# Patient Record
Sex: MALE | Race: WHITE | NOT HISPANIC OR LATINO | URBAN - METROPOLITAN AREA
[De-identification: names, ages, dates, MRNs, and addresses within clinical notes are randomized per-mention and may not be internally consistent; named-entity substitution may affect disease eponyms.]

---

## 2018-03-05 VITALS
RESPIRATION RATE: 20 BRPM | RESPIRATION RATE: 19 BRPM | OXYGEN SATURATION: 95 % | SYSTOLIC BLOOD PRESSURE: 189 MMHG | TEMPERATURE: 97.8 F | HEART RATE: 91 BPM | SYSTOLIC BLOOD PRESSURE: 129 MMHG | RESPIRATION RATE: 17 BRPM | HEART RATE: 80 BPM | HEART RATE: 88 BPM | RESPIRATION RATE: 18 BRPM | OXYGEN SATURATION: 94 % | DIASTOLIC BLOOD PRESSURE: 38 MMHG | SYSTOLIC BLOOD PRESSURE: 137 MMHG | DIASTOLIC BLOOD PRESSURE: 88 MMHG | DIASTOLIC BLOOD PRESSURE: 70 MMHG | SYSTOLIC BLOOD PRESSURE: 139 MMHG | DIASTOLIC BLOOD PRESSURE: 73 MMHG | HEART RATE: 74 BPM | HEIGHT: 68 IN | OXYGEN SATURATION: 99 % | DIASTOLIC BLOOD PRESSURE: 110 MMHG | RESPIRATION RATE: 16 BRPM | HEART RATE: 92 BPM | OXYGEN SATURATION: 93 % | DIASTOLIC BLOOD PRESSURE: 67 MMHG | HEART RATE: 75 BPM | RESPIRATION RATE: 22 BRPM | SYSTOLIC BLOOD PRESSURE: 153 MMHG | HEART RATE: 84 BPM | WEIGHT: 240 LBS | RESPIRATION RATE: 30 BRPM | SYSTOLIC BLOOD PRESSURE: 108 MMHG | DIASTOLIC BLOOD PRESSURE: 97 MMHG | HEART RATE: 72 BPM | SYSTOLIC BLOOD PRESSURE: 76 MMHG | DIASTOLIC BLOOD PRESSURE: 98 MMHG | OXYGEN SATURATION: 90 % | HEART RATE: 100 BPM | OXYGEN SATURATION: 88 % | SYSTOLIC BLOOD PRESSURE: 147 MMHG | SYSTOLIC BLOOD PRESSURE: 117 MMHG | TEMPERATURE: 97.5 F | OXYGEN SATURATION: 62 % | DIASTOLIC BLOOD PRESSURE: 58 MMHG

## 2018-03-06 ENCOUNTER — AMBULATORY SURGICAL CENTER (AMBULATORY)
Dept: URBAN - METROPOLITAN AREA SURGERY 17 | Facility: SURGERY | Age: 53
End: 2018-03-06

## 2018-03-06 ENCOUNTER — OFFICE (AMBULATORY)
Dept: URBAN - METROPOLITAN AREA PATHOLOGY 4 | Facility: PATHOLOGY | Age: 53
End: 2018-03-06

## 2018-03-06 DIAGNOSIS — K29.50 UNSPECIFIED CHRONIC GASTRITIS WITHOUT BLEEDING: ICD-10-CM

## 2018-03-06 DIAGNOSIS — K44.9 DIAPHRAGMATIC HERNIA WITHOUT OBSTRUCTION OR GANGRENE: ICD-10-CM

## 2018-03-06 DIAGNOSIS — R13.10 DYSPHAGIA, UNSPECIFIED: ICD-10-CM

## 2018-03-06 DIAGNOSIS — K22.2 ESOPHAGEAL OBSTRUCTION: ICD-10-CM

## 2018-03-06 DIAGNOSIS — K21.0 GASTRO-ESOPHAGEAL REFLUX DISEASE WITH ESOPHAGITIS: ICD-10-CM

## 2018-03-06 DIAGNOSIS — K21.9 GASTRO-ESOPHAGEAL REFLUX DISEASE WITHOUT ESOPHAGITIS: ICD-10-CM

## 2018-03-06 LAB
GI HISTOLOGY: A. UNSPECIFIED: (no result)
GI HISTOLOGY: B. UNSPECIFIED: (no result)
GI HISTOLOGY: PDF REPORT: (no result)

## 2018-03-06 PROCEDURE — 43239 EGD BIOPSY SINGLE/MULTIPLE: CPT | Mod: 59 | Performed by: INTERNAL MEDICINE

## 2018-03-06 PROCEDURE — 43249 ESOPH EGD DILATION <30 MM: CPT | Performed by: INTERNAL MEDICINE

## 2018-03-06 PROCEDURE — 88305 TISSUE EXAM BY PATHOLOGIST: CPT | Performed by: INTERNAL MEDICINE

## 2018-03-06 RX ORDER — OMEPRAZOLE 40 MG/1
CAPSULE, DELAYED RELEASE ORAL
Qty: 60 | Refills: 6 | Status: ACTIVE

## 2018-03-06 RX ADMIN — PROPOFOL 100 MG: 10 INJECTION, EMULSION INTRAVENOUS at 10:51

## 2018-03-06 RX ADMIN — LIDOCAINE HYDROCHLORIDE 50 MG: 10 INJECTION, SOLUTION EPIDURAL; INFILTRATION; INTRACAUDAL; PERINEURAL at 10:51

## 2018-03-06 RX ADMIN — PROPOFOL 50 MG: 10 INJECTION, EMULSION INTRAVENOUS at 10:57

## 2018-03-06 RX ADMIN — PROPOFOL 50 MG: 10 INJECTION, EMULSION INTRAVENOUS at 10:53

## 2018-03-06 RX ADMIN — PROPOFOL 50 MG: 10 INJECTION, EMULSION INTRAVENOUS at 10:54

## 2018-03-06 RX ADMIN — PROPOFOL 50 MG: 10 INJECTION, EMULSION INTRAVENOUS at 10:55

## 2018-03-06 RX ADMIN — PROPOFOL 50 MG: 10 INJECTION, EMULSION INTRAVENOUS at 11:01

## 2018-03-06 RX ADMIN — PROPOFOL 25 MG: 10 INJECTION, EMULSION INTRAVENOUS at 10:59

## 2018-03-06 NOTE — SERVICEHPINOTES
51 yo WM with GERD. He has had intermittent dysphagia sx. for the past year. Also regurgitation at night. No bleeding.

## 2018-03-26 VITALS
HEART RATE: 87 BPM | OXYGEN SATURATION: 100 % | TEMPERATURE: 98 F | WEIGHT: 240 LBS | SYSTOLIC BLOOD PRESSURE: 135 MMHG | RESPIRATION RATE: 20 BRPM | DIASTOLIC BLOOD PRESSURE: 82 MMHG | TEMPERATURE: 97.8 F | HEART RATE: 77 BPM | HEART RATE: 92 BPM | DIASTOLIC BLOOD PRESSURE: 53 MMHG | SYSTOLIC BLOOD PRESSURE: 119 MMHG | DIASTOLIC BLOOD PRESSURE: 91 MMHG | HEART RATE: 75 BPM | SYSTOLIC BLOOD PRESSURE: 170 MMHG | RESPIRATION RATE: 16 BRPM | DIASTOLIC BLOOD PRESSURE: 96 MMHG | HEART RATE: 82 BPM | OXYGEN SATURATION: 97 % | OXYGEN SATURATION: 98 % | OXYGEN SATURATION: 88 % | DIASTOLIC BLOOD PRESSURE: 76 MMHG | SYSTOLIC BLOOD PRESSURE: 138 MMHG | DIASTOLIC BLOOD PRESSURE: 71 MMHG | OXYGEN SATURATION: 93 % | HEIGHT: 68 IN | OXYGEN SATURATION: 96 % | SYSTOLIC BLOOD PRESSURE: 164 MMHG | RESPIRATION RATE: 18 BRPM | HEART RATE: 98 BPM | RESPIRATION RATE: 23 BRPM | SYSTOLIC BLOOD PRESSURE: 125 MMHG | DIASTOLIC BLOOD PRESSURE: 88 MMHG | HEART RATE: 73 BPM

## 2018-03-27 ENCOUNTER — AMBULATORY SURGICAL CENTER (AMBULATORY)
Dept: URBAN - METROPOLITAN AREA SURGERY 17 | Facility: SURGERY | Age: 53
End: 2018-03-27

## 2018-03-27 ENCOUNTER — OFFICE (AMBULATORY)
Dept: URBAN - METROPOLITAN AREA PATHOLOGY 4 | Facility: PATHOLOGY | Age: 53
End: 2018-03-27

## 2018-03-27 DIAGNOSIS — K44.9 DIAPHRAGMATIC HERNIA WITHOUT OBSTRUCTION OR GANGRENE: ICD-10-CM

## 2018-03-27 DIAGNOSIS — R13.10 DYSPHAGIA, UNSPECIFIED: ICD-10-CM

## 2018-03-27 DIAGNOSIS — K21.0 GASTRO-ESOPHAGEAL REFLUX DISEASE WITH ESOPHAGITIS: ICD-10-CM

## 2018-03-27 DIAGNOSIS — K22.2 ESOPHAGEAL OBSTRUCTION: ICD-10-CM

## 2018-03-27 LAB
GI HISTOLOGY: A. SELECT: (no result)
GI HISTOLOGY: PDF REPORT: (no result)

## 2018-03-27 PROCEDURE — 43239 EGD BIOPSY SINGLE/MULTIPLE: CPT | Mod: 59 | Performed by: INTERNAL MEDICINE

## 2018-03-27 PROCEDURE — 43249 ESOPH EGD DILATION <30 MM: CPT | Performed by: INTERNAL MEDICINE

## 2018-03-27 PROCEDURE — 88305 TISSUE EXAM BY PATHOLOGIST: CPT | Performed by: INTERNAL MEDICINE

## 2018-03-27 RX ADMIN — PROPOFOL 50 MG: 10 INJECTION, EMULSION INTRAVENOUS at 12:08

## 2018-03-27 RX ADMIN — PROPOFOL 200 MG: 10 INJECTION, EMULSION INTRAVENOUS at 12:08

## 2018-03-27 RX ADMIN — PROPOFOL 100 MG: 10 INJECTION, EMULSION INTRAVENOUS at 12:08

## 2018-03-27 RX ADMIN — LIDOCAINE HYDROCHLORIDE 25 MG: 10 INJECTION, SOLUTION EPIDURAL; INFILTRATION; INTRACAUDAL; PERINEURAL at 12:08

## 2018-03-27 RX ADMIN — PROPOFOL 50 MG: 10 INJECTION, EMULSION INTRAVENOUS at 12:12

## 2018-03-27 NOTE — SERVICEHPINOTES
53 yo WM with GERD. He has had intermittent dysphagia sx. for the past year. Also regurgitation at night. No bleeding. He presents for a repeat dilation.

## 2018-09-28 ENCOUNTER — OFFICE (AMBULATORY)
Dept: URBAN - METROPOLITAN AREA CLINIC 75 | Facility: CLINIC | Age: 53
End: 2018-09-28

## 2018-09-28 VITALS
DIASTOLIC BLOOD PRESSURE: 86 MMHG | WEIGHT: 238 LBS | SYSTOLIC BLOOD PRESSURE: 132 MMHG | HEART RATE: 88 BPM | RESPIRATION RATE: 16 BRPM | HEIGHT: 68 IN

## 2018-09-28 DIAGNOSIS — K62.1 RECTAL POLYP: ICD-10-CM

## 2018-09-28 DIAGNOSIS — K21.9 GASTRO-ESOPHAGEAL REFLUX DISEASE WITHOUT ESOPHAGITIS: ICD-10-CM

## 2018-09-28 DIAGNOSIS — Z80.0 FAMILY HISTORY OF MALIGNANT NEOPLASM OF DIGESTIVE ORGANS: ICD-10-CM

## 2018-09-28 DIAGNOSIS — K22.2 ESOPHAGEAL OBSTRUCTION: ICD-10-CM

## 2018-09-28 DIAGNOSIS — D12.2 BENIGN NEOPLASM OF ASCENDING COLON: ICD-10-CM

## 2018-09-28 DIAGNOSIS — R13.10 DYSPHAGIA, UNSPECIFIED: ICD-10-CM

## 2018-09-28 DIAGNOSIS — Z86.010 PERSONAL HISTORY OF COLONIC POLYPS: ICD-10-CM

## 2018-09-28 DIAGNOSIS — K44.9 DIAPHRAGMATIC HERNIA WITHOUT OBSTRUCTION OR GANGRENE: ICD-10-CM

## 2018-09-28 DIAGNOSIS — K21.0 GASTRO-ESOPHAGEAL REFLUX DISEASE WITH ESOPHAGITIS: ICD-10-CM

## 2018-09-28 DIAGNOSIS — Z12.11 ENCOUNTER FOR SCREENING FOR MALIGNANT NEOPLASM OF COLON: ICD-10-CM

## 2018-09-28 DIAGNOSIS — D12.4 BENIGN NEOPLASM OF DESCENDING COLON: ICD-10-CM

## 2018-09-28 PROCEDURE — 99213 OFFICE O/P EST LOW 20 MIN: CPT | Performed by: INTERNAL MEDICINE

## 2019-04-23 ENCOUNTER — TELEPHONE (OUTPATIENT)
Dept: INTERNAL MEDICINE | Facility: CLINIC | Age: 54
End: 2019-04-23

## 2019-05-06 VITALS
SYSTOLIC BLOOD PRESSURE: 97 MMHG | WEIGHT: 242 LBS | DIASTOLIC BLOOD PRESSURE: 60 MMHG | HEART RATE: 70 BPM | HEART RATE: 69 BPM | SYSTOLIC BLOOD PRESSURE: 131 MMHG | SYSTOLIC BLOOD PRESSURE: 95 MMHG | HEART RATE: 79 BPM | SYSTOLIC BLOOD PRESSURE: 96 MMHG | OXYGEN SATURATION: 95 % | HEART RATE: 65 BPM | RESPIRATION RATE: 16 BRPM | RESPIRATION RATE: 13 BRPM | DIASTOLIC BLOOD PRESSURE: 67 MMHG | OXYGEN SATURATION: 100 % | SYSTOLIC BLOOD PRESSURE: 141 MMHG | RESPIRATION RATE: 27 BRPM | OXYGEN SATURATION: 98 % | SYSTOLIC BLOOD PRESSURE: 93 MMHG | DIASTOLIC BLOOD PRESSURE: 95 MMHG | DIASTOLIC BLOOD PRESSURE: 99 MMHG | SYSTOLIC BLOOD PRESSURE: 92 MMHG | HEART RATE: 74 BPM | OXYGEN SATURATION: 96 % | RESPIRATION RATE: 20 BRPM | OXYGEN SATURATION: 97 % | OXYGEN SATURATION: 94 % | SYSTOLIC BLOOD PRESSURE: 103 MMHG | SYSTOLIC BLOOD PRESSURE: 116 MMHG | RESPIRATION RATE: 19 BRPM | SYSTOLIC BLOOD PRESSURE: 114 MMHG | HEART RATE: 67 BPM | DIASTOLIC BLOOD PRESSURE: 59 MMHG | RESPIRATION RATE: 14 BRPM | DIASTOLIC BLOOD PRESSURE: 100 MMHG | SYSTOLIC BLOOD PRESSURE: 112 MMHG | SYSTOLIC BLOOD PRESSURE: 137 MMHG | TEMPERATURE: 97.8 F | SYSTOLIC BLOOD PRESSURE: 147 MMHG | RESPIRATION RATE: 22 BRPM | DIASTOLIC BLOOD PRESSURE: 90 MMHG | DIASTOLIC BLOOD PRESSURE: 53 MMHG | DIASTOLIC BLOOD PRESSURE: 75 MMHG | DIASTOLIC BLOOD PRESSURE: 63 MMHG | HEART RATE: 68 BPM | RESPIRATION RATE: 11 BRPM | DIASTOLIC BLOOD PRESSURE: 50 MMHG | HEART RATE: 76 BPM | HEIGHT: 68 IN | RESPIRATION RATE: 15 BRPM | TEMPERATURE: 98.7 F

## 2019-05-08 ENCOUNTER — AMBULATORY SURGICAL CENTER (AMBULATORY)
Dept: URBAN - METROPOLITAN AREA SURGERY 17 | Facility: SURGERY | Age: 54
End: 2019-05-08
Payer: COMMERCIAL

## 2019-05-08 ENCOUNTER — OFFICE (AMBULATORY)
Dept: URBAN - METROPOLITAN AREA PATHOLOGY 4 | Facility: PATHOLOGY | Age: 54
End: 2019-05-08

## 2019-05-08 DIAGNOSIS — Z86.010 PERSONAL HISTORY OF COLONIC POLYPS: ICD-10-CM

## 2019-05-08 DIAGNOSIS — K63.5 POLYP OF COLON: ICD-10-CM

## 2019-05-08 DIAGNOSIS — D12.0 BENIGN NEOPLASM OF CECUM: ICD-10-CM

## 2019-05-08 DIAGNOSIS — D12.5 BENIGN NEOPLASM OF SIGMOID COLON: ICD-10-CM

## 2019-05-08 DIAGNOSIS — D12.2 BENIGN NEOPLASM OF ASCENDING COLON: ICD-10-CM

## 2019-05-08 DIAGNOSIS — D12.3 BENIGN NEOPLASM OF TRANSVERSE COLON: ICD-10-CM

## 2019-05-08 DIAGNOSIS — Z80.0 FAMILY HISTORY OF MALIGNANT NEOPLASM OF DIGESTIVE ORGANS: ICD-10-CM

## 2019-05-08 DIAGNOSIS — K64.8 OTHER HEMORRHOIDS: ICD-10-CM

## 2019-05-08 LAB
GI HISTOLOGY: A. UNSPECIFIED: (no result)
GI HISTOLOGY: B. UNSPECIFIED: (no result)
GI HISTOLOGY: C. UNSPECIFIED: (no result)
GI HISTOLOGY: D. UNSPECIFIED: (no result)
GI HISTOLOGY: E. UNSPECIFIED: (no result)
GI HISTOLOGY: F. UNSPECIFIED: (no result)
GI HISTOLOGY: PDF REPORT: (no result)

## 2019-05-08 PROCEDURE — 45380 COLONOSCOPY AND BIOPSY: CPT | Mod: 33,59 | Performed by: INTERNAL MEDICINE

## 2019-05-08 PROCEDURE — 88305 TISSUE EXAM BY PATHOLOGIST: CPT | Performed by: INTERNAL MEDICINE

## 2019-05-08 PROCEDURE — 45385 COLONOSCOPY W/LESION REMOVAL: CPT | Mod: 33 | Performed by: INTERNAL MEDICINE

## 2019-05-08 NOTE — SERVICEHPINOTES
53 year-old gentleman who presents for colonoscopy. He has a family history of colon cancer. He also has a personal history of polyps. He is due for a follow up colonoscopy. He has no diarrhea, constipation or bleeding. There is no change in his past medical or past surgical history. He is in no distress, he does not look acutely ill.

## 2019-06-17 ENCOUNTER — LAB (OUTPATIENT)
Dept: INTERNAL MEDICINE | Facility: CLINIC | Age: 54
End: 2019-06-17

## 2019-06-17 DIAGNOSIS — Z00.00 HEALTH CARE MAINTENANCE: ICD-10-CM

## 2019-06-17 DIAGNOSIS — I10 ESSENTIAL HYPERTENSION, BENIGN: Primary | ICD-10-CM

## 2019-06-18 LAB
ALBUMIN SERPL-MCNC: 4.5 G/DL (ref 3.5–5.2)
ALBUMIN/CREAT UR: <2.1 MG/G CREAT (ref 0–30)
ALBUMIN/GLOB SERPL: 1.9 G/DL
ALP SERPL-CCNC: 140 U/L (ref 39–117)
ALT SERPL-CCNC: 28 U/L (ref 1–41)
AST SERPL-CCNC: 24 U/L (ref 1–40)
BILIRUB SERPL-MCNC: 0.5 MG/DL (ref 0.2–1.2)
BUN SERPL-MCNC: 19 MG/DL (ref 6–20)
BUN/CREAT SERPL: 17.1 (ref 7–25)
CALCIUM SERPL-MCNC: 9.8 MG/DL (ref 8.6–10.5)
CHLORIDE SERPL-SCNC: 102 MMOL/L (ref 98–107)
CHOLEST SERPL-MCNC: 183 MG/DL (ref 0–200)
CO2 SERPL-SCNC: 27 MMOL/L (ref 22–29)
CREAT SERPL-MCNC: 1.11 MG/DL (ref 0.76–1.27)
CREAT UR-MCNC: 139.8 MG/DL
GLOBULIN SER CALC-MCNC: 2.4 GM/DL
GLUCOSE SERPL-MCNC: 125 MG/DL (ref 65–99)
HBA1C MFR BLD: 6.3 % (ref 4.8–5.6)
HDLC SERPL-MCNC: 37 MG/DL (ref 40–60)
LDLC SERPL CALC-MCNC: 117 MG/DL (ref 0–100)
MICROALBUMIN UR-MCNC: <3 UG/ML
POTASSIUM SERPL-SCNC: 4.7 MMOL/L (ref 3.5–5.2)
PROT SERPL-MCNC: 6.9 G/DL (ref 6–8.5)
PSA SERPL-MCNC: 0.85 NG/ML (ref 0–4)
SODIUM SERPL-SCNC: 142 MMOL/L (ref 136–145)
TRIGL SERPL-MCNC: 146 MG/DL (ref 0–150)
VLDLC SERPL CALC-MCNC: 29.2 MG/DL

## 2019-06-24 ENCOUNTER — OFFICE VISIT (OUTPATIENT)
Dept: INTERNAL MEDICINE | Facility: CLINIC | Age: 54
End: 2019-06-24

## 2019-06-24 VITALS
HEART RATE: 85 BPM | WEIGHT: 236.8 LBS | SYSTOLIC BLOOD PRESSURE: 118 MMHG | DIASTOLIC BLOOD PRESSURE: 82 MMHG | OXYGEN SATURATION: 98 % | TEMPERATURE: 98.3 F | RESPIRATION RATE: 20 BRPM | BODY MASS INDEX: 35.89 KG/M2 | HEIGHT: 68 IN

## 2019-06-24 DIAGNOSIS — E78.5 HYPERLIPIDEMIA, UNSPECIFIED HYPERLIPIDEMIA TYPE: ICD-10-CM

## 2019-06-24 DIAGNOSIS — I10 ESSENTIAL HYPERTENSION: ICD-10-CM

## 2019-06-24 DIAGNOSIS — Z11.59 NEED FOR HEPATITIS C SCREENING TEST: ICD-10-CM

## 2019-06-24 DIAGNOSIS — E11.9 TYPE 2 DIABETES MELLITUS WITHOUT COMPLICATION, WITHOUT LONG-TERM CURRENT USE OF INSULIN (HCC): ICD-10-CM

## 2019-06-24 DIAGNOSIS — Z00.00 WELLNESS EXAMINATION: Primary | ICD-10-CM

## 2019-06-24 DIAGNOSIS — E03.8 OTHER SPECIFIED HYPOTHYROIDISM: ICD-10-CM

## 2019-06-24 PROCEDURE — 99396 PREV VISIT EST AGE 40-64: CPT | Performed by: INTERNAL MEDICINE

## 2019-06-24 RX ORDER — ATENOLOL 50 MG/1
50 TABLET ORAL DAILY
Refills: 3 | COMMUNITY
Start: 2019-06-06 | End: 2020-03-05

## 2019-06-24 RX ORDER — ATORVASTATIN CALCIUM 20 MG/1
20 TABLET, FILM COATED ORAL DAILY
Qty: 30 TABLET | Refills: 3 | Status: SHIPPED | OUTPATIENT
Start: 2019-06-24 | End: 2019-10-16 | Stop reason: SDUPTHER

## 2019-06-24 RX ORDER — CETIRIZINE HYDROCHLORIDE 10 MG/1
10 TABLET ORAL DAILY
COMMUNITY

## 2019-06-24 RX ORDER — OMEPRAZOLE 40 MG/1
40 CAPSULE, DELAYED RELEASE ORAL 2 TIMES DAILY
Refills: 3 | COMMUNITY
Start: 2019-05-23 | End: 2020-02-19

## 2019-06-24 RX ORDER — FLUTICASONE PROPIONATE 50 MCG
SPRAY, SUSPENSION (ML) NASAL
Refills: 11 | COMMUNITY
Start: 2019-05-23

## 2019-06-24 RX ORDER — LEVOTHYROXINE SODIUM 137 MCG
TABLET ORAL
Refills: 1 | COMMUNITY
Start: 2019-06-06 | End: 2020-03-05

## 2019-06-24 NOTE — PROGRESS NOTES
Subjective        Chief Complaint   Patient presents with   • Follow-up     fup labs    • Hypertension   • Hyperlipidemia   • Hypothyroidism       PHQ-2 Depression Screening  Little interest or pleasure in doing things? 0   Feeling down, depressed, or hopeless? 0   PHQ-2 Total Score 0         Beni Kunz is a 54 y.o. male who presents for    Patient Active Problem List   Diagnosis   • Left knee sprain   • Wellness examination   • Hyperlipidemia   • Hypertension   • Other specified hypothyroidism   • Type 2 diabetes mellitus, without long-term current use of insulin (CMS/[facility])       History of Present Illness     He sees Dr. Long once per year and has had no recent hives. He sees Dr. Platt once per year. He will be seeing him in July with a PSA. He denies chest pain, dyspnea, melena, or hematochezia. He has been checking his BP and it was 180/80 last week. He has not used his elliptical recently. He is not having reflux. He does not use a CPAP at night. His sugars have been 120-156.  No Known Allergies    Current Outpatient Medications on File Prior to Visit   Medication Sig Dispense Refill   • atenolol (TENORMIN) 50 MG tablet Take 50 mg by mouth Daily.  3   • cetirizine (zyrTEC) 10 MG tablet Take 10 mg by mouth Daily.     • fluticasone (FLONASE) 50 MCG/ACT nasal spray SPRAY 1-2 SPRAYS INTO EACH NOSTRIL EVERY DAY  11   • omeprazole (priLOSEC) 40 MG capsule Take 40 mg by mouth 2 (Two) Times a Day.  3   • SYNTHROID 137 MCG tablet TAKE 1 TABLET BY MOUTH DAILY ON AN EMPTY STOMACH FOR THYROID (MD ORNELAS)  1     No current facility-administered medications on file prior to visit.        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[facility])    • ROYCE (obstructive sleep apnea)    • Tubular adenoma of colon 01/2016   • Urticaria        Past Surgical History:    Procedure Laterality Date   • COLONOSCOPY  05/08/2019   • ESOPHAGEAL DILATATION  2018       Family History   Problem Relation Age of Onset   • Cancer Mother         sinus   • Heart disease Mother    • Arthritis Father    • Heart disease Father    • Colon polyps Father    • Stroke Father    • Colon cancer Brother    • Diabetes Maternal Aunt        Social History     Socioeconomic History   • Marital status: Single     Spouse name: Not on file   • Number of children: Not on file   • Years of education: Not on file   • Highest education level: Not on file   Tobacco Use   • Smoking status: Never Smoker   • Smokeless tobacco: Never Used   Substance and Sexual Activity   • Alcohol use: No     Frequency: Never   • Drug use: No   • Sexual activity: Yes     Birth control/protection: Condom           The following portions of the patient's history were reviewed and updated as appropriate: problem list, allergies, current medications, past medical history, past family history, past social history and past surgical history.    Review of Systems   Constitutional: Negative for chills, fever and unexpected weight loss.   HENT: Negative for postnasal drip and sore throat.    Eyes: Negative for blurred vision.   Respiratory: Negative for cough, shortness of breath and wheezing.    Cardiovascular: Negative for chest pain and leg swelling.   Gastrointestinal: Negative for abdominal pain, blood in stool, nausea, vomiting and GERD.   Endocrine: Negative for polyuria.   Genitourinary: Negative for dysuria, frequency and hematuria.   Musculoskeletal: Negative for gait problem.   Skin: Negative for rash.   Allergic/Immunologic: Negative for immunocompromised state.   Neurological: Negative for weakness.   Hematological: Does not bruise/bleed easily.   Psychiatric/Behavioral: Negative for depressed mood. The patient is not nervous/anxious.        Immunization History   Administered Date(s) Administered   • Flu Vaccine Quad PF >18YRS  "12/05/2017, 11/01/2018   • Hepatitis A 01/23/2018, 08/15/2018   • Hepatitis B 01/23/2018, 08/15/2018, 02/15/2019   • Pneumococcal Polysaccharide (PPSV23) 02/15/2019   • Tdap 01/01/2010       Objective   Vitals:    06/24/19 1506   BP: 118/82   Pulse: 85   Resp: 20   Temp: 98.3 °F (36.8 °C)   TempSrc: Oral   SpO2: 98%   Weight: 107 kg (236 lb 12.8 oz)   Height: 172.7 cm (68\")     Physical Exam   Constitutional: He appears well-developed and well-nourished.   HENT:   Head: Normocephalic and atraumatic.   Mouth/Throat: Oropharynx is clear and moist.   Eyes: Conjunctivae and EOM are normal. Pupils are equal, round, and reactive to light.   Neck: Trachea normal. Neck supple. Carotid bruit is not present. No thyromegaly present.   Cardiovascular: Normal rate, regular rhythm and normal heart sounds.   No murmur heard.  Pulmonary/Chest: Effort normal and breath sounds normal.   Abdominal: Soft. He exhibits no distension and no mass. There is no tenderness. There is no rebound.    Beni had a diabetic foot exam performed today.   During the foot exam he had a monofilament test performed.  Vascular Status -  His right foot exhibits no edema. His left foot exhibits no edema.  Skin Integrity  -  His right foot skin is intact.His left foot skin is intact..  Lymphadenopathy:     He has no cervical adenopathy.   Neurological: He is alert.   Skin: Skin is warm.   Psychiatric: He has a normal mood and affect.   Vitals reviewed.      Procedures    Assessment/Plan   Beni was seen today for follow-up, hypertension, hyperlipidemia and hypothyroidism.    Diagnoses and all orders for this visit:    Wellness examination    Hyperlipidemia, unspecified hyperlipidemia type  -     atorvastatin (LIPITOR) 20 MG tablet; Take 1 tablet by mouth Daily.  -     Comprehensive Metabolic Panel; Future  -     Lipid Panel With / Chol / HDL Ratio; Future    Essential hypertension    Other specified hypothyroidism  -     TSH; Future    Type 2 diabetes " mellitus without complication, without long-term current use of insulin (CMS/HCC)    Need for hepatitis C screening test  -     HCV Antibody Rfx To Qnt PCR; Future      Reviewed labs. Start Lipitor for increased LDL. Encouraged exercise 150 minutes per week. Sees Dr. Platt in July. Had cscope last month. BP is good. A1c and BP are at goal.           No Follow-up on file.

## 2019-09-22 ENCOUNTER — RESULTS ENCOUNTER (OUTPATIENT)
Dept: INTERNAL MEDICINE | Facility: CLINIC | Age: 54
End: 2019-09-22

## 2019-09-22 DIAGNOSIS — E03.8 OTHER SPECIFIED HYPOTHYROIDISM: ICD-10-CM

## 2019-09-22 DIAGNOSIS — E78.5 HYPERLIPIDEMIA, UNSPECIFIED HYPERLIPIDEMIA TYPE: ICD-10-CM

## 2019-09-22 DIAGNOSIS — Z11.59 NEED FOR HEPATITIS C SCREENING TEST: ICD-10-CM

## 2019-09-26 ENCOUNTER — OFFICE VISIT (OUTPATIENT)
Dept: INTERNAL MEDICINE | Facility: CLINIC | Age: 54
End: 2019-09-26

## 2019-09-26 VITALS
BODY MASS INDEX: 36.28 KG/M2 | DIASTOLIC BLOOD PRESSURE: 80 MMHG | SYSTOLIC BLOOD PRESSURE: 120 MMHG | HEIGHT: 68 IN | WEIGHT: 239.4 LBS

## 2019-09-26 DIAGNOSIS — E78.5 HYPERLIPIDEMIA, UNSPECIFIED HYPERLIPIDEMIA TYPE: ICD-10-CM

## 2019-09-26 DIAGNOSIS — E03.8 OTHER SPECIFIED HYPOTHYROIDISM: ICD-10-CM

## 2019-09-26 DIAGNOSIS — R76.8 HEPATITIS C ANTIBODY POSITIVE IN BLOOD: ICD-10-CM

## 2019-09-26 DIAGNOSIS — E11.9 TYPE 2 DIABETES MELLITUS WITHOUT COMPLICATION, WITHOUT LONG-TERM CURRENT USE OF INSULIN (HCC): ICD-10-CM

## 2019-09-26 DIAGNOSIS — R74.8 ALKALINE PHOSPHATASE ELEVATION: ICD-10-CM

## 2019-09-26 DIAGNOSIS — I10 ESSENTIAL HYPERTENSION: Primary | ICD-10-CM

## 2019-09-26 PROCEDURE — 90471 IMMUNIZATION ADMIN: CPT | Performed by: INTERNAL MEDICINE

## 2019-09-26 PROCEDURE — 99214 OFFICE O/P EST MOD 30 MIN: CPT | Performed by: INTERNAL MEDICINE

## 2019-09-26 PROCEDURE — 90674 CCIIV4 VAC NO PRSV 0.5 ML IM: CPT | Performed by: INTERNAL MEDICINE

## 2019-09-26 NOTE — PROGRESS NOTES
Subjective        Chief Complaint   Patient presents with   • Follow-up     fup labs    • Hypertension   • Hypothyroidism   • Hyperlipidemia           Beni Kunz is a 54 y.o. male who presents for    Patient Active Problem List   Diagnosis   • Hyperlipidemia   • Hypertension   • Other specified hypothyroidism   • Type 2 diabetes mellitus, without long-term current use of insulin (CMS/HCC)   • Hepatitis C antibody positive in blood   • Alkaline phosphatase elevation       History of Present Illness     He has had no side effects with Lipitor. He has been checking his BP and it was 130/87 with Dr. Platt; he had a normal prostate exam and PSA. He denies bone pain, chest pain, nausea or abdominal pain. He checks his sugars and it was 130 this am. It has been averaging 125 in the am. He denies IVDA, tattoos, or blood transfusions. He has been with the same women for 20 years; he says he is not aware of her having hep C.  No Known Allergies    Current Outpatient Medications on File Prior to Visit   Medication Sig Dispense Refill   • atenolol (TENORMIN) 50 MG tablet Take 50 mg by mouth Daily.  3   • atorvastatin (LIPITOR) 20 MG tablet Take 1 tablet by mouth Daily. 30 tablet 3   • cetirizine (zyrTEC) 10 MG tablet Take 10 mg by mouth Daily.     • fluticasone (FLONASE) 50 MCG/ACT nasal spray SPRAY 1-2 SPRAYS INTO EACH NOSTRIL EVERY DAY  11   • omeprazole (priLOSEC) 40 MG capsule Take 40 mg by mouth 2 (Two) Times a Day.  3   • SYNTHROID 137 MCG tablet TAKE 1 TABLET BY MOUTH DAILY ON AN EMPTY STOMACH FOR THYROID (MD SOLISS)  1     No current facility-administered medications on file prior to visit.        Past Medical History:   Diagnosis Date   • Abnormal liver enzymes    • Allergic rhinitis    • Esophagitis    • Fatty liver    • GERD (gastroesophageal reflux disease)    • History of allergy    • Hyperlipidemia    • Hypertension    • IgA deficiency (CMS/HCC)    • ROYCE (obstructive sleep apnea)    • Tubular adenoma of colon  "01/2016   • Urticaria        Past Surgical History:   Procedure Laterality Date   • COLONOSCOPY  05/08/2019   • ESOPHAGEAL DILATATION  2018       Family History   Problem Relation Age of Onset   • Cancer Mother         sinus   • Heart disease Mother    • Arthritis Father    • Heart disease Father    • Colon polyps Father    • Stroke Father    • Colon cancer Brother    • Diabetes Maternal Aunt        Social History     Socioeconomic History   • Marital status: Single     Spouse name: Not on file   • Number of children: Not on file   • Years of education: Not on file   • Highest education level: Not on file   Tobacco Use   • Smoking status: Never Smoker   • Smokeless tobacco: Never Used   Substance and Sexual Activity   • Alcohol use: No     Frequency: Never   • Drug use: No   • Sexual activity: Yes     Birth control/protection: Condom           The following portions of the patient's history were reviewed and updated as appropriate: problem list, allergies, current medications, past medical history, past family history, past social history and past surgical history.    Review of Systems   Cardiovascular: Negative for chest pain.   Gastrointestinal: Negative for abdominal pain.       Immunization History   Administered Date(s) Administered   • Flu Vaccine Quad PF >18YRS 12/05/2017, 11/01/2018   • Hepatitis A 01/23/2018, 08/15/2018   • Hepatitis B 01/23/2018, 08/15/2018, 02/15/2019   • Pneumococcal Polysaccharide (PPSV23) 02/15/2019   • Tdap 01/01/2010   • flucelvax quad pfs =>4 YRS 09/26/2019       Objective   Vitals:    09/26/19 1439   BP: 120/80   Weight: 109 kg (239 lb 6.4 oz)   Height: 172.7 cm (68\")     Physical Exam   Constitutional: He appears well-developed and well-nourished.   HENT:   Head: Normocephalic and atraumatic.   Cardiovascular: Normal rate, regular rhythm, S1 normal, S2 normal and normal heart sounds.   Pulmonary/Chest: Effort normal and breath sounds normal.   Abdominal: Soft. He exhibits no " distension and no mass.   Neurological: He is alert.   Skin: Skin is warm.   Psychiatric: He has a normal mood and affect.   Vitals reviewed.      Procedures    Assessment/Plan   Beni was seen today for follow-up, hypertension, hypothyroidism and hyperlipidemia.    Diagnoses and all orders for this visit:    Essential hypertension  -     Comprehensive Metabolic Panel; Future  -     TSH; Future    Hyperlipidemia, unspecified hyperlipidemia type    Other specified hypothyroidism    Hepatitis C antibody positive in blood    Alkaline phosphatase elevation    Type 2 diabetes mellitus without complication, without long-term current use of insulin (CMS/[facility])  -     Hemoglobin A1c; Future    Other orders  -     Flucelvax Quad=>4Years ([id number])             Preliminary labs reviewed; we will see if he cleared hep C. I will have him come back next week for CBC, GGT, HIV, hep B and RPR. TSH and LDL are at goal. If he still has a hep C viral load then I will have him see Dr. Mahendra Michelle who has done his scopes.    Return in about 3 months (around 12/26/2019).

## 2019-10-01 ENCOUNTER — TELEPHONE (OUTPATIENT)
Dept: INTERNAL MEDICINE | Facility: CLINIC | Age: 54
End: 2019-10-01

## 2019-10-01 LAB
ALBUMIN SERPL-MCNC: 4.4 G/DL (ref 3.5–5.2)
ALBUMIN/GLOB SERPL: 1.9 G/DL
ALP SERPL-CCNC: 181 U/L (ref 39–117)
ALT SERPL-CCNC: 28 U/L (ref 1–41)
AST SERPL-CCNC: 25 U/L (ref 1–40)
BILIRUB SERPL-MCNC: 0.4 MG/DL (ref 0.2–1.2)
BUN SERPL-MCNC: 15 MG/DL (ref 6–20)
BUN/CREAT SERPL: 14 (ref 7–25)
CALCIUM SERPL-MCNC: 9.3 MG/DL (ref 8.6–10.5)
CHLORIDE SERPL-SCNC: 103 MMOL/L (ref 98–107)
CHOLEST SERPL-MCNC: 134 MG/DL (ref 0–200)
CHOLEST/HDLC SERPL: 3.94 {RATIO}
CO2 SERPL-SCNC: 25.1 MMOL/L (ref 22–29)
CREAT SERPL-MCNC: 1.07 MG/DL (ref 0.76–1.27)
DIAGNOSTIC IMP SPEC-IMP: NORMAL
GLOBULIN SER CALC-MCNC: 2.3 GM/DL
GLUCOSE SERPL-MCNC: 130 MG/DL (ref 65–99)
HCV AB S/CO SERPL IA: 1.6 S/CO RATIO (ref 0–0.9)
HCV RNA SERPL NAA+PROBE-ACNC: NORMAL IU/ML
HDLC SERPL-MCNC: 34 MG/DL (ref 40–60)
LDLC SERPL CALC-MCNC: 80 MG/DL (ref 0–100)
POTASSIUM SERPL-SCNC: 4.9 MMOL/L (ref 3.5–5.2)
PROT SERPL-MCNC: 6.7 G/DL (ref 6–8.5)
REF LAB TEST REF RANGE: NORMAL
SODIUM SERPL-SCNC: 143 MMOL/L (ref 136–145)
TRIGL SERPL-MCNC: 98 MG/DL (ref 0–150)
TSH SERPL DL<=0.005 MIU/L-ACNC: 3.16 UIU/ML (ref 0.27–4.2)
VLDLC SERPL CALC-MCNC: 19.6 MG/DL

## 2019-10-01 NOTE — TELEPHONE ENCOUNTER
Tell him hep C viral load was negative but I do still want to speak to him. Ask him to give us a number I can call him at on Thursday.

## 2019-10-04 ENCOUNTER — LAB (OUTPATIENT)
Dept: INTERNAL MEDICINE | Facility: CLINIC | Age: 54
End: 2019-10-04

## 2019-10-04 DIAGNOSIS — R74.8 ALKALINE PHOSPHATASE ELEVATION: ICD-10-CM

## 2019-10-04 DIAGNOSIS — R76.8 HEPATITIS C ANTIBODY POSITIVE IN BLOOD: Primary | ICD-10-CM

## 2019-10-04 DIAGNOSIS — E78.5 HYPERLIPIDEMIA, UNSPECIFIED HYPERLIPIDEMIA TYPE: ICD-10-CM

## 2019-10-10 LAB
DIAGNOSTIC IMP SPEC-IMP: NORMAL
HBV CORE AB SERPL QL IA: NEGATIVE
HBV SURFACE AB SER QL: NON REACTIVE
HBV SURFACE AG SERPL QL IA: (no result)
HBV SURFACE AG SERPL QL NT: NEGATIVE
HIV 1 & 2 AB SERPLBLD IA.RAPID: NEGATIVE
HIV 1+2 AB+HIV1 P24 AG SERPL QL IA: REACTIVE
HIV 2 AB SERPLBLD QL IA.RAPID: NEGATIVE
HIV1 AB SERPLBLD QL IA.RAPID: NEGATIVE
HIV1 RNA SERPL QL NAA+PROBE: NEGATIVE
RPR SER QL: NORMAL

## 2019-10-11 ENCOUNTER — TELEPHONE (OUTPATIENT)
Dept: INTERNAL MEDICINE | Facility: CLINIC | Age: 54
End: 2019-10-11

## 2019-10-11 DIAGNOSIS — Z21 HIV ANTIBODY POSITIVE (HCC): Primary | ICD-10-CM

## 2019-10-11 DIAGNOSIS — R76.8 HEPATITIS C ANTIBODY POSITIVE IN BLOOD: ICD-10-CM

## 2019-10-11 NOTE — PROGRESS NOTES
I spoke with Dr. Mao yesterday and Mr. Kunz today. He has been with the same partner for over 20 years and has not been intimate with anyone else. He has no foreign travel specifically to [place]. I will have him see ID for positive initial ab to Hep C and HIV as he denies risk factors for either

## 2019-10-16 DIAGNOSIS — E78.5 HYPERLIPIDEMIA, UNSPECIFIED HYPERLIPIDEMIA TYPE: ICD-10-CM

## 2019-10-16 RX ORDER — ATORVASTATIN CALCIUM 20 MG/1
TABLET, FILM COATED ORAL
Qty: 30 TABLET | Refills: 3 | Status: SHIPPED | OUTPATIENT
Start: 2019-10-16 | End: 2020-02-17

## 2019-11-15 ENCOUNTER — APPOINTMENT (OUTPATIENT)
Dept: LAB | Facility: HOSPITAL | Age: 54
End: 2019-11-15

## 2019-11-15 ENCOUNTER — OFFICE VISIT (OUTPATIENT)
Dept: INFECTIOUS DISEASES | Facility: CLINIC | Age: 54
End: 2019-11-15

## 2019-11-15 VITALS
HEIGHT: 68 IN | BODY MASS INDEX: 35.64 KG/M2 | TEMPERATURE: 98.5 F | DIASTOLIC BLOOD PRESSURE: 77 MMHG | HEART RATE: 65 BPM | SYSTOLIC BLOOD PRESSURE: 114 MMHG | WEIGHT: 235.2 LBS

## 2019-11-15 DIAGNOSIS — Z78.9 FALSE POSITIVE HIV SEROLOGY: ICD-10-CM

## 2019-11-15 DIAGNOSIS — R76.8 FALSE POSITIVE SEROLOGICAL TEST FOR HEPATITIS C: Primary | ICD-10-CM

## 2019-11-15 LAB
BASOPHILS # BLD AUTO: 0.03 10*3/MM3 (ref 0–0.2)
BASOPHILS NFR BLD AUTO: 0.4 % (ref 0–1.5)
DEPRECATED RDW RBC AUTO: 42 FL (ref 37–54)
EOSINOPHIL # BLD AUTO: 0.45 10*3/MM3 (ref 0–0.4)
EOSINOPHIL NFR BLD AUTO: 6.3 % (ref 0.3–6.2)
ERYTHROCYTE [DISTWIDTH] IN BLOOD BY AUTOMATED COUNT: 13.7 % (ref 12.3–15.4)
HCT VFR BLD AUTO: 46.9 % (ref 37.5–51)
HCV AB SER DONR QL: NORMAL
HGB BLD-MCNC: 15.2 G/DL (ref 13–17.7)
HIV1+2 AB SER QL: NORMAL
IMM GRANULOCYTES # BLD AUTO: 0.02 10*3/MM3 (ref 0–0.05)
IMM GRANULOCYTES NFR BLD AUTO: 0.3 % (ref 0–0.5)
LYMPHOCYTES # BLD AUTO: 1.5 10*3/MM3 (ref 0.7–3.1)
LYMPHOCYTES NFR BLD AUTO: 21.1 % (ref 19.6–45.3)
MCH RBC QN AUTO: 27 PG (ref 26.6–33)
MCHC RBC AUTO-ENTMCNC: 32.4 G/DL (ref 31.5–35.7)
MCV RBC AUTO: 83.3 FL (ref 79–97)
MONOCYTES # BLD AUTO: 0.52 10*3/MM3 (ref 0.1–0.9)
MONOCYTES NFR BLD AUTO: 7.3 % (ref 5–12)
NEUTROPHILS # BLD AUTO: 4.59 10*3/MM3 (ref 1.7–7)
NEUTROPHILS NFR BLD AUTO: 64.6 % (ref 42.7–76)
NRBC BLD AUTO-RTO: 0 /100 WBC (ref 0–0.2)
PLATELET # BLD AUTO: 231 10*3/MM3 (ref 140–450)
PMV BLD AUTO: 10.7 FL (ref 6–12)
RBC # BLD AUTO: 5.63 10*6/MM3 (ref 4.14–5.8)
WBC NRBC COR # BLD: 7.11 10*3/MM3 (ref 3.4–10.8)

## 2019-11-15 PROCEDURE — 86803 HEPATITIS C AB TEST: CPT | Performed by: INTERNAL MEDICINE

## 2019-11-15 PROCEDURE — 87536 HIV-1 QUANT&REVRSE TRNSCRPJ: CPT | Performed by: INTERNAL MEDICINE

## 2019-11-15 PROCEDURE — 85025 COMPLETE CBC W/AUTO DIFF WBC: CPT | Performed by: INTERNAL MEDICINE

## 2019-11-15 PROCEDURE — 99204 OFFICE O/P NEW MOD 45 MIN: CPT | Performed by: INTERNAL MEDICINE

## 2019-11-15 PROCEDURE — G0432 EIA HIV-1/HIV-2 SCREEN: HCPCS | Performed by: INTERNAL MEDICINE

## 2019-11-15 PROCEDURE — 87522 HEPATITIS C REVRS TRNSCRPJ: CPT | Performed by: INTERNAL MEDICINE

## 2019-11-15 PROCEDURE — 36415 COLL VENOUS BLD VENIPUNCTURE: CPT | Performed by: INTERNAL MEDICINE

## 2019-11-15 NOTE — PROGRESS NOTES
Referring Provider: Jet Moreno MD  [id number]  [address] [address]    Reason for Consultation: + HCV Ab and + HIV Ab with negative confirmatory tests    History of present illness:  Beni Kunz is a 54 y.o. who I am asked to evaluate and give opinion for + HCV Ab and + HIV Ab with negative confirmatory tests. History is obtained from the patient, Dr Moreno, and review of the old medical records which I summarize/synthesize as follows: he had a Hep C Ab test done in September 2019 by his PCP due to the recommendations for hepatitis C testing in baby boomers. His Hep C Ab was positive but HCV RNA was negative. Due to the positive Hep C test, his PCP also ordered HIV Ab which was positive (presumably p24 Ag) but negative antibody testing and negative HIV RNA.    He does not think he had ever been tested for either disease in the past.    He has never had any blood transfusions. He has never used intravenous drugs. He's never had an accidental needle stick. He's never come into contact with someone's blood to his knowledge. He has not traveled internationally. He has sex with women and uses condoms most (but not all) of the time. He denies receptive anal intercourse. He's never been diagnosed with another sexually transmitted infection. No recent syndromes that would be consistent w/ acute HIV. He has no night sweats or weight loss. No thrush.    Past Medical History:   Diagnosis Date   • Abnormal liver enzymes    • Allergic rhinitis    • Esophagitis    • Fatty liver    • GERD (gastroesophageal reflux disease)    • History of allergy    • Hyperlipidemia    • Hypertension    • IgA deficiency (CMS/HCC)    • ROYCE (obstructive sleep apnea)    • Tubular adenoma of colon 01/2016   • Urticaria        Past Surgical History:   Procedure Laterality Date   • COLONOSCOPY  05/08/2019   • ESOPHAGEAL DILATATION  2018     Social History:  Single  Social EtOH use  Dog owner    Lives in  "[place]    Family History:  1st degree relatives w/ CAD before age 60 and cancer    Antibiotic allergies and intolerances:  None    Medications:    Current Outpatient Medications:   •  atenolol (TENORMIN) 50 MG tablet, Take 50 mg by mouth Daily., Disp: , Rfl: 3  •  atorvastatin (LIPITOR) 20 MG tablet, TAKE 1 TABLET BY MOUTH EVERY DAY, Disp: 30 tablet, Rfl: 3  •  cetirizine (zyrTEC) 10 MG tablet, Take 10 mg by mouth Daily., Disp: , Rfl:   •  fluticasone (FLONASE) 50 MCG/ACT nasal spray, SPRAY 1-2 SPRAYS INTO EACH NOSTRIL EVERY DAY, Disp: , Rfl: 11  •  omeprazole (priLOSEC) 40 MG capsule, Take 40 mg by mouth 2 (Two) Times a Day., Disp: , Rfl: 3  •  SYNTHROID 137 MCG tablet, TAKE 1 TABLET BY MOUTH DAILY ON AN EMPTY STOMACH FOR THYROID (MD ORNELAS), Disp: , Rfl: 1    Review of Systems  All systems were reviewed and are negative unless otherwise stated above in the HPI    Objective   Vital Signs   /77   Pulse 65   Temp 98.5 °F (36.9 °C)   Ht 172.7 cm (68\")   Wt 107 kg (235 lb 3.2 oz)   BMI 35.76 kg/m²     Physical Exam:   General: awake, alert, NAD, very nice   Head: Normocephalic  Eyes: no scleral icterus  ENT: MMM, OP clear, no thrush. Good dentition.   Cardiovascular: NR, RR, no murmurs, no LE edema  Respiratory: Lungs are clear to auscultation bilaterally, no rales or wheezing; normal work of breathing on ambient air  GI: Abdomen is obese, soft, non-tender, non-distended  : no Vitale catheter present  Skin: No rashes  Neurological: Alert and oriented x 3, motor strength 5/5 in all four extremities  Psychiatric: Normal mood and affect     Labs:     No results found for: WBC, HGB, HCT, MCV, PLT    Lab Results   Component Value Date    BUN 15 09/23/2019    CREATININE 1.07 09/23/2019    EGFRIFNONA 72 09/23/2019    EGFRIFAFRI 87 09/23/2019    BCR 14.0 09/23/2019    CO2 25.1 09/23/2019    CALCIUM 9.3 09/23/2019    PROTENTOTREF 6.7 09/23/2019    ALBUMIN 4.40 09/23/2019    LABIL2 1.9 09/23/2019    AST 25 " 09/23/2019    ALT 28 09/23/2019     Lab Results   Component Value Date    HGBA1C 6.30 (H) 06/17/2019 9/23 HCV Ab positive  9/23 HCV RNA negative  10/4 HIV Ab negative  10/4 HIV 4th generation Ab positive  10/4 HIV RNA negative  10/4 Hep B sAg negative  10/4 Hep B sAb negative  10/4 Hep B cAb negative  10/4 RPR nonreactive    Microbiology:  none    Radiology (personally reviewed images and report):  none    Assessment/Plan   1. + HCV Ab - negative quantitative test  Other than birth year, he lacks common risk factors (drug use and blood transfusions) for hepatitis C . I'm not sure why the Hep C Ab test was positive. It's possible he had an unknown exposure in the past but spontaneously cleared the virus. Also, false positives are known to occur. I'll recheck both the Hep C Ab and HCV RNA today.     2. + HIV Ab - negative quantitative test  He denies the most common HIV risk factors (MSM and IV drug use). He's had unprotected sex with women but no known HIV+ contacts and has never had any other STIs. I will repeat the HIV Ag/Ab test and HIV RNA today. False positive HIV Ab tests are known to occur.      I will call him next week with the test results and make further plans after that. Based on low risk factors and no stigmata of HIV or Hep C, I suspect we are dealing with false positive test results.     Thank you for this consultation.

## 2019-11-17 LAB
HCV GENTYP SERPL NAA+PROBE: NORMAL
HCV RNA SERPL NAA+PROBE-ACNC: 20 IU/ML
HCV RNA SERPL NAA+PROBE-LOG IU: 1.3 LOG10 IU/ML
HIV1 RNA # SERPL NAA+PROBE: <20 COPIES/ML
LOG10 HIV-1 RNA: NORMAL
REF LAB TEST REF RANGE: NORMAL

## 2019-11-18 DIAGNOSIS — R76.8 FALSE POSITIVE SEROLOGICAL TEST FOR HEPATITIS C: Primary | ICD-10-CM

## 2019-12-14 LAB
BASOPHILS # BLD AUTO: 0.04 10*3/MM3 (ref 0–0.2)
BASOPHILS NFR BLD AUTO: 0.7 % (ref 0–1.5)
EOSINOPHIL # BLD AUTO: 0.38 10*3/MM3 (ref 0–0.4)
EOSINOPHIL NFR BLD AUTO: 6.5 % (ref 0.3–6.2)
ERYTHROCYTE [DISTWIDTH] IN BLOOD BY AUTOMATED COUNT: 13.5 % (ref 12.3–15.4)
GGT SERPL-CCNC: 44 U/L (ref 8–61)
HBV SURFACE AG SERPL QL IA: NORMAL
HBV SURFACE AG SERPL QL NT: NEGATIVE
HCT VFR BLD AUTO: 42.8 % (ref 37.5–51)
HGB BLD-MCNC: 14.5 G/DL (ref 13–17.7)
HIV 1+2 AB+HIV1 P24 AG SERPL QL IA: NORMAL
IMM GRANULOCYTES # BLD AUTO: 0.02 10*3/MM3 (ref 0–0.05)
IMM GRANULOCYTES NFR BLD AUTO: 0.3 % (ref 0–0.5)
LYMPHOCYTES # BLD AUTO: 1.41 10*3/MM3 (ref 0.7–3.1)
LYMPHOCYTES NFR BLD AUTO: 24.2 % (ref 19.6–45.3)
MCH RBC QN AUTO: 27.9 PG (ref 26.6–33)
MCHC RBC AUTO-ENTMCNC: 33.9 G/DL (ref 31.5–35.7)
MCV RBC AUTO: 82.5 FL (ref 79–97)
MONOCYTES # BLD AUTO: 0.51 10*3/MM3 (ref 0.1–0.9)
MONOCYTES NFR BLD AUTO: 8.8 % (ref 5–12)
NEUTROPHILS # BLD AUTO: 3.46 10*3/MM3 (ref 1.7–7)
NEUTROPHILS NFR BLD AUTO: 59.5 % (ref 42.7–76)
NRBC BLD AUTO-RTO: 0 /100 WBC (ref 0–0.2)
PLATELET # BLD AUTO: 208 10*3/MM3 (ref 140–450)
RBC # BLD AUTO: 5.19 10*6/MM3 (ref 4.14–5.8)
RPR SER QL: NORMAL
SPECIMEN STATUS: NORMAL
WBC # BLD AUTO: 5.82 10*3/MM3 (ref 3.4–10.8)

## 2019-12-16 ENCOUNTER — OFFICE VISIT (OUTPATIENT)
Dept: INTERNAL MEDICINE | Facility: CLINIC | Age: 54
End: 2019-12-16

## 2019-12-16 VITALS
DIASTOLIC BLOOD PRESSURE: 82 MMHG | HEIGHT: 68 IN | BODY MASS INDEX: 36.22 KG/M2 | SYSTOLIC BLOOD PRESSURE: 106 MMHG | WEIGHT: 239 LBS

## 2019-12-16 DIAGNOSIS — E11.9 TYPE 2 DIABETES MELLITUS WITHOUT COMPLICATION, WITHOUT LONG-TERM CURRENT USE OF INSULIN (HCC): Primary | ICD-10-CM

## 2019-12-16 DIAGNOSIS — E03.8 OTHER SPECIFIED HYPOTHYROIDISM: ICD-10-CM

## 2019-12-16 DIAGNOSIS — I10 ESSENTIAL HYPERTENSION: ICD-10-CM

## 2019-12-16 PROCEDURE — 99213 OFFICE O/P EST LOW 20 MIN: CPT | Performed by: INTERNAL MEDICINE

## 2019-12-16 NOTE — PROGRESS NOTES
Subjective        Chief Complaint   Patient presents with   • Hypertension   • Hypothyroidism           Beni Kunz is a 54 y.o. male who presents for    Patient Active Problem List   Diagnosis   • Hyperlipidemia   • Hypertension   • Other specified hypothyroidism   • Type 2 diabetes mellitus, without long-term current use of insulin (CMS/HCC)   • Hepatitis C antibody positive in blood   • Alkaline phosphatase elevation       History of Present Illness     He met with ID and he was told that he is definitely not HIV positive. He denies chest pain, dyspnea, nausea or abdominal pain. His BP was 127/84 last week. He has been checking his sugars and they run 156 on average.  No Known Allergies    Current Outpatient Medications on File Prior to Visit   Medication Sig Dispense Refill   • atenolol (TENORMIN) 50 MG tablet Take 50 mg by mouth Daily.  3   • atorvastatin (LIPITOR) 20 MG tablet TAKE 1 TABLET BY MOUTH EVERY DAY 30 tablet 3   • cetirizine (zyrTEC) 10 MG tablet Take 10 mg by mouth Daily.     • fluticasone (FLONASE) 50 MCG/ACT nasal spray SPRAY 1-2 SPRAYS INTO EACH NOSTRIL EVERY DAY  11   • omeprazole (priLOSEC) 40 MG capsule Take 40 mg by mouth 2 (Two) Times a Day.  3   • SYNTHROID 137 MCG tablet TAKE 1 TABLET BY MOUTH DAILY ON AN EMPTY STOMACH FOR THYROID (MD DNS)  1     No current facility-administered medications on file prior to visit.        Past Medical History:   Diagnosis Date   • Abnormal liver enzymes    • Allergic rhinitis    • Esophagitis    • Fatty liver    • GERD (gastroesophageal reflux disease)    • History of allergy    • Hyperlipidemia    • Hypertension    • IgA deficiency (CMS/HCC)    • ROYCE (obstructive sleep apnea)    • Tubular adenoma of colon 01/2016   • Urticaria        Past Surgical History:   Procedure Laterality Date   • COLONOSCOPY  05/08/2019   • ESOPHAGEAL DILATATION  2018       Family History   Problem Relation Age of Onset   • Cancer Mother         sinus   • Heart disease Mother   "  • Arthritis Father    • Heart disease Father    • Colon polyps Father    • Stroke Father    • Colon cancer Brother    • Diabetes Maternal Aunt        Social History     Socioeconomic History   • Marital status: Single     Spouse name: Not on file   • Number of children: Not on file   • Years of education: Not on file   • Highest education level: Not on file   Tobacco Use   • Smoking status: Never Smoker   • Smokeless tobacco: Never Used   Substance and Sexual Activity   • Alcohol use: No     Frequency: Never   • Drug use: No   • Sexual activity: Yes     Birth control/protection: Condom           The following portions of the patient's history were reviewed and updated as appropriate: problem list, allergies, current medications, past medical history, past family history, past social history and past surgical history.    Review of Systems   Respiratory: Negative for shortness of breath.    Cardiovascular: Negative for chest pain.       Immunization History   Administered Date(s) Administered   • Flu Vaccine Quad PF >18YRS 12/05/2017, 11/01/2018   • Hepatitis A 01/23/2018, 08/15/2018   • Hepatitis B 01/23/2018, 08/15/2018, 02/15/2019   • Pneumococcal Polysaccharide (PPSV23) 02/15/2019   • Tdap 01/01/2010   • flucelvax quad pfs =>4 YRS 09/26/2019       Objective   Vitals:    12/16/19 1141   BP: 106/82   Weight: 108 kg (239 lb)   Height: 172.7 cm (68\")     Body mass index is 36.34 kg/m².  Physical Exam   Constitutional: He appears well-developed and well-nourished.   HENT:   Head: Normocephalic and atraumatic.   Cardiovascular: Normal rate, regular rhythm, S1 normal, S2 normal and normal heart sounds.   Pulmonary/Chest: Effort normal and breath sounds normal.   Neurological: He is alert.   Skin: Skin is warm.   Psychiatric: He has a normal mood and affect.   Vitals reviewed.      Procedures    Assessment/Plan   Beni was seen today for hypertension and hypothyroidism.    Diagnoses and all orders for this " visit:    Type 2 diabetes mellitus without complication, without long-term current use of insulin (CMS/[facility])  -     Comprehensive Metabolic Panel  -     Hemoglobin A1c  -     Comprehensive Metabolic Panel; Future  -     Hemoglobin A1c; Future  -     Lipid Panel With / Chol / HDL Ratio; Future  -     Microalbumin / Creatinine Urine Ratio - Urine, Clean Catch; Future    Other specified hypothyroidism  -     TSH    Essential hypertension  -     TSH; Future             Add on TSH and a1c to blood in lab. BP is good.    Return in about 6 months (around 6/16/2020) for Annual physical.

## 2019-12-17 LAB
ALBUMIN SERPL-MCNC: 4.2 G/DL (ref 3.5–5.2)
ALBUMIN/GLOB SERPL: 1.8 G/DL
ALP SERPL-CCNC: 160 U/L (ref 39–117)
ALT SERPL-CCNC: 25 U/L (ref 1–41)
AST SERPL-CCNC: 22 U/L (ref 1–40)
BILIRUB SERPL-MCNC: 0.4 MG/DL (ref 0.2–1.2)
BUN SERPL-MCNC: 14 MG/DL (ref 6–20)
BUN/CREAT SERPL: 12.7 (ref 7–25)
CALCIUM SERPL-MCNC: 9.2 MG/DL (ref 8.6–10.5)
CHLORIDE SERPL-SCNC: 103 MMOL/L (ref 98–107)
CO2 SERPL-SCNC: 22.7 MMOL/L (ref 22–29)
CREAT SERPL-MCNC: 1.1 MG/DL (ref 0.76–1.27)
GLOBULIN SER CALC-MCNC: 2.4 GM/DL
GLUCOSE SERPL-MCNC: 128 MG/DL (ref 65–99)
HBA1C MFR BLD: 6.8 % (ref 4.8–5.6)
Lab: NORMAL
POTASSIUM SERPL-SCNC: 4.1 MMOL/L (ref 3.5–5.2)
PROT SERPL-MCNC: 6.6 G/DL (ref 6–8.5)
SODIUM SERPL-SCNC: 143 MMOL/L (ref 136–145)
TSH SERPL DL<=0.005 MIU/L-ACNC: 1.29 UIU/ML (ref 0.27–4.2)
WRITTEN AUTHORIZATION: NORMAL

## 2020-02-01 ENCOUNTER — RESULTS ENCOUNTER (OUTPATIENT)
Dept: INFECTIOUS DISEASES | Facility: CLINIC | Age: 55
End: 2020-02-01

## 2020-02-01 DIAGNOSIS — R76.8 FALSE POSITIVE SEROLOGICAL TEST FOR HEPATITIS C: ICD-10-CM

## 2020-02-12 ENCOUNTER — TELEPHONE (OUTPATIENT)
Dept: INFECTIOUS DISEASES | Facility: CLINIC | Age: 55
End: 2020-02-12

## 2020-02-12 NOTE — TELEPHONE ENCOUNTER
----- Message from Alvino Mao MD sent at 2/11/2020  2:32 PM EST -----  Regarding: Hep C Lab  I have an order in the computer for him to get another Hep C test during the month of February (order good as of 2/1/20). Could you call to remind him to come to the lab to have it done at his convenience? Thank you.

## 2020-02-15 DIAGNOSIS — E78.5 HYPERLIPIDEMIA, UNSPECIFIED HYPERLIPIDEMIA TYPE: ICD-10-CM

## 2020-02-17 RX ORDER — ATORVASTATIN CALCIUM 20 MG/1
TABLET, FILM COATED ORAL
Qty: 30 TABLET | Refills: 3 | Status: SHIPPED | OUTPATIENT
Start: 2020-02-17 | End: 2020-06-18

## 2020-02-17 NOTE — TELEPHONE ENCOUNTER
Pt phoned into the office on 02/13 and spoke w/MICKEY Perkins.  Maddie read Dr. Carty's message to the patient.  Pt acknowledged.

## 2020-02-19 RX ORDER — OMEPRAZOLE 40 MG/1
CAPSULE, DELAYED RELEASE ORAL
Qty: 60 CAPSULE | Refills: 11 | Status: SHIPPED | OUTPATIENT
Start: 2020-02-19 | End: 2021-02-22

## 2020-02-27 ENCOUNTER — APPOINTMENT (OUTPATIENT)
Dept: LAB | Facility: HOSPITAL | Age: 55
End: 2020-02-27

## 2020-02-27 PROCEDURE — 87522 HEPATITIS C REVRS TRNSCRPJ: CPT | Performed by: INTERNAL MEDICINE

## 2020-02-27 PROCEDURE — 36415 COLL VENOUS BLD VENIPUNCTURE: CPT | Performed by: INTERNAL MEDICINE

## 2020-03-01 LAB
HCV GENTYP SERPL NAA+PROBE: NORMAL
HCV RNA SERPL NAA+PROBE-ACNC: NORMAL IU/ML
HCV RNA SERPL NAA+PROBE-LOG IU: NORMAL {LOG_IU}/ML
REF LAB TEST REF RANGE: NORMAL

## 2020-03-05 RX ORDER — ATENOLOL 50 MG/1
TABLET ORAL
Qty: 30 TABLET | Refills: 11 | Status: SHIPPED | OUTPATIENT
Start: 2020-03-05 | End: 2021-02-26

## 2020-03-05 RX ORDER — LEVOTHYROXINE SODIUM 137 MCG
TABLET ORAL
Qty: 30 TABLET | Refills: 11 | Status: SHIPPED | OUTPATIENT
Start: 2020-03-05 | End: 2021-02-26

## 2020-06-10 LAB
ALBUMIN SERPL-MCNC: 4.9 G/DL (ref 3.5–5.2)
ALBUMIN/GLOB SERPL: 2.9 G/DL
ALP SERPL-CCNC: 141 U/L (ref 39–117)
ALT SERPL-CCNC: 30 U/L (ref 1–41)
AST SERPL-CCNC: 23 U/L (ref 1–40)
BILIRUB SERPL-MCNC: 0.3 MG/DL (ref 0.2–1.2)
BUN SERPL-MCNC: 12 MG/DL (ref 6–20)
BUN/CREAT SERPL: 19 (ref 7–25)
CALCIUM SERPL-MCNC: 8.4 MG/DL (ref 8.6–10.5)
CHLORIDE SERPL-SCNC: 105 MMOL/L (ref 98–107)
CO2 SERPL-SCNC: 26.8 MMOL/L (ref 22–29)
CREAT SERPL-MCNC: 0.63 MG/DL (ref 0.76–1.27)
GLOBULIN SER CALC-MCNC: 1.7 GM/DL
GLUCOSE SERPL-MCNC: 140 MG/DL (ref 65–99)
HBA1C MFR BLD: 6.9 % (ref 4.8–5.6)
POTASSIUM SERPL-SCNC: 4.9 MMOL/L (ref 3.5–5.2)
PROT SERPL-MCNC: 6.6 G/DL (ref 6–8.5)
SODIUM SERPL-SCNC: 140 MMOL/L (ref 136–145)
TSH SERPL DL<=0.005 MIU/L-ACNC: 1.02 UIU/ML (ref 0.27–4.2)

## 2020-06-16 ENCOUNTER — OFFICE VISIT (OUTPATIENT)
Dept: INTERNAL MEDICINE | Facility: CLINIC | Age: 55
End: 2020-06-16

## 2020-06-16 ENCOUNTER — RESULTS ENCOUNTER (OUTPATIENT)
Dept: INTERNAL MEDICINE | Facility: CLINIC | Age: 55
End: 2020-06-16

## 2020-06-16 VITALS
BODY MASS INDEX: 36.22 KG/M2 | SYSTOLIC BLOOD PRESSURE: 118 MMHG | DIASTOLIC BLOOD PRESSURE: 82 MMHG | HEIGHT: 68 IN | WEIGHT: 239 LBS | TEMPERATURE: 97.3 F

## 2020-06-16 DIAGNOSIS — E03.8 OTHER SPECIFIED HYPOTHYROIDISM: ICD-10-CM

## 2020-06-16 DIAGNOSIS — I10 ESSENTIAL HYPERTENSION: ICD-10-CM

## 2020-06-16 DIAGNOSIS — E11.9 TYPE 2 DIABETES MELLITUS WITHOUT COMPLICATION, WITHOUT LONG-TERM CURRENT USE OF INSULIN (HCC): ICD-10-CM

## 2020-06-16 DIAGNOSIS — E78.5 HYPERLIPIDEMIA, UNSPECIFIED HYPERLIPIDEMIA TYPE: ICD-10-CM

## 2020-06-16 DIAGNOSIS — Z00.00 WELLNESS EXAMINATION: Primary | ICD-10-CM

## 2020-06-16 PROCEDURE — 90715 TDAP VACCINE 7 YRS/> IM: CPT | Performed by: INTERNAL MEDICINE

## 2020-06-16 PROCEDURE — 99396 PREV VISIT EST AGE 40-64: CPT | Performed by: INTERNAL MEDICINE

## 2020-06-16 PROCEDURE — 90471 IMMUNIZATION ADMIN: CPT | Performed by: INTERNAL MEDICINE

## 2020-06-16 NOTE — PROGRESS NOTES
Subjective        Chief Complaint   Patient presents with   • Annual Exam   • Hypertension   • Diabetes           Beni Kunz is a 55 y.o. male who presents for    Patient Active Problem List   Diagnosis   • Hyperlipidemia   • Hypertension   • Other specified hypothyroidism   • Type 2 diabetes mellitus, without long-term current use of insulin (CMS/HCC)   • Hepatitis C antibody positive in blood   • Alkaline phosphatase elevation   • Wellness examination       History of Present Illness     He has been checking his BP and it was 128/84. His sugars range from 71 to 200. He is walking some. He denies chest pain, dyspnea, melena or hematochezia.  No Known Allergies    Current Outpatient Medications on File Prior to Visit   Medication Sig Dispense Refill   • atenolol (TENORMIN) 50 MG tablet TAKE 1 TABLET BY MOUTH EVERY DAY 30 tablet 11   • atorvastatin (LIPITOR) 20 MG tablet TAKE 1 TABLET BY MOUTH EVERY DAY 30 tablet 3   • cetirizine (zyrTEC) 10 MG tablet Take 10 mg by mouth Daily.     • fluticasone (FLONASE) 50 MCG/ACT nasal spray SPRAY 1-2 SPRAYS INTO EACH NOSTRIL EVERY DAY  11   • omeprazole (priLOSEC) 40 MG capsule TAKE 1 CAPSULE BY MOUTH TWICE A DAY 60 capsule 11   • SYNTHROID 137 MCG tablet TAKE 1 TABLET BY MOUTH DAILY ON AN EMPTY STOMACH FOR THYROID (MD DNS) 30 tablet 11     No current facility-administered medications on file prior to visit.        Past Medical History:   Diagnosis Date   • Abnormal liver enzymes    • Allergic rhinitis    • Esophagitis    • Fatty liver    • GERD (gastroesophageal reflux disease)    • History of allergy    • Hyperlipidemia    • Hypertension    • IgA deficiency (CMS/HCC)    • ROYCE (obstructive sleep apnea)    • Tubular adenoma of colon 01/2016   • Urticaria        Past Surgical History:   Procedure Laterality Date   • COLONOSCOPY  05/08/2019    repeat 3 years Dr. Mahendra Michelle   • ESOPHAGEAL DILATATION  2018       Family History   Problem Relation Age of Onset   • Cancer Mother          sinus   • Heart disease Mother    • Arthritis Father    • Heart disease Father    • Colon polyps Father    • Stroke Father    • Colon cancer Brother    • Diabetes Maternal Aunt        Social History     Socioeconomic History   • Marital status: Single     Spouse name: Not on file   • Number of children: Not on file   • Years of education: Not on file   • Highest education level: Not on file   Tobacco Use   • Smoking status: Never Smoker   • Smokeless tobacco: Never Used   Substance and Sexual Activity   • Alcohol use: No     Frequency: Never   • Drug use: No   • Sexual activity: Yes     Birth control/protection: Condom           The following portions of the patient's history were reviewed and updated as appropriate: problem list, allergies, current medications, past medical history, past family history, past social history and past surgical history.    Review of Systems   Constitutional: Negative for chills, fever and unexpected weight loss.   HENT: Negative for postnasal drip and sore throat.    Eyes: Negative for blurred vision.   Respiratory: Negative for cough, shortness of breath and wheezing.    Cardiovascular: Negative for chest pain and leg swelling.   Gastrointestinal: Negative for abdominal pain, blood in stool, nausea, vomiting and GERD.   Endocrine: Negative for polyuria.   Genitourinary: Negative for dysuria, frequency and hematuria.   Musculoskeletal: Negative for gait problem.   Skin: Negative for rash.   Allergic/Immunologic: Negative for immunocompromised state.   Neurological: Negative for weakness.   Hematological: Does not bruise/bleed easily.   Psychiatric/Behavioral: Negative for depressed mood. The patient is not nervous/anxious.        Immunization History   Administered Date(s) Administered   • Flu Vaccine Quad PF >18YRS 12/05/2017, 11/01/2018   • Hepatitis A 01/23/2018, 08/15/2018   • Hepatitis B 01/23/2018, 08/15/2018, 02/15/2019   • Pneumococcal Polysaccharide (PPSV23) 02/15/2019   •  "Tdap 01/01/2010, 06/16/2020   • flucelvax quad pfs =>4 YRS 09/26/2019       Objective   Vitals:    06/16/20 1055   BP: 118/82   Temp: 97.3 °F (36.3 °C)   Weight: 108 kg (239 lb)   Height: 172.7 cm (68\")     Body mass index is 36.34 kg/m².  Physical Exam   Constitutional: He appears well-developed and well-nourished.   HENT:   Head: Normocephalic and atraumatic.   Eyes: Pupils are equal, round, and reactive to light. Conjunctivae and EOM are normal.   Neck: Neck supple. Carotid bruit is not present. No thyromegaly present.   Cardiovascular: Normal rate, regular rhythm and normal heart sounds.   No murmur heard.  Pulmonary/Chest: Effort normal and breath sounds normal.   Abdominal: Soft. He exhibits no distension and no mass. There is no tenderness. There is no rebound.    Bnei had a diabetic foot exam performed today.   During the foot exam he had a monofilament test performed.  Vascular Status -  His right foot exhibits no edema. His left foot exhibits no edema.  Skin Integrity  -  His right foot skin is intact.His left foot skin is intact..  Lymphadenopathy:     He has no cervical adenopathy.   Neurological: He is alert.   Skin: Skin is warm.   Psychiatric: He has a normal mood and affect.   Vitals reviewed.      Procedures    Assessment/Plan   Beni was seen today for annual exam, hypertension and diabetes.    Diagnoses and all orders for this visit:    Wellness examination    Type 2 diabetes mellitus without complication, without long-term current use of insulin (CMS/[facility])  -     Ambulatory Referral to Diabetic Education  -     Comprehensive Metabolic Panel; Future  -     Hemoglobin A1c; Future  -     Microalbumin / Creatinine Urine Ratio - Urine, Clean Catch; Future    Other specified hypothyroidism  -     TSH; Future    Essential hypertension    Hyperlipidemia, unspecified hyperlipidemia type  -     Lipid Panel With / Chol / HDL Ratio; Future    Other orders  -     Tdap Vaccine Greater Than or Equal To 8yo " IM             Tdap today. Recc 150 minutes exercise per week to lose weight. Set up diabetes education. He will see eye doctor soon. Labs reviewed. BP is good.    Return in about 3 months (around 9/16/2020).

## 2020-06-18 DIAGNOSIS — E78.5 HYPERLIPIDEMIA, UNSPECIFIED HYPERLIPIDEMIA TYPE: ICD-10-CM

## 2020-06-18 RX ORDER — ATORVASTATIN CALCIUM 20 MG/1
TABLET, FILM COATED ORAL
Qty: 30 TABLET | Refills: 3 | Status: SHIPPED | OUTPATIENT
Start: 2020-06-18 | End: 2020-10-26

## 2020-08-04 ENCOUNTER — HOSPITAL ENCOUNTER (OUTPATIENT)
Dept: DIABETES SERVICES | Facility: HOSPITAL | Age: 55
Setting detail: RECURRING SERIES
Discharge: HOME | End: 2020-08-04

## 2020-08-04 PROCEDURE — G0109 DIAB MANAGE TRN IND/GROUP: HCPCS

## 2020-08-11 ENCOUNTER — HOSPITAL ENCOUNTER (OUTPATIENT)
Dept: DIABETES SERVICES | Facility: HOSPITAL | Age: 55
Setting detail: RECURRING SERIES
Discharge: HOME | End: 2020-08-11

## 2020-08-11 PROCEDURE — G0109 DIAB MANAGE TRN IND/GROUP: HCPCS

## 2020-08-18 ENCOUNTER — HOSPITAL ENCOUNTER (OUTPATIENT)
Dept: DIABETES SERVICES | Facility: HOSPITAL | Age: 55
Setting detail: RECURRING SERIES
Discharge: HOME | End: 2020-08-18

## 2020-08-18 PROCEDURE — G0108 DIAB MANAGE TRN  PER INDIV: HCPCS | Performed by: DIETITIAN, REGISTERED

## 2020-09-09 LAB
ALBUMIN SERPL-MCNC: 4.7 G/DL (ref 3.5–5.2)
ALBUMIN/CREAT UR: 3 MG/G CREAT (ref 0–29)
ALBUMIN/GLOB SERPL: 2.8 G/DL
ALP SERPL-CCNC: 143 U/L (ref 39–117)
ALT SERPL-CCNC: 24 U/L (ref 1–41)
AST SERPL-CCNC: 23 U/L (ref 1–40)
BILIRUB SERPL-MCNC: 0.5 MG/DL (ref 0–1.2)
BUN SERPL-MCNC: 18 MG/DL (ref 6–20)
BUN/CREAT SERPL: 15.5 (ref 7–25)
CALCIUM SERPL-MCNC: 9.2 MG/DL (ref 8.6–10.5)
CHLORIDE SERPL-SCNC: 105 MMOL/L (ref 98–107)
CHOLEST SERPL-MCNC: 120 MG/DL (ref 0–200)
CHOLEST/HDLC SERPL: 3.64 {RATIO}
CO2 SERPL-SCNC: 24.5 MMOL/L (ref 22–29)
CREAT SERPL-MCNC: 1.16 MG/DL (ref 0.76–1.27)
CREAT UR-MCNC: 273.2 MG/DL
GLOBULIN SER CALC-MCNC: 1.7 GM/DL
GLUCOSE SERPL-MCNC: 119 MG/DL (ref 65–99)
HBA1C MFR BLD: 6.2 % (ref 4.8–5.6)
HDLC SERPL-MCNC: 33 MG/DL (ref 40–60)
LDLC SERPL CALC-MCNC: 69 MG/DL (ref 0–100)
MICROALBUMIN UR-MCNC: 8.6 UG/ML
POTASSIUM SERPL-SCNC: 4.5 MMOL/L (ref 3.5–5.2)
PROT SERPL-MCNC: 6.4 G/DL (ref 6–8.5)
SODIUM SERPL-SCNC: 142 MMOL/L (ref 136–145)
TRIGL SERPL-MCNC: 92 MG/DL (ref 0–150)
TSH SERPL DL<=0.005 MIU/L-ACNC: 1.68 UIU/ML (ref 0.27–4.2)
VLDLC SERPL CALC-MCNC: 18.4 MG/DL

## 2020-09-14 ENCOUNTER — RESULTS ENCOUNTER (OUTPATIENT)
Dept: INTERNAL MEDICINE | Facility: CLINIC | Age: 55
End: 2020-09-14

## 2020-09-14 DIAGNOSIS — E78.5 HYPERLIPIDEMIA, UNSPECIFIED HYPERLIPIDEMIA TYPE: ICD-10-CM

## 2020-09-14 DIAGNOSIS — E11.9 TYPE 2 DIABETES MELLITUS WITHOUT COMPLICATION, WITHOUT LONG-TERM CURRENT USE OF INSULIN (HCC): ICD-10-CM

## 2020-09-14 DIAGNOSIS — E03.8 OTHER SPECIFIED HYPOTHYROIDISM: ICD-10-CM

## 2020-09-15 ENCOUNTER — OFFICE VISIT (OUTPATIENT)
Dept: INTERNAL MEDICINE | Facility: CLINIC | Age: 55
End: 2020-09-15

## 2020-09-15 VITALS
TEMPERATURE: 97.9 F | BODY MASS INDEX: 35.01 KG/M2 | SYSTOLIC BLOOD PRESSURE: 124 MMHG | WEIGHT: 231 LBS | DIASTOLIC BLOOD PRESSURE: 80 MMHG | HEIGHT: 68 IN

## 2020-09-15 DIAGNOSIS — I10 ESSENTIAL HYPERTENSION: Primary | ICD-10-CM

## 2020-09-15 DIAGNOSIS — E03.8 OTHER SPECIFIED HYPOTHYROIDISM: ICD-10-CM

## 2020-09-15 DIAGNOSIS — E11.9 TYPE 2 DIABETES MELLITUS WITHOUT COMPLICATION, WITHOUT LONG-TERM CURRENT USE OF INSULIN (HCC): ICD-10-CM

## 2020-09-15 PROBLEM — E78.49 OTHER HYPERLIPIDEMIA: Status: ACTIVE | Noted: 2019-06-24

## 2020-09-15 PROBLEM — R76.8 HEPATITIS C ANTIBODY POSITIVE IN BLOOD: Status: RESOLVED | Noted: 2019-09-26 | Resolved: 2020-09-15

## 2020-09-15 PROBLEM — Z00.00 WELLNESS EXAMINATION: Status: RESOLVED | Noted: 2020-06-16 | Resolved: 2020-09-15

## 2020-09-15 PROCEDURE — 90471 IMMUNIZATION ADMIN: CPT | Performed by: INTERNAL MEDICINE

## 2020-09-15 PROCEDURE — 90686 IIV4 VACC NO PRSV 0.5 ML IM: CPT | Performed by: INTERNAL MEDICINE

## 2020-09-15 PROCEDURE — 99213 OFFICE O/P EST LOW 20 MIN: CPT | Performed by: INTERNAL MEDICINE

## 2020-09-15 NOTE — PROGRESS NOTES
Subjective        Chief Complaint   Patient presents with   • Diabetes     follow up           Beni Kunz is a 55 y.o. male who presents for    Patient Active Problem List   Diagnosis   • Other hyperlipidemia   • Essential hypertension   • Other specified hypothyroidism   • Type 2 diabetes mellitus without complication, without long-term current use of insulin (CMS/[facility])   • Alkaline phosphatase elevation       History of Present Illness     He denies chest pain or dyspnea. His sugar was 117 this am. He has been checking his BP and it has been 128/86. He went to diabetes ed three weeks and it has helped him.  No Known Allergies    Current Outpatient Medications on File Prior to Visit   Medication Sig Dispense Refill   • atenolol (TENORMIN) 50 MG tablet TAKE 1 TABLET BY MOUTH EVERY DAY 30 tablet 11   • atorvastatin (LIPITOR) 20 MG tablet TAKE 1 TABLET BY MOUTH EVERY DAY 30 tablet 3   • cetirizine (zyrTEC) 10 MG tablet Take 10 mg by mouth Daily.     • fluticasone (FLONASE) 50 MCG/ACT nasal spray SPRAY 1-2 SPRAYS INTO EACH NOSTRIL EVERY DAY  11   • omeprazole (priLOSEC) 40 MG capsule TAKE 1 CAPSULE BY MOUTH TWICE A DAY 60 capsule 11   • SYNTHROID 137 MCG tablet TAKE 1 TABLET BY MOUTH DAILY ON AN EMPTY STOMACH FOR THYROID (MD DNS) 30 tablet 11     No current facility-administered medications on file prior to visit.        Past Medical History:   Diagnosis Date   • Allergic rhinitis    • Esophagitis    • Fatty liver    • GERD (gastroesophageal reflux disease)    • Hepatitis C antibody positive in blood 9/26/2019   • IgA deficiency (CMS/[facility])    • ROYCE (obstructive sleep apnea)    • Tubular adenoma of colon 01/2016   • Urticaria        Past Surgical History:   Procedure Laterality Date   • COLONOSCOPY  05/08/2019    repeat 3 years Dr. Mahendra Michelle   • ESOPHAGEAL DILATATION  2018       Family History   Problem Relation Age of Onset   • Cancer Mother         sinus   • Heart disease Mother    • Arthritis Father    • Heart  "disease Father    • Colon polyps Father    • Stroke Father    • Colon cancer Brother    • Diabetes Maternal Aunt        Social History     Socioeconomic History   • Marital status: Single     Spouse name: Not on file   • Number of children: Not on file   • Years of education: Not on file   • Highest education level: Not on file   Tobacco Use   • Smoking status: Never Smoker   • Smokeless tobacco: Never Used   Substance and Sexual Activity   • Alcohol use: No     Frequency: Never   • Drug use: No   • Sexual activity: Yes     Birth control/protection: Condom           The following portions of the patient's history were reviewed and updated as appropriate: problem list, allergies, current medications, past medical history, past family history, past social history and past surgical history.    Review of Systems   Respiratory: Negative for shortness of breath.    Cardiovascular: Negative for chest pain.       Immunization History   Administered Date(s) Administered   • Flu Vaccine Quad PF >18YRS 12/05/2017, 11/01/2018   • Flulaval/Fluarix Quad 09/15/2020   • Hepatitis A 01/23/2018, 08/15/2018   • Hepatitis B 01/23/2018, 08/15/2018, 02/15/2019   • Pneumococcal Polysaccharide (PPSV23) 02/15/2019   • Tdap 01/01/2010, 06/16/2020   • flucelvax quad pfs =>4 YRS 09/26/2019       Objective   Vitals:    09/15/20 1302   BP: 124/80   Temp: 97.9 °F (36.6 °C)   Weight: 105 kg (231 lb)   Height: 172.7 cm (68\")     Body mass index is 35.12 kg/m².  Physical Exam  Vitals signs reviewed.   Constitutional:       Appearance: He is well-developed.   HENT:      Head: Normocephalic and atraumatic.   Cardiovascular:      Rate and Rhythm: Normal rate and regular rhythm.      Heart sounds: Normal heart sounds, S1 normal and S2 normal.   Pulmonary:      Effort: Pulmonary effort is normal.      Breath sounds: Normal breath sounds.   Skin:     General: Skin is warm.   Neurological:      Mental Status: He is alert. "         Procedures    Assessment/Plan   Beni was seen today for diabetes.    Diagnoses and all orders for this visit:    Essential hypertension    Other specified hypothyroidism  -     TSH; Future    Type 2 diabetes mellitus without complication, without long-term current use of insulin (CMS/[facility])  -     Comprehensive Metabolic Panel; Future  -     Hemoglobin A1c; Future    Other orders  -     FluLaval Quad >6 Months (4508-5565)               Flu shot. BP, a1c and TSH are at goal.  Return in about 6 months (around 3/15/2021).

## 2020-10-24 DIAGNOSIS — E78.5 HYPERLIPIDEMIA, UNSPECIFIED HYPERLIPIDEMIA TYPE: ICD-10-CM

## 2020-10-26 RX ORDER — ATORVASTATIN CALCIUM 20 MG/1
TABLET, FILM COATED ORAL
Qty: 30 TABLET | Refills: 5 | Status: SHIPPED | OUTPATIENT
Start: 2020-10-26 | End: 2021-03-26

## 2021-02-22 RX ORDER — OMEPRAZOLE 40 MG/1
CAPSULE, DELAYED RELEASE ORAL
Qty: 60 CAPSULE | Refills: 11 | Status: SHIPPED | OUTPATIENT
Start: 2021-02-22 | End: 2022-02-23

## 2021-02-26 RX ORDER — ATENOLOL 50 MG/1
TABLET ORAL
Qty: 30 TABLET | Refills: 11 | Status: SHIPPED | OUTPATIENT
Start: 2021-02-26 | End: 2021-03-02

## 2021-02-26 RX ORDER — LEVOTHYROXINE SODIUM 137 MCG
TABLET ORAL
Qty: 30 TABLET | Refills: 11 | Status: SHIPPED | OUTPATIENT
Start: 2021-02-26 | End: 2021-03-02

## 2021-03-02 RX ORDER — ATENOLOL 50 MG/1
TABLET ORAL
Qty: 30 TABLET | Refills: 11 | Status: SHIPPED | OUTPATIENT
Start: 2021-03-02 | End: 2022-03-25

## 2021-03-02 RX ORDER — LEVOTHYROXINE SODIUM 137 MCG
TABLET ORAL
Qty: 30 TABLET | Refills: 11 | Status: SHIPPED | OUTPATIENT
Start: 2021-03-02 | End: 2021-03-18

## 2021-03-09 DIAGNOSIS — E11.9 TYPE 2 DIABETES MELLITUS WITHOUT COMPLICATION, WITHOUT LONG-TERM CURRENT USE OF INSULIN (HCC): ICD-10-CM

## 2021-03-09 DIAGNOSIS — E03.8 OTHER SPECIFIED HYPOTHYROIDISM: ICD-10-CM

## 2021-03-12 LAB
ALBUMIN SERPL-MCNC: 4.7 G/DL (ref 3.5–5.2)
ALBUMIN/GLOB SERPL: 2.2 G/DL
ALP SERPL-CCNC: 132 U/L (ref 39–117)
ALT SERPL-CCNC: 26 U/L (ref 1–41)
AST SERPL-CCNC: 23 U/L (ref 1–40)
BILIRUB SERPL-MCNC: 0.6 MG/DL (ref 0–1.2)
BUN SERPL-MCNC: 15 MG/DL (ref 6–20)
BUN/CREAT SERPL: 14.7 (ref 7–25)
CALCIUM SERPL-MCNC: 9.5 MG/DL (ref 8.6–10.5)
CHLORIDE SERPL-SCNC: 102 MMOL/L (ref 98–107)
CO2 SERPL-SCNC: 24.8 MMOL/L (ref 22–29)
CREAT SERPL-MCNC: 1.02 MG/DL (ref 0.76–1.27)
GLOBULIN SER CALC-MCNC: 2.1 GM/DL
GLUCOSE SERPL-MCNC: 113 MG/DL (ref 65–99)
HBA1C MFR BLD: 6.1 % (ref 4.8–5.6)
POTASSIUM SERPL-SCNC: 4.4 MMOL/L (ref 3.5–5.2)
PROT SERPL-MCNC: 6.8 G/DL (ref 6–8.5)
SODIUM SERPL-SCNC: 138 MMOL/L (ref 136–145)
TSH SERPL DL<=0.005 MIU/L-ACNC: 5.26 UIU/ML (ref 0.27–4.2)

## 2021-03-18 ENCOUNTER — OFFICE VISIT (OUTPATIENT)
Dept: INTERNAL MEDICINE | Facility: CLINIC | Age: 56
End: 2021-03-18

## 2021-03-18 VITALS
DIASTOLIC BLOOD PRESSURE: 76 MMHG | HEIGHT: 68 IN | SYSTOLIC BLOOD PRESSURE: 118 MMHG | WEIGHT: 228 LBS | TEMPERATURE: 97 F | BODY MASS INDEX: 34.56 KG/M2

## 2021-03-18 DIAGNOSIS — E11.9 TYPE 2 DIABETES MELLITUS WITHOUT COMPLICATION, WITHOUT LONG-TERM CURRENT USE OF INSULIN (HCC): Chronic | ICD-10-CM

## 2021-03-18 DIAGNOSIS — E03.8 OTHER SPECIFIED HYPOTHYROIDISM: Primary | Chronic | ICD-10-CM

## 2021-03-18 DIAGNOSIS — I10 ESSENTIAL HYPERTENSION: Chronic | ICD-10-CM

## 2021-03-18 PROCEDURE — 99214 OFFICE O/P EST MOD 30 MIN: CPT | Performed by: INTERNAL MEDICINE

## 2021-03-18 RX ORDER — LEVOTHYROXINE SODIUM 150 MCG
150 TABLET ORAL DAILY
Qty: 30 TABLET | Refills: 3 | Status: SHIPPED | OUTPATIENT
Start: 2021-03-18 | End: 2021-07-16

## 2021-03-18 NOTE — PROGRESS NOTES
Subjective        Chief Complaint   Patient presents with   • Hypertension           Beni Kunz is a 55 y.o. male who presents for    Patient Active Problem List   Diagnosis   • Other hyperlipidemia   • Essential hypertension   • Other specified hypothyroidism   • Type 2 diabetes mellitus without complication, without long-term current use of insulin (CMS/HCC)   • Alkaline phosphatase elevation       History of Present Illness     His BP has been 128/78. He denies chest pain or dyspnea. He has been checking his sugars and they run 120s in the am. He exercises twice per week. He has not missed any doses of his thyroid medication.  No Known Allergies    Current Outpatient Medications on File Prior to Visit   Medication Sig Dispense Refill   • atenolol (TENORMIN) 50 MG tablet TAKE 1 TABLET BY MOUTH EVERY DAY 30 tablet 11   • atorvastatin (LIPITOR) 20 MG tablet TAKE 1 TABLET BY MOUTH EVERY DAY 30 tablet 5   • cetirizine (zyrTEC) 10 MG tablet Take 10 mg by mouth Daily.     • fluticasone (FLONASE) 50 MCG/ACT nasal spray SPRAY 1-2 SPRAYS INTO EACH NOSTRIL EVERY DAY  11   • omeprazole (priLOSEC) 40 MG capsule TAKE 1 CAPSULE BY MOUTH TWICE A DAY 60 capsule 11   • [DISCONTINUED] Synthroid 137 MCG tablet TAKE 1 TABLET BY MOUTH DAILY ON AN EMPTY STOMACH FOR THYROID (MD DNS) 30 tablet 11     No current facility-administered medications on file prior to visit.       Past Medical History:   Diagnosis Date   • Allergic rhinitis    • Esophagitis    • Fatty liver    • GERD (gastroesophageal reflux disease)    • Hepatitis C antibody positive in blood 9/26/2019   • IgA deficiency (CMS/HCC)    • ROYCE (obstructive sleep apnea)    • Tubular adenoma of colon 01/2016   • Urticaria        Past Surgical History:   Procedure Laterality Date   • COLONOSCOPY  05/08/2019    repeat 3 years Dr. Mahendra Michelle   • ESOPHAGEAL DILATATION  2018       Family History   Problem Relation Age of Onset   • Cancer Mother         sinus   • Heart disease Mother   "  • Arthritis Father    • Heart disease Father    • Colon polyps Father    • Stroke Father    • Colon cancer Brother    • Diabetes Maternal Aunt        Social History     Socioeconomic History   • Marital status: Single     Spouse name: Not on file   • Number of children: Not on file   • Years of education: Not on file   • Highest education level: Not on file   Tobacco Use   • Smoking status: Never Smoker   • Smokeless tobacco: Never Used   Substance and Sexual Activity   • Alcohol use: No   • Drug use: No   • Sexual activity: Yes     Birth control/protection: Condom           The following portions of the patient's history were reviewed and updated as appropriate: problem list, allergies, current medications, past medical history, past family history, past social history and past surgical history.    Review of Systems    Immunization History   Administered Date(s) Administered   • Flu Vaccine Quad PF >18YRS 12/05/2017, 11/01/2018   • Flulaval/Fluarix/Fluzone Quad 09/15/2020   • Hepatitis A 01/23/2018, 08/15/2018   • Hepatitis B 01/23/2018, 08/15/2018, 02/15/2019   • Pneumococcal Polysaccharide (PPSV23) 02/15/2019   • Tdap 01/01/2010, 06/16/2020   • flucelvax quad pfs =>4 YRS 09/26/2019       Objective   Vitals:    03/18/21 1258   BP: 118/76   Temp: 97 °F (36.1 °C)   Weight: 103 kg (228 lb)   Height: 172.7 cm (68\")     Body mass index is 34.67 kg/m².  Physical Exam  Vitals reviewed.   Constitutional:       Appearance: He is well-developed.   HENT:      Head: Normocephalic and atraumatic.   Neck:      Thyroid: No thyroid mass or thyromegaly.   Cardiovascular:      Rate and Rhythm: Normal rate and regular rhythm.      Heart sounds: Normal heart sounds, S1 normal and S2 normal.   Pulmonary:      Effort: Pulmonary effort is normal.      Breath sounds: Normal breath sounds.   Lymphadenopathy:      Cervical:      Right cervical: No superficial cervical adenopathy.  Skin:     General: Skin is warm.   Neurological:      " Mental Status: He is alert.   Psychiatric:         Behavior: Behavior normal.         Procedures    Assessment/Plan   Diagnoses and all orders for this visit:    1. Other specified hypothyroidism (Primary)  -     Synthroid 150 MCG tablet; Take 1 tablet by mouth Daily.  Dispense: 30 tablet; Refill: 3  -     TSH; Future    2. Type 2 diabetes mellitus without complication, without long-term current use of insulin (CMS/[facility])    3. Essential hypertension               Reviewed tsh, a1c, and cmp. BP is good. Increase synthroid.  Return in about 6 weeks (around 4/29/2021) for Lab Before FUP.

## 2021-03-24 ENCOUNTER — BULK ORDERING (OUTPATIENT)
Dept: CASE MANAGEMENT | Facility: OTHER | Age: 56
End: 2021-03-24

## 2021-03-24 DIAGNOSIS — Z23 IMMUNIZATION DUE: ICD-10-CM

## 2021-03-26 DIAGNOSIS — E78.5 HYPERLIPIDEMIA, UNSPECIFIED HYPERLIPIDEMIA TYPE: ICD-10-CM

## 2021-03-26 RX ORDER — ATORVASTATIN CALCIUM 20 MG/1
TABLET, FILM COATED ORAL
Qty: 30 TABLET | Refills: 5 | Status: SHIPPED | OUTPATIENT
Start: 2021-03-26 | End: 2021-10-13 | Stop reason: SDUPTHER

## 2021-04-20 DIAGNOSIS — E03.8 OTHER SPECIFIED HYPOTHYROIDISM: Chronic | ICD-10-CM

## 2021-04-23 LAB — TSH SERPL DL<=0.005 MIU/L-ACNC: 2.29 UIU/ML (ref 0.27–4.2)

## 2021-04-29 ENCOUNTER — OFFICE VISIT (OUTPATIENT)
Dept: INTERNAL MEDICINE | Facility: CLINIC | Age: 56
End: 2021-04-29

## 2021-04-29 VITALS
HEIGHT: 68 IN | TEMPERATURE: 97.8 F | WEIGHT: 229 LBS | BODY MASS INDEX: 34.71 KG/M2 | SYSTOLIC BLOOD PRESSURE: 120 MMHG | DIASTOLIC BLOOD PRESSURE: 82 MMHG

## 2021-04-29 DIAGNOSIS — E03.8 OTHER SPECIFIED HYPOTHYROIDISM: Primary | Chronic | ICD-10-CM

## 2021-04-29 DIAGNOSIS — E78.49 OTHER HYPERLIPIDEMIA: ICD-10-CM

## 2021-04-29 DIAGNOSIS — E11.9 TYPE 2 DIABETES MELLITUS WITHOUT COMPLICATION, WITHOUT LONG-TERM CURRENT USE OF INSULIN (HCC): Chronic | ICD-10-CM

## 2021-04-29 PROCEDURE — 99213 OFFICE O/P EST LOW 20 MIN: CPT | Performed by: INTERNAL MEDICINE

## 2021-04-29 NOTE — PROGRESS NOTES
Subjective        Chief Complaint   Patient presents with   • Hypothyroidism           Beni Kunz is a 55 y.o. male who presents for    Patient Active Problem List   Diagnosis   • Other hyperlipidemia   • Essential hypertension   • Other specified hypothyroidism   • Type 2 diabetes mellitus without complication, without long-term current use of insulin (CMS/HCC)   • Alkaline phosphatase elevation       History of Present Illness     He denies chest pain or dyspnea. He feels no different with increased synthroid.  No Known Allergies    Current Outpatient Medications on File Prior to Visit   Medication Sig Dispense Refill   • atenolol (TENORMIN) 50 MG tablet TAKE 1 TABLET BY MOUTH EVERY DAY 30 tablet 11   • atorvastatin (LIPITOR) 20 MG tablet TAKE 1 TABLET BY MOUTH EVERY DAY 30 tablet 5   • cetirizine (zyrTEC) 10 MG tablet Take 10 mg by mouth Daily.     • fluticasone (FLONASE) 50 MCG/ACT nasal spray SPRAY 1-2 SPRAYS INTO EACH NOSTRIL EVERY DAY  11   • omeprazole (priLOSEC) 40 MG capsule TAKE 1 CAPSULE BY MOUTH TWICE A DAY 60 capsule 11   • Synthroid 150 MCG tablet Take 1 tablet by mouth Daily. 30 tablet 3     No current facility-administered medications on file prior to visit.       Past Medical History:   Diagnosis Date   • Allergic rhinitis    • Esophagitis    • Fatty liver    • GERD (gastroesophageal reflux disease)    • Hepatitis C antibody positive in blood 9/26/2019   • IgA deficiency (CMS/HCC)    • ROYCE (obstructive sleep apnea)    • Tubular adenoma of colon 01/2016   • Urticaria        Past Surgical History:   Procedure Laterality Date   • COLONOSCOPY  05/08/2019    repeat 3 years Dr. Mahendra Michelle   • ESOPHAGEAL DILATATION  2018       Family History   Problem Relation Age of Onset   • Cancer Mother         sinus   • Heart disease Mother    • Arthritis Father    • Heart disease Father    • Colon polyps Father    • Stroke Father    • Colon cancer Brother    • Diabetes Maternal Aunt        Social History  "    Socioeconomic History   • Marital status: Single     Spouse name: Not on file   • Number of children: Not on file   • Years of education: Not on file   • Highest education level: Not on file   Tobacco Use   • Smoking status: Never Smoker   • Smokeless tobacco: Never Used   Substance and Sexual Activity   • Alcohol use: No   • Drug use: No   • Sexual activity: Yes     Birth control/protection: Condom           The following portions of the patient's history were reviewed and updated as appropriate: problem list, allergies, current medications, past medical history, past family history, past social history and past surgical history.    Review of Systems    Immunization History   Administered Date(s) Administered   • COVID-19 (PFIZER) 04/01/2021, 04/22/2021   • Flu Vaccine Quad PF >18YRS 12/05/2017, 11/01/2018   • Flulaval/Fluarix/Fluzone Quad 09/15/2020   • Hepatitis A 01/23/2018, 08/15/2018   • Hepatitis B 01/23/2018, 08/15/2018, 02/15/2019   • Pneumococcal Polysaccharide (PPSV23) 02/15/2019   • Tdap 01/01/2010, 06/16/2020   • flucelvax quad pfs =>4 YRS 09/26/2019       Objective   Vitals:    04/29/21 1420   BP: 120/82   Temp: 97.8 °F (36.6 °C)   Weight: 104 kg (229 lb)   Height: 172.7 cm (68\")     Body mass index is 34.82 kg/m².  Physical Exam  Vitals reviewed.   Constitutional:       Appearance: He is well-developed.   HENT:      Head: Normocephalic and atraumatic.   Cardiovascular:      Rate and Rhythm: Normal rate and regular rhythm.      Heart sounds: Normal heart sounds, S1 normal and S2 normal.   Pulmonary:      Effort: Pulmonary effort is normal.      Breath sounds: Normal breath sounds.   Skin:     General: Skin is warm.   Neurological:      Mental Status: He is alert.   Psychiatric:         Behavior: Behavior normal.         Procedures    Assessment/Plan   Diagnoses and all orders for this visit:    1. Other specified hypothyroidism (Primary)  -     TSH; Future    2. Type 2 diabetes mellitus without " complication, without long-term current use of insulin (CMS/[facility])  -     Comprehensive Metabolic Panel; Future  -     Hemoglobin A1c; Future  -     Microalbumin / Creatinine Urine Ratio - Urine, Clean Catch; Future    3. Other hyperlipidemia  -     Lipid Panel With / Chol / HDL Ratio; Future               TSH at goal. [facility] [address] [facility] at drug store. [facility] [address] eye exam.  Return in about 6 months (around 10/29/2021) for Annual physical, Lab Before FUP.

## 2021-07-16 DIAGNOSIS — E03.8 OTHER SPECIFIED HYPOTHYROIDISM: Chronic | ICD-10-CM

## 2021-07-16 RX ORDER — LEVOTHYROXINE SODIUM 150 MCG
TABLET ORAL
Qty: 30 TABLET | Refills: 3 | Status: SHIPPED | OUTPATIENT
Start: 2021-07-16 | End: 2021-07-20 | Stop reason: SDUPTHER

## 2021-07-20 ENCOUNTER — TELEPHONE (OUTPATIENT)
Dept: INTERNAL MEDICINE | Facility: CLINIC | Age: 56
End: 2021-07-20

## 2021-07-20 ENCOUNTER — OFFICE VISIT (OUTPATIENT)
Dept: INTERNAL MEDICINE | Facility: CLINIC | Age: 56
End: 2021-07-20

## 2021-07-20 VITALS
SYSTOLIC BLOOD PRESSURE: 140 MMHG | TEMPERATURE: 97.8 F | DIASTOLIC BLOOD PRESSURE: 82 MMHG | WEIGHT: 225 LBS | BODY MASS INDEX: 34.1 KG/M2 | HEIGHT: 68 IN

## 2021-07-20 DIAGNOSIS — F41.9 ANXIETY: Primary | ICD-10-CM

## 2021-07-20 DIAGNOSIS — E03.8 OTHER SPECIFIED HYPOTHYROIDISM: Chronic | ICD-10-CM

## 2021-07-20 PROCEDURE — 99213 OFFICE O/P EST LOW 20 MIN: CPT | Performed by: INTERNAL MEDICINE

## 2021-07-20 RX ORDER — BUSPIRONE HYDROCHLORIDE 10 MG/1
10 TABLET ORAL 3 TIMES DAILY PRN
Qty: 45 TABLET | Refills: 0 | Status: SHIPPED | OUTPATIENT
Start: 2021-07-20 | End: 2021-08-03

## 2021-07-20 RX ORDER — LEVOTHYROXINE SODIUM 150 MCG
150 TABLET ORAL DAILY
Qty: 30 TABLET | Refills: 11 | Status: SHIPPED | OUTPATIENT
Start: 2021-07-20 | End: 2022-06-09

## 2021-07-20 NOTE — TELEPHONE ENCOUNTER
Caller: Beni Kunz    Relationship to patient: Self    Best call back number: [phone]    Patient is needing: PATIENT CALLED IN AND SAID THAT THE PHARMACY IS UNABLE TO FILL HIS    Synthroid 150 MCG tablet   . HE WAS TOLD THAT INSURANCE IS NO LONGER COVERING THE NAME BRAND AND NEED APPROVAL FROM THE DOCTOR TO FILL. PLEASE CALL PATIENT AND ADVISE.

## 2021-07-20 NOTE — PROGRESS NOTES
Subjective        Chief Complaint   Patient presents with   • Anxiety           Beni Kunz is a 56 y.o. male who presents for    Patient Active Problem List   Diagnosis   • Other hyperlipidemia   • Essential hypertension   • Other specified hypothyroidism   • Type 2 diabetes mellitus without complication, without long-term current use of insulin (CMS/HCC)   • Alkaline phosphatase elevation       History of Present Illness     His father is dying. He is making a decision about palliative care. He is sweaty. He has to take deep breaths. He is having a hard time relaxing. It was worse after taking his dad to the ER yesterday. He paced last night. He has had some anxiety in the past.   No Known Allergies    Current Outpatient Medications on File Prior to Visit   Medication Sig Dispense Refill   • atenolol (TENORMIN) 50 MG tablet TAKE 1 TABLET BY MOUTH EVERY DAY 30 tablet 11   • atorvastatin (LIPITOR) 20 MG tablet TAKE 1 TABLET BY MOUTH EVERY DAY 30 tablet 5   • cetirizine (zyrTEC) 10 MG tablet Take 10 mg by mouth Daily.     • fluticasone (FLONASE) 50 MCG/ACT nasal spray SPRAY 1-2 SPRAYS INTO EACH NOSTRIL EVERY DAY  11   • omeprazole (priLOSEC) 40 MG capsule TAKE 1 CAPSULE BY MOUTH TWICE A DAY 60 capsule 11   • [DISCONTINUED] Synthroid 150 MCG tablet TAKE 1 TABLET BY MOUTH EVERY DAY 30 tablet 3     No current facility-administered medications on file prior to visit.       Past Medical History:   Diagnosis Date   • Allergic rhinitis    • Esophagitis    • Fatty liver    • GERD (gastroesophageal reflux disease)    • Hepatitis C antibody positive in blood 9/26/2019   • IgA deficiency (CMS/HCC)    • ROYCE (obstructive sleep apnea)    • Tubular adenoma of colon 01/2016   • Urticaria        Past Surgical History:   Procedure Laterality Date   • COLONOSCOPY  05/08/2019    repeat 3 years Dr. Mahendra Michelle   • ESOPHAGEAL DILATATION  2018       Family History   Problem Relation Age of Onset   • Cancer Mother         sinus   • Heart  "disease Mother    • Arthritis Father    • Heart disease Father    • Colon polyps Father    • Stroke Father    • Colon cancer Brother    • Diabetes Maternal Aunt        Social History     Socioeconomic History   • Marital status: Single     Spouse name: Not on file   • Number of children: Not on file   • Years of education: Not on file   • Highest education level: Not on file   Tobacco Use   • Smoking status: Never Smoker   • Smokeless tobacco: Never Used   Substance and Sexual Activity   • Alcohol use: No   • Drug use: No   • Sexual activity: Yes     Birth control/protection: Condom           The following portions of the patient's history were reviewed and updated as appropriate: problem list, allergies, current medications, past medical history, past family history, past social history and past surgical history.    Review of Systems    Immunization History   Administered Date(s) Administered   • COVID-19 (PFIZER) 04/01/2021, 04/22/2021   • Flu Vaccine Quad PF >18YRS 12/05/2017, 11/01/2018   • Flulaval/Fluarix/Fluzone Quad 09/15/2020   • Hepatitis A 01/23/2018, 08/15/2018   • Hepatitis B 01/23/2018, 08/15/2018, 02/15/2019   • Pneumococcal Polysaccharide (PPSV23) 02/15/2019   • Tdap 01/01/2010, 06/16/2020   • flucelvax quad pfs =>4 YRS 09/26/2019       Objective   Vitals:    07/20/21 1454   BP: 140/82   Temp: 97.8 °F (36.6 °C)   Weight: 102 kg (225 lb)   Height: 172.7 cm (68\")     Body mass index is 34.21 kg/m².  Physical Exam  Vitals reviewed.   Constitutional:       Appearance: He is well-developed.   HENT:      Head: Normocephalic and atraumatic.   Cardiovascular:      Rate and Rhythm: Normal rate and regular rhythm.      Heart sounds: Normal heart sounds, S1 normal and S2 normal.   Pulmonary:      Effort: Pulmonary effort is normal.      Breath sounds: Normal breath sounds.   Skin:     General: Skin is warm.   Neurological:      Mental Status: He is alert.   Psychiatric:         Behavior: Behavior normal. "         Procedures    Assessment/Plan   Diagnoses and all orders for this visit:    1. Anxiety (Primary)  -     busPIRone (BUSPAR) 10 MG tablet; Take 1 tablet by mouth 3 (Three) Times a Day As Needed (anixety).  Dispense: 45 tablet; Refill: 0    2. Other specified hypothyroidism  -     Synthroid 150 MCG tablet; Take 1 tablet by mouth Daily.  Dispense: 30 tablet; Refill: 11        Trial buspar.         No follow-ups on file.

## 2021-07-21 ENCOUNTER — TELEPHONE (OUTPATIENT)
Dept: INTERNAL MEDICINE | Facility: CLINIC | Age: 56
End: 2021-07-21

## 2021-07-21 NOTE — TELEPHONE ENCOUNTER
PATIENT STATES MEDICATION busPIRone (BUSPAR) 10 MG tablet IS CAUSING SIGNIFICANT DIZZINESS AND FEELS VERY STRANGE AND DOES NOT KNOW IF HE SHOULD DRIVE. PATIENT WANTS TO KNOW IF THIS WILL SUBSIDE AFTER TAKING MEDICATION FOR A WHILE OR THIS IS NORMAL FOR MEDICATION. ALSO WANTS TO KNOW IF SOMETHING DIFFERENT CAN BE PRESCRIBED IF THIS WILL ALWAYS HAPPEN WITH THE MEDICATION.    PLEASE ADVISE ASAP.    CONTACT: [phone] (M)

## 2021-07-21 NOTE — TELEPHONE ENCOUNTER
It can cause dizziness, it will typically resolve in a week or two. I would give it a few more days as I see he just started him on this yesterday.

## 2021-08-03 DIAGNOSIS — F41.9 ANXIETY: ICD-10-CM

## 2021-08-03 RX ORDER — BUSPIRONE HYDROCHLORIDE 10 MG/1
10 TABLET ORAL 3 TIMES DAILY PRN
Qty: 90 TABLET | Refills: 1 | Status: SHIPPED | OUTPATIENT
Start: 2021-08-03 | End: 2021-09-28

## 2021-09-28 DIAGNOSIS — F41.9 ANXIETY: ICD-10-CM

## 2021-09-28 RX ORDER — BUSPIRONE HYDROCHLORIDE 10 MG/1
10 TABLET ORAL 3 TIMES DAILY PRN
Qty: 90 TABLET | Refills: 1 | Status: SHIPPED | OUTPATIENT
Start: 2021-09-28 | End: 2021-12-02

## 2021-10-13 DIAGNOSIS — E78.5 HYPERLIPIDEMIA, UNSPECIFIED HYPERLIPIDEMIA TYPE: ICD-10-CM

## 2021-10-13 RX ORDER — ATORVASTATIN CALCIUM 20 MG/1
TABLET, FILM COATED ORAL
Qty: 30 TABLET | Refills: 5 | Status: SHIPPED | OUTPATIENT
Start: 2021-10-13 | End: 2022-04-11

## 2021-11-04 ENCOUNTER — OFFICE VISIT (OUTPATIENT)
Dept: INTERNAL MEDICINE | Facility: CLINIC | Age: 56
End: 2021-11-04

## 2021-11-04 ENCOUNTER — HOSPITAL ENCOUNTER (OUTPATIENT)
Dept: GENERAL RADIOLOGY | Facility: HOSPITAL | Age: 56
Discharge: HOME | End: 2021-11-04
Admitting: INTERNAL MEDICINE

## 2021-11-04 VITALS
WEIGHT: 221 LBS | TEMPERATURE: 97.8 F | HEIGHT: 68 IN | DIASTOLIC BLOOD PRESSURE: 84 MMHG | BODY MASS INDEX: 33.49 KG/M2 | SYSTOLIC BLOOD PRESSURE: 132 MMHG

## 2021-11-04 DIAGNOSIS — M54.6 THORACIC SPINE PAIN: ICD-10-CM

## 2021-11-04 DIAGNOSIS — E11.9 TYPE 2 DIABETES MELLITUS WITHOUT COMPLICATION, WITHOUT LONG-TERM CURRENT USE OF INSULIN (HCC): Chronic | ICD-10-CM

## 2021-11-04 DIAGNOSIS — E03.8 OTHER SPECIFIED HYPOTHYROIDISM: Chronic | ICD-10-CM

## 2021-11-04 DIAGNOSIS — E78.49 OTHER HYPERLIPIDEMIA: ICD-10-CM

## 2021-11-04 DIAGNOSIS — Z00.00 WELLNESS EXAMINATION: Primary | ICD-10-CM

## 2021-11-04 DIAGNOSIS — I10 ESSENTIAL HYPERTENSION: Chronic | ICD-10-CM

## 2021-11-04 PROCEDURE — 90686 IIV4 VACC NO PRSV 0.5 ML IM: CPT | Performed by: INTERNAL MEDICINE

## 2021-11-04 PROCEDURE — 90471 IMMUNIZATION ADMIN: CPT | Performed by: INTERNAL MEDICINE

## 2021-11-04 PROCEDURE — 72072 X-RAY EXAM THORAC SPINE 3VWS: CPT

## 2021-11-04 PROCEDURE — 99396 PREV VISIT EST AGE 40-64: CPT | Performed by: INTERNAL MEDICINE

## 2021-11-04 RX ORDER — MELOXICAM 7.5 MG/1
7.5 TABLET ORAL DAILY PRN
Qty: 30 TABLET | Refills: 2 | Status: SHIPPED | OUTPATIENT
Start: 2021-11-04 | End: 2022-02-09 | Stop reason: SDUPTHER

## 2021-11-04 RX ORDER — LOSARTAN POTASSIUM 50 MG/1
50 TABLET ORAL DAILY
Qty: 30 TABLET | Refills: 2 | Status: SHIPPED | OUTPATIENT
Start: 2021-11-04 | End: 2022-02-16

## 2021-11-04 NOTE — PROGRESS NOTES
Subjective        Chief Complaint   Patient presents with   • Annual Exam           Beni Kunz is a 56 y.o. male who presents for    Patient Active Problem List   Diagnosis   • Other hyperlipidemia   • Essential hypertension   • Other specified hypothyroidism   • Type 2 diabetes mellitus without complication, without long-term current use of insulin (HCC)   • Alkaline phosphatase elevation       History of Present Illness     He has not been checking his BP. His sugar was 180 this am. He thinks buspar helps his anxiety. He is taking Buspar BID. His father did die earlier this year. He sees Dr. Platt once per year for his prostate exam. He is due for an eye exam. He has mid to upper back pain that is worse with twisting. He tried PT about 2 years ago. It did not help a lot. He denies incontinence.  No Known Allergies    Current Outpatient Medications on File Prior to Visit   Medication Sig Dispense Refill   • atenolol (TENORMIN) 50 MG tablet TAKE 1 TABLET BY MOUTH EVERY DAY 30 tablet 11   • atorvastatin (LIPITOR) 20 MG tablet TAKE 1 TABLET BY MOUTH EVERY DAY 30 tablet 5   • busPIRone (BUSPAR) 10 MG tablet TAKE 1 TABLET BY MOUTH 3 (THREE) TIMES A DAY AS NEEDED (ANIXETY). 90 tablet 1   • cetirizine (zyrTEC) 10 MG tablet Take 10 mg by mouth Daily.     • fluticasone (FLONASE) 50 MCG/ACT nasal spray SPRAY 1-2 SPRAYS INTO EACH NOSTRIL EVERY DAY  11   • omeprazole (priLOSEC) 40 MG capsule TAKE 1 CAPSULE BY MOUTH TWICE A DAY 60 capsule 11   • Synthroid 150 MCG tablet Take 1 tablet by mouth Daily. 30 tablet 11     No current facility-administered medications on file prior to visit.       Past Medical History:   Diagnosis Date   • Allergic rhinitis    • Esophagitis    • Fatty liver    • GERD (gastroesophageal reflux disease)    • Hepatitis C antibody positive in blood 9/26/2019   • IgA deficiency (HCC)    • ROYCE (obstructive sleep apnea)    • Tubular adenoma of colon 01/2016   • Urticaria        Past Surgical History:    Procedure Laterality Date   • COLONOSCOPY  05/08/2019    repeat 3 years Dr. Mahendra Michelle   • ESOPHAGEAL DILATATION  2018       Family History   Problem Relation Age of Onset   • Cancer Mother         sinus   • Heart disease Mother    • Arthritis Father    • Heart disease Father    • Colon polyps Father    • Stroke Father    • Colon cancer Brother    • Diabetes Maternal Aunt        Social History     Socioeconomic History   • Marital status: Single   Tobacco Use   • Smoking status: Never Smoker   • Smokeless tobacco: Never Used   Substance and Sexual Activity   • Alcohol use: No   • Drug use: No   • Sexual activity: Yes     Birth control/protection: Condom           The following portions of the patient's history were reviewed and updated as appropriate: problem list, allergies, current medications, past medical history, past family history, past social history and past surgical history.    Review of Systems   Constitutional: Negative for chills, fever and unexpected weight loss.   HENT: Negative for postnasal drip and sore throat.    Eyes: Negative for blurred vision.   Respiratory: Negative for cough, shortness of breath and wheezing.    Cardiovascular: Negative for chest pain and leg swelling.   Gastrointestinal: Negative for abdominal pain, blood in stool, nausea, vomiting and GERD.   Endocrine: Negative for polyuria.   Genitourinary: Negative for dysuria, frequency and hematuria.   Musculoskeletal: Negative for gait problem.   Skin: Negative for rash.   Allergic/Immunologic: Negative for immunocompromised state.   Neurological: Negative for weakness.   Hematological: Does not bruise/bleed easily.   Psychiatric/Behavioral: Negative for depressed mood. The patient is not nervous/anxious.        Immunization History   Administered Date(s) Administered   • COVID-19 (PFIZER) 04/01/2021, 04/22/2021   • Flu Vaccine Quad PF >18YRS 12/05/2017, 11/01/2018   • FluLaval/Fluarix/Fluzone >6 09/15/2020, 11/04/2021   • Hepatitis  "A 01/23/2018, 08/15/2018   • Hepatitis B 01/23/2018, 08/15/2018, 02/15/2019   • Pneumococcal Polysaccharide (PPSV23) 02/15/2019   • Tdap 01/01/2010, 06/16/2020   • flucelvax quad pfs =>4 YRS 09/26/2019       Objective   Vitals:    11/04/21 1323   BP: 132/84   Temp: 97.8 °F (36.6 °C)   Weight: 100 kg (221 lb)   Height: 172.7 cm (68\")     Body mass index is 33.6 kg/m².  Physical Exam  Vitals reviewed.   Constitutional:       Appearance: He is well-developed.   HENT:      Head: Normocephalic and atraumatic.      Mouth/Throat:      Mouth: Mucous membranes are moist.      Pharynx: Oropharynx is clear.   Eyes:      Extraocular Movements: Extraocular movements intact.      Conjunctiva/sclera: Conjunctivae normal.      Pupils: Pupils are equal, round, and reactive to light.   Neck:      Thyroid: No thyromegaly.      Vascular: No carotid bruit.   Cardiovascular:      Rate and Rhythm: Normal rate and regular rhythm.      Heart sounds: Normal heart sounds. No murmur heard.      Pulmonary:      Effort: Pulmonary effort is normal.      Breath sounds: Normal breath sounds.   Abdominal:      General: There is no distension.      Palpations: Abdomen is soft. There is no mass.      Tenderness: There is no abdominal tenderness. There is no rebound.   Musculoskeletal:      Cervical back: Neck supple.   Feet:      Right foot:      Protective Sensation: 5 sites tested. 5 sites sensed.      Skin integrity: Skin integrity normal.      Left foot:      Protective Sensation: 5 sites tested. 5 sites sensed.      Skin integrity: Skin integrity normal.   Lymphadenopathy:      Cervical: No cervical adenopathy.   Skin:     General: Skin is warm.   Neurological:      Mental Status: He is alert.   Psychiatric:         Behavior: Behavior normal.         Procedures    Assessment/Plan   Diagnoses and all orders for this visit:    1. Wellness examination (Primary)    2. Type 2 diabetes mellitus without complication, without long-term current use of " insulin (HCC)    3. Other specified hypothyroidism    4. Other hyperlipidemia    5. Essential hypertension  -     losartan (Cozaar) 50 MG tablet; Take 1 tablet by mouth Daily.  Dispense: 30 tablet; Refill: 2  -     Basic Metabolic Panel; Future    6. Thoracic spine pain  -     XR Spine Thoracic 3 View; Future  -     meloxicam (Mobic) 7.5 MG tablet; Take 1 tablet by mouth Daily As Needed for Mild Pain .  Dispense: 30 tablet; Refill: 2    Other orders  -     FluLaval/Fluarix/Fluzone >6 Months (3877-8080)               Flu shot today. [facility] COVID booster and Shingrix. Reviewed labs. LDL and a1c are at goal.  Add Losartan for HTN. TSH is at goal.  Return in about 6 weeks (around 12/16/2021) for Lab Before FUP.

## 2021-12-02 DIAGNOSIS — F41.9 ANXIETY: ICD-10-CM

## 2021-12-02 RX ORDER — BUSPIRONE HYDROCHLORIDE 10 MG/1
10 TABLET ORAL 3 TIMES DAILY PRN
Qty: 90 TABLET | Refills: 1 | Status: SHIPPED | OUTPATIENT
Start: 2021-12-02 | End: 2022-01-28

## 2021-12-14 ENCOUNTER — OFFICE VISIT (OUTPATIENT)
Dept: INTERNAL MEDICINE | Facility: CLINIC | Age: 56
End: 2021-12-14

## 2021-12-14 VITALS
TEMPERATURE: 97.8 F | SYSTOLIC BLOOD PRESSURE: 126 MMHG | HEIGHT: 68 IN | DIASTOLIC BLOOD PRESSURE: 80 MMHG | WEIGHT: 231 LBS | BODY MASS INDEX: 35.01 KG/M2

## 2021-12-14 DIAGNOSIS — E03.8 OTHER SPECIFIED HYPOTHYROIDISM: Chronic | ICD-10-CM

## 2021-12-14 DIAGNOSIS — I10 ESSENTIAL HYPERTENSION: Primary | Chronic | ICD-10-CM

## 2021-12-14 DIAGNOSIS — E11.9 TYPE 2 DIABETES MELLITUS WITHOUT COMPLICATION, WITHOUT LONG-TERM CURRENT USE OF INSULIN (HCC): Chronic | ICD-10-CM

## 2021-12-14 PROCEDURE — 99213 OFFICE O/P EST LOW 20 MIN: CPT | Performed by: INTERNAL MEDICINE

## 2021-12-14 NOTE — PROGRESS NOTES
Subjective        Chief Complaint   Patient presents with   • Hypertension           Beni Kunz is a 56 y.o. male who presents for    Patient Active Problem List   Diagnosis   • Other hyperlipidemia   • Essential hypertension   • Other specified hypothyroidism   • Type 2 diabetes mellitus without complication, without long-term current use of insulin (HCC)   • Alkaline phosphatase elevation       History of Present Illness     He has not been checking his BP. He denies chest pain or dyspnea. His back is better with Meloxicam.  No Known Allergies    Current Outpatient Medications on File Prior to Visit   Medication Sig Dispense Refill   • atenolol (TENORMIN) 50 MG tablet TAKE 1 TABLET BY MOUTH EVERY DAY 30 tablet 11   • atorvastatin (LIPITOR) 20 MG tablet TAKE 1 TABLET BY MOUTH EVERY DAY 30 tablet 5   • busPIRone (BUSPAR) 10 MG tablet TAKE 1 TABLET BY MOUTH 3 (THREE) TIMES A DAY AS NEEDED (ANIXETY). 90 tablet 1   • cetirizine (zyrTEC) 10 MG tablet Take 10 mg by mouth Daily.     • fluticasone (FLONASE) 50 MCG/ACT nasal spray SPRAY 1-2 SPRAYS INTO EACH NOSTRIL EVERY DAY  11   • losartan (Cozaar) 50 MG tablet Take 1 tablet by mouth Daily. 30 tablet 2   • meloxicam (Mobic) 7.5 MG tablet Take 1 tablet by mouth Daily As Needed for Mild Pain . 30 tablet 2   • omeprazole (priLOSEC) 40 MG capsule TAKE 1 CAPSULE BY MOUTH TWICE A DAY 60 capsule 11   • Synthroid 150 MCG tablet Take 1 tablet by mouth Daily. 30 tablet 11     No current facility-administered medications on file prior to visit.       Past Medical History:   Diagnosis Date   • Allergic rhinitis    • Esophagitis    • Fatty liver    • GERD (gastroesophageal reflux disease)    • Hepatitis C antibody positive in blood 9/26/2019   • IgA deficiency (HCC)    • ROYCE (obstructive sleep apnea)    • Tubular adenoma of colon 01/2016   • Urticaria        Past Surgical History:   Procedure Laterality Date   • COLONOSCOPY  05/08/2019    repeat 3 years Dr. Mahendra Michelle   • ESOPHAGEAL  "DILATATION  2018       Family History   Problem Relation Age of Onset   • Cancer Mother         sinus   • Heart disease Mother    • Arthritis Father    • Heart disease Father    • Colon polyps Father    • Stroke Father    • Colon cancer Brother    • Diabetes Maternal Aunt        Social History     Socioeconomic History   • Marital status: Single   Tobacco Use   • Smoking status: Never Smoker   • Smokeless tobacco: Never Used   Substance and Sexual Activity   • Alcohol use: No   • Drug use: No   • Sexual activity: Yes     Birth control/protection: Condom           The following portions of the patient's history were reviewed and updated as appropriate: problem list, allergies, current medications, past medical history, past family history, past social history and past surgical history.    Review of Systems    Immunization History   Administered Date(s) Administered   • COVID-19 (PFIZER) 04/01/2021, 04/22/2021, 11/07/2021   • Flu Vaccine Quad PF >18YRS 12/05/2017, 11/01/2018   • FluLaval/Fluarix/Fluzone >6 09/15/2020, 11/04/2021   • Hepatitis A 01/23/2018, 08/15/2018   • Hepatitis B 01/23/2018, 08/15/2018, 02/15/2019   • Pneumococcal Polysaccharide (PPSV23) 02/15/2019   • Tdap 01/01/2010, 06/16/2020   • flucelvax quad pfs =>4 YRS 09/26/2019       Objective   Vitals:    12/14/21 1657   BP: 126/80   Temp: 97.8 °F (36.6 °C)   Weight: 105 kg (231 lb)   Height: 172.7 cm (67.99\")     Body mass index is 35.13 kg/m².  Physical Exam  Vitals reviewed.   Constitutional:       Appearance: He is well-developed.   HENT:      Head: Normocephalic and atraumatic.   Cardiovascular:      Rate and Rhythm: Normal rate and regular rhythm.      Heart sounds: Normal heart sounds, S1 normal and S2 normal.   Pulmonary:      Effort: Pulmonary effort is normal.      Breath sounds: Normal breath sounds.   Skin:     General: Skin is warm.   Neurological:      Mental Status: He is alert.   Psychiatric:         Behavior: Behavior normal. "         Procedures    Assessment/Plan   Diagnoses and all orders for this visit:    1. Essential hypertension (Primary)  -     Basic Metabolic Panel; Future    2. Other specified hypothyroidism  -     TSH; Future    3. Type 2 diabetes mellitus without complication, without long-term current use of insulin (HCC)  -     Hemoglobin A1c; Future             BMP and xray thoracic spine. BP is nl    Return in about 4 months (around 4/14/2022) for Lab Before FUP.

## 2022-01-03 ENCOUNTER — OFFICE VISIT (OUTPATIENT)
Dept: INTERNAL MEDICINE | Facility: CLINIC | Age: 57
End: 2022-01-03

## 2022-01-03 ENCOUNTER — TELEPHONE (OUTPATIENT)
Dept: INTERNAL MEDICINE | Facility: CLINIC | Age: 57
End: 2022-01-03

## 2022-01-03 VITALS — TEMPERATURE: 97.8 F | HEART RATE: 86 BPM | OXYGEN SATURATION: 98 %

## 2022-01-03 DIAGNOSIS — H93.8X9 PRESSURE SENSATION IN EAR, UNSPECIFIED LATERALITY: Primary | ICD-10-CM

## 2022-01-03 DIAGNOSIS — U07.1 COVID-19 VIRUS INFECTION: ICD-10-CM

## 2022-01-03 PROCEDURE — 99213 OFFICE O/P EST LOW 20 MIN: CPT | Performed by: INTERNAL MEDICINE

## 2022-01-03 NOTE — PROGRESS NOTES
Subjective        Chief Complaint   Patient presents with   • Earache           Beni Kunz is a 56 y.o. male who presents for    Patient Active Problem List   Diagnosis   • Other hyperlipidemia   • Essential hypertension   • Other specified hypothyroidism   • Type 2 diabetes mellitus without complication, without long-term current use of insulin (HCC)   • Alkaline phosphatase elevation       History of Present Illness     He tested positive for COVID last Wednesday. He thinks he had a fever with a cough and scratchy throat. He feels better. He has a slight cough. His ears started to pop with pressure this am.   No Known Allergies    Current Outpatient Medications on File Prior to Visit   Medication Sig Dispense Refill   • atenolol (TENORMIN) 50 MG tablet TAKE 1 TABLET BY MOUTH EVERY DAY 30 tablet 11   • atorvastatin (LIPITOR) 20 MG tablet TAKE 1 TABLET BY MOUTH EVERY DAY 30 tablet 5   • busPIRone (BUSPAR) 10 MG tablet TAKE 1 TABLET BY MOUTH 3 (THREE) TIMES A DAY AS NEEDED (ANIXETY). 90 tablet 1   • cetirizine (zyrTEC) 10 MG tablet Take 10 mg by mouth Daily.     • fluticasone (FLONASE) 50 MCG/ACT nasal spray SPRAY 1-2 SPRAYS INTO EACH NOSTRIL EVERY DAY  11   • losartan (Cozaar) 50 MG tablet Take 1 tablet by mouth Daily. 30 tablet 2   • meloxicam (Mobic) 7.5 MG tablet Take 1 tablet by mouth Daily As Needed for Mild Pain . 30 tablet 2   • omeprazole (priLOSEC) 40 MG capsule TAKE 1 CAPSULE BY MOUTH TWICE A DAY 60 capsule 11   • Synthroid 150 MCG tablet Take 1 tablet by mouth Daily. 30 tablet 11     No current facility-administered medications on file prior to visit.       Past Medical History:   Diagnosis Date   • Allergic rhinitis    • COVID-19 12/2021   • Esophagitis    • Fatty liver    • GERD (gastroesophageal reflux disease)    • Hepatitis C antibody positive in blood 9/26/2019   • IgA deficiency (HCC)    • ROYCE (obstructive sleep apnea)    • Tubular adenoma of colon 01/2016   • Urticaria        Past Surgical  History:   Procedure Laterality Date   • COLONOSCOPY  05/08/2019    repeat 3 years Dr. Mahendra Michelle   • ESOPHAGEAL DILATATION  2018       Family History   Problem Relation Age of Onset   • Cancer Mother         sinus   • Heart disease Mother    • Arthritis Father    • Heart disease Father    • Colon polyps Father    • Stroke Father    • Colon cancer Brother    • Diabetes Maternal Aunt        Social History     Socioeconomic History   • Marital status: Single   Tobacco Use   • Smoking status: Never Smoker   • Smokeless tobacco: Never Used   Substance and Sexual Activity   • Alcohol use: No   • Drug use: No   • Sexual activity: Yes     Birth control/protection: Condom           The following portions of the patient's history were reviewed and updated as appropriate: problem list, allergies, current medications, past medical history, past family history, past social history and past surgical history.    Review of Systems    Immunization History   Administered Date(s) Administered   • COVID-19 (PFIZER) 04/01/2021, 04/22/2021, 11/07/2021   • Flu Vaccine Quad PF >18YRS 12/05/2017, 11/01/2018   • FluLaval/Fluarix/Fluzone >6 09/15/2020, 11/04/2021   • Hepatitis A 01/23/2018, 08/15/2018   • Hepatitis B 01/23/2018, 08/15/2018, 02/15/2019   • Pneumococcal Polysaccharide (PPSV23) 02/15/2019   • Tdap 01/01/2010, 06/16/2020   • flucelvax quad pfs =>4 YRS 09/26/2019       Objective   Vitals:    01/03/22 1206   Pulse: 86   Temp: 97.8 °F (36.6 °C)   SpO2: 98%     There is no height or weight on file to calculate BMI.  Physical Exam  HENT:      Right Ear: Tympanic membrane and ear canal normal.      Left Ear: Tympanic membrane and ear canal normal.      Mouth/Throat:      Mouth: Mucous membranes are moist.      Pharynx: Oropharynx is clear.   Neurological:      Mental Status: He is alert.         Procedures    Assessment/Plan   Diagnoses and all orders for this visit:    1. Pressure sensation in ear, unspecified laterality  (Primary)  Comments:  Continue Flonase. Add afrin    2. COVID-19 virus infection  Comments:  Quarantine per his company doctor as they are doing longer than CDC guidelines at this point                 No follow-ups on file.

## 2022-01-03 NOTE — TELEPHONE ENCOUNTER
Patient has tested positive for Covid on 12/28/21 and is on his 6 day of quarantine however patient is experiencing ear ache in both ears with popping and pressure, patient has also had both ears to close up before.  Patient states he has no fever but does have a cough still, please advise?  [phone].

## 2022-01-28 DIAGNOSIS — F41.9 ANXIETY: ICD-10-CM

## 2022-01-28 RX ORDER — BUSPIRONE HYDROCHLORIDE 10 MG/1
TABLET ORAL
Qty: 90 TABLET | Refills: 1 | Status: SHIPPED | OUTPATIENT
Start: 2022-01-28 | End: 2022-03-24

## 2022-02-09 DIAGNOSIS — M54.6 THORACIC SPINE PAIN: ICD-10-CM

## 2022-02-09 RX ORDER — MELOXICAM 7.5 MG/1
7.5 TABLET ORAL DAILY PRN
Qty: 30 TABLET | Refills: 2 | Status: SHIPPED | OUTPATIENT
Start: 2022-02-09 | End: 2022-05-11 | Stop reason: SDUPTHER

## 2022-02-16 DIAGNOSIS — I10 ESSENTIAL HYPERTENSION: Chronic | ICD-10-CM

## 2022-02-16 RX ORDER — LOSARTAN POTASSIUM 50 MG/1
TABLET ORAL
Qty: 30 TABLET | Refills: 2 | Status: SHIPPED | OUTPATIENT
Start: 2022-02-16 | End: 2022-05-16

## 2022-02-23 RX ORDER — OMEPRAZOLE 40 MG/1
CAPSULE, DELAYED RELEASE ORAL
Qty: 60 CAPSULE | Refills: 11 | Status: SHIPPED | OUTPATIENT
Start: 2022-02-23 | End: 2023-02-22

## 2022-03-24 DIAGNOSIS — F41.9 ANXIETY: ICD-10-CM

## 2022-03-24 RX ORDER — BUSPIRONE HYDROCHLORIDE 10 MG/1
TABLET ORAL
Qty: 90 TABLET | Refills: 1 | Status: SHIPPED | OUTPATIENT
Start: 2022-03-24 | End: 2022-09-14 | Stop reason: SDUPTHER

## 2022-03-25 RX ORDER — ATENOLOL 50 MG/1
TABLET ORAL
Qty: 30 TABLET | Refills: 11 | Status: SHIPPED | OUTPATIENT
Start: 2022-03-25 | End: 2022-07-15

## 2022-04-11 DIAGNOSIS — E78.5 HYPERLIPIDEMIA, UNSPECIFIED HYPERLIPIDEMIA TYPE: ICD-10-CM

## 2022-04-11 RX ORDER — ATORVASTATIN CALCIUM 20 MG/1
TABLET, FILM COATED ORAL
Qty: 30 TABLET | Refills: 5 | Status: SHIPPED | OUTPATIENT
Start: 2022-04-11 | End: 2022-10-10

## 2022-04-15 ENCOUNTER — OFFICE VISIT (OUTPATIENT)
Dept: INTERNAL MEDICINE | Facility: CLINIC | Age: 57
End: 2022-04-15

## 2022-04-15 ENCOUNTER — HOSPITAL ENCOUNTER (OUTPATIENT)
Dept: GENERAL RADIOLOGY | Facility: HOSPITAL | Age: 57
Discharge: HOME | End: 2022-04-15
Admitting: INTERNAL MEDICINE

## 2022-04-15 VITALS
TEMPERATURE: 98.2 F | BODY MASS INDEX: 35.61 KG/M2 | WEIGHT: 235 LBS | DIASTOLIC BLOOD PRESSURE: 80 MMHG | HEIGHT: 68 IN | SYSTOLIC BLOOD PRESSURE: 132 MMHG

## 2022-04-15 DIAGNOSIS — M25.511 RIGHT SHOULDER PAIN, UNSPECIFIED CHRONICITY: Primary | ICD-10-CM

## 2022-04-15 DIAGNOSIS — E11.9 TYPE 2 DIABETES MELLITUS WITHOUT COMPLICATION, WITHOUT LONG-TERM CURRENT USE OF INSULIN: Chronic | ICD-10-CM

## 2022-04-15 DIAGNOSIS — I10 ESSENTIAL HYPERTENSION: Primary | Chronic | ICD-10-CM

## 2022-04-15 DIAGNOSIS — M25.511 RIGHT SHOULDER PAIN, UNSPECIFIED CHRONICITY: ICD-10-CM

## 2022-04-15 DIAGNOSIS — E03.8 OTHER SPECIFIED HYPOTHYROIDISM: Chronic | ICD-10-CM

## 2022-04-15 PROCEDURE — 99214 OFFICE O/P EST MOD 30 MIN: CPT | Performed by: INTERNAL MEDICINE

## 2022-04-15 PROCEDURE — 73030 X-RAY EXAM OF SHOULDER: CPT

## 2022-04-15 NOTE — PROGRESS NOTES
Subjective        Chief Complaint   Patient presents with   • Hypertension           Beni Kunz is a 56 y.o. male who presents for    Patient Active Problem List   Diagnosis   • Other hyperlipidemia   • Essential hypertension   • Other specified hypothyroidism   • Type 2 diabetes mellitus without complication, without long-term current use of insulin (HCC)   • Alkaline phosphatase elevation       History of Present Illness     His BP was 130/80 at the drug store. He denies chest pain. His stays stopped up. He checks his sugars and it was 140 yesterday. His allergies do act up in the spring and fall.  He sees the allergist once per year. He is doing well emotionally. He has had right shoulder pain for a few months. He feels like it is pinched.  No Known Allergies    Current Outpatient Medications on File Prior to Visit   Medication Sig Dispense Refill   • atenolol (TENORMIN) 50 MG tablet TAKE 1 TABLET BY MOUTH EVERY DAY 30 tablet 11   • atorvastatin (LIPITOR) 20 MG tablet TAKE 1 TABLET BY MOUTH EVERY DAY 30 tablet 5   • busPIRone (BUSPAR) 10 MG tablet TAKE 1 TABLET BY MOUTH 3 TIMES A DAY AS NEEDED FOR ANXIETY 90 tablet 1   • cetirizine (zyrTEC) 10 MG tablet Take 10 mg by mouth Daily.     • fluticasone (FLONASE) 50 MCG/ACT nasal spray SPRAY 1-2 SPRAYS INTO EACH NOSTRIL EVERY DAY  11   • losartan (COZAAR) 50 MG tablet TAKE 1 TABLET BY MOUTH EVERY DAY 30 tablet 2   • meloxicam (MOBIC) 7.5 MG tablet TAKE 1 TABLET BY MOUTH DAILY AS NEEDED FOR MILD PAIN . 30 tablet 2   • omeprazole (priLOSEC) 40 MG capsule TAKE 1 CAPSULE BY MOUTH TWICE A DAY 60 capsule 11   • Synthroid 150 MCG tablet Take 1 tablet by mouth Daily. 30 tablet 11     No current facility-administered medications on file prior to visit.       Past Medical History:   Diagnosis Date   • Allergic rhinitis    • COVID-19 12/2021   • Esophagitis    • Fatty liver    • GERD (gastroesophageal reflux disease)    • Hepatitis C antibody positive in blood 9/26/2019   •  "IgA deficiency (HCC)    • ROYCE (obstructive sleep apnea)    • Tubular adenoma of colon 01/2016   • Urticaria        Past Surgical History:   Procedure Laterality Date   • COLONOSCOPY  05/08/2019    repeat 3 years Dr. Mahendra Michelle   • ESOPHAGEAL DILATATION  2018       Family History   Problem Relation Age of Onset   • Cancer Mother         sinus   • Heart disease Mother    • Arthritis Father    • Heart disease Father    • Colon polyps Father    • Stroke Father    • Colon cancer Brother    • Diabetes Maternal Aunt        Social History     Socioeconomic History   • Marital status: Single   Tobacco Use   • Smoking status: Never Smoker   • Smokeless tobacco: Never Used   Substance and Sexual Activity   • Alcohol use: No   • Drug use: No   • Sexual activity: Yes     Birth control/protection: Condom           The following portions of the patient's history were reviewed and updated as appropriate: problem list, allergies, current medications, past medical history, past family history, past social history and past surgical history.    Review of Systems    Immunization History   Administered Date(s) Administered   • COVID-19 (PFIZER) PURPLE CAP 04/01/2021, 04/22/2021, 11/07/2021   • FluLaval/Fluarix/Fluzone >6 09/15/2020, 11/04/2021   • Fluzone Split Quad (Multi-dose) 12/05/2017, 11/01/2018   • Hepatitis A 01/23/2018, 08/15/2018   • Hepatitis B 01/23/2018, 08/15/2018, 02/15/2019   • Pneumococcal Polysaccharide (PPSV23) 02/15/2019   • Tdap 01/01/2010, 06/16/2020   • flucelvax quad pfs =>4 YRS 09/26/2019       Objective   Vitals:    04/15/22 1128   BP: 132/80   Temp: 98.2 °F (36.8 °C)   Weight: 107 kg (235 lb)   Height: 172.7 cm (67.99\")     Body mass index is 35.74 kg/m².  Physical Exam  Vitals reviewed.   Constitutional:       Appearance: He is well-developed.   HENT:      Head: Normocephalic and atraumatic.   Cardiovascular:      Rate and Rhythm: Normal rate and regular rhythm.      Heart sounds: Normal heart sounds, S1 normal " and S2 normal.   Pulmonary:      Effort: Pulmonary effort is normal.      Breath sounds: Normal breath sounds.   Musculoskeletal:      Comments: Right shoulder good ROM without crepitus   Skin:     General: Skin is warm.   Neurological:      Mental Status: He is alert.   Psychiatric:         Behavior: Behavior normal.         Procedures    Assessment/Plan   Diagnoses and all orders for this visit:    1. Essential hypertension (Primary)  -     Comprehensive Metabolic Panel; Future    2. Other specified hypothyroidism    3. Type 2 diabetes mellitus without complication, without long-term current use of insulin (HCC)  -     Hemoglobin A1c; Future    4. Right shoulder pain, unspecified chronicity  -     XR Shoulder 2+ View Right; Future               He has a call into Dr. Michelle to get his cscope. Reviewed TSH, BMP and A1c. Discussed losing weight and to eat three meals per day.    Discuss wt loss for bp and sugar. If a1c goes above 7 then will need metformin. TSH is at goal.  Return in about 3 months (around 7/15/2022) for Lab Before FUP.   DISPLAY PLAN FREE TEXT

## 2022-05-11 ENCOUNTER — TREATMENT (OUTPATIENT)
Dept: PHYSICAL THERAPY | Facility: CLINIC | Age: 57
End: 2022-05-11

## 2022-05-11 DIAGNOSIS — M25.511 RIGHT SHOULDER PAIN, UNSPECIFIED CHRONICITY: Primary | ICD-10-CM

## 2022-05-11 DIAGNOSIS — M75.41 SHOULDER IMPINGEMENT SYNDROME, RIGHT: ICD-10-CM

## 2022-05-11 DIAGNOSIS — M54.6 THORACIC SPINE PAIN: ICD-10-CM

## 2022-05-11 PROCEDURE — 97110 THERAPEUTIC EXERCISES: CPT | Performed by: PHYSICAL THERAPIST

## 2022-05-11 PROCEDURE — 97162 PT EVAL MOD COMPLEX 30 MIN: CPT | Performed by: PHYSICAL THERAPIST

## 2022-05-11 PROCEDURE — 97530 THERAPEUTIC ACTIVITIES: CPT | Performed by: PHYSICAL THERAPIST

## 2022-05-11 RX ORDER — MELOXICAM 7.5 MG/1
TABLET ORAL
Qty: 30 TABLET | Refills: 2 | Status: SHIPPED | OUTPATIENT
Start: 2022-05-11 | End: 2023-03-06

## 2022-05-11 NOTE — PROGRESS NOTES
Physical Therapy Initial Evaluation and Plan of Care    Patient: Beni Kunz   : 1965  Diagnosis/ICD-10 Code:  Right shoulder pain, unspecified chronicity [M25.511]  Referring practitioner: Jet Moreno MD  Past Medical History Reviewed: 2022    PLOF: Independent and likes to ride motorcycles    Subjective Evaluation    History of Present Illness  Date of onset: 3/11/2022  Mechanism of injury: The right shoulder just started hurting. It feels like someone is pushing on the outside of the shoulder. It is only a little stiff, it is better with warmer weather. Sleeping on the side is painful. I have not tried heat or ice. I try and stretch it. No neck pain. No numbness or tingling.   It gets stiff riding the motorcycle.  No elbow issues.   The pain usually does not linger too much.       Patient Occupation:  Pain  Current pain ratin  At worst pain ratin  Location: (R) lateral arm  Aggravating factors: overhead activity, outstretched reach and sleeping  Progression: improved    Hand dominance: left             Objective          Postural Observations    Additional Postural Observation Details  Rounded shoulder (B)    Palpation     Additional Palpation Details  No TTP    Cervical/Thoracic Screen   Cervical range of motion within normal limits with the following exceptions: Tightness through cervical and thoracic spine    Neurological Testing     Sensation     Shoulder   Left Shoulder   Intact: light touch    Right Shoulder   Intact: light touch    Active Range of Motion   Left Shoulder   Normal active range of motion    Right Shoulder   Normal active range of motion    Strength/Myotome Testing     Left Shoulder   Normal muscle strength    Right Shoulder     Planes of Motion   Flexion: 5   Abduction: 4   External rotation at 0°: 5   Internal rotation at 0°: 5     Tests     Right Shoulder   Positive Hawkin's, Neer's, painful arc and Speed's.   Negative AC shear, drop arm and  empty can.           Assessment & Plan     Assessment  Impairments: abnormal muscle firing, abnormal or restricted ROM, impaired physical strength, lacks appropriate home exercise program and pain with function  Functional Limitations: carrying objects, lifting, sleeping, pushing, uncomfortable because of pain, reaching behind back, reaching overhead and unable to perform repetitive tasks  Assessment details: Pt presents to PT with symptoms consistent with (R) shoulder impingement resulting in decreased ability to sleep and perform ADL's and recreational activities comfortably.  Pt would benefit from skilled PT intervention to address the deficits noted.   Prognosis: good    Goals  Plan Goals: SHORT TERM GOALS: 3 visits  1. Patient to be compliant with HEP and no diff. with sleeping  2. Increased (R) UE strength to 5/5 with no pain> 2/10 to allow for household and work activities.   3. Pt to report negative impingement tests  4. Pt demonstrates improved posture in sitting and standing with minimal-no cues during treatment session    LONG TERM GOALS: 7 visits  1. Pt score <10% perceived disability on DASH   2. Pt will report no pain at rest or at work  3. Pt will be able to sleep without waking up due to pain  4. Pt able to reach overhead and lift 10# (B) x 10 to allow for return to doing work around home.       Plan  Therapy options: will be seen for skilled therapy services  Planned modality interventions: cryotherapy, electrical stimulation/Russian stimulation, TENS, thermotherapy (hydrocollator packs) and ultrasound  Other planned modality interventions: Dry Needling PRN  Planned therapy interventions: ADL retraining, flexibility, body mechanics training, home exercise program, functional ROM exercises, joint mobilization, manual therapy, neuromuscular re-education, postural training, soft tissue mobilization, spinal/joint mobilization, strengthening, stretching and therapeutic activities  Frequency: 1x  week  Duration in visits: 7  Treatment plan discussed with: patient        Manual Therapy:    -     mins  18422;  Therapeutic Exercise:    10     mins  85457;     Neuromuscular Mc:    -    mins  90069;    Therapeutic Activity:     10     mins  26993;     Gait Training:      -     mins  93799;     Ultrasound:     -     mins  27001;    Electrical Stimulation:    -     mins  56916 ( );  Dry Needling     -     mins self-pay    Timed Treatment:   20   mins   Total Treatment:     45   mins      PT SIGNATURE: ADEN Mario license #: [id number]  DATE TREATMENT INITIATED: 5/11/2022    Initial Certification  Certification Period: 8/9/2022  I certify that the therapy services are furnished while this patient is under my care.  The services outlined above are required by this patient, and will be reviewed every 90 days.     PHYSICIAN: Jet Moreno MD      DATE:     Please sign and return via fax to [phone].. Thank you, [facility].

## 2022-05-16 DIAGNOSIS — I10 ESSENTIAL HYPERTENSION: Chronic | ICD-10-CM

## 2022-05-16 RX ORDER — LOSARTAN POTASSIUM 50 MG/1
TABLET ORAL
Qty: 30 TABLET | Refills: 2 | Status: SHIPPED | OUTPATIENT
Start: 2022-05-16 | End: 2022-07-08

## 2022-05-18 ENCOUNTER — TREATMENT (OUTPATIENT)
Dept: PHYSICAL THERAPY | Facility: CLINIC | Age: 57
End: 2022-05-18

## 2022-05-18 DIAGNOSIS — M25.511 RIGHT SHOULDER PAIN, UNSPECIFIED CHRONICITY: Primary | ICD-10-CM

## 2022-05-18 DIAGNOSIS — M75.41 SHOULDER IMPINGEMENT SYNDROME, RIGHT: ICD-10-CM

## 2022-05-18 PROCEDURE — 97110 THERAPEUTIC EXERCISES: CPT | Performed by: PHYSICAL THERAPIST

## 2022-05-18 NOTE — PROGRESS NOTES
Physical Therapy Daily Progress Note  Visit # 2           Patient: Beni Kunz   : 1965  Diagnosis/ICD-10 Code:  Right shoulder pain, unspecified chronicity [M25.511]  Referring practitioner: Jet Moreno MD  Date of Initial Evaluation:  Type: THERAPY  Noted: 2022      Subjective  Beni Kunz reports:   I felt fine at the evaluation, I had a little trouble with it last night when I rolled over.  I've been able to ride my motorcycle with no issues.    Objective   See Exercise, Manual, and Modality Logs for complete treatment.     Reviewed current HEP, progressed therex program with exercises as noted.  Added CS retraction, thoracic ext in chair to HEP, written instructions issued ([employer] code [id number]).    Assessment/Plan  Tolerated continued progression of therapeutic exercise/therapeutic activity well today, no increased pain reported during or after exercises.  Would continue to benefit from skilled PT progressing with postural structure strengthening, CS and TS mobility.       Progress per Plan of Care and Progress strengthening /stabilization /functional activity           Timed:         Manual Therapy:         mins  96103     Therapeutic Exercise:     30    mins  54154     Neuromuscular Mc:        mins  50946    Therapeutic Activity:          mins  75245     Gait Training:           mins  53670     Ultrasound:          mins  04534    Ionto                                   mins  86931  Self Care                            mins  48738    Un-Timed:  Electrical Stimulation:         mins 76276 ( )  Traction          mins 27797    Timed Treatment:   30   mins   Total Treatment:     34   mins    JACEY Lowry License #[id number]  Physical Therapist Assistant

## 2022-05-25 ENCOUNTER — TREATMENT (OUTPATIENT)
Dept: PHYSICAL THERAPY | Facility: CLINIC | Age: 57
End: 2022-05-25

## 2022-05-25 DIAGNOSIS — M75.41 SHOULDER IMPINGEMENT SYNDROME, RIGHT: ICD-10-CM

## 2022-05-25 DIAGNOSIS — M25.511 RIGHT SHOULDER PAIN, UNSPECIFIED CHRONICITY: Primary | ICD-10-CM

## 2022-05-25 PROCEDURE — 97110 THERAPEUTIC EXERCISES: CPT | Performed by: PHYSICAL THERAPIST

## 2022-05-25 PROCEDURE — 97112 NEUROMUSCULAR REEDUCATION: CPT | Performed by: PHYSICAL THERAPIST

## 2022-05-25 NOTE — PROGRESS NOTES
Physical Therapy Daily Progress Note  Visit: 3    Beni Joaquina reports: I think there has been small improvements in my shoulder    Subjective     Objective   See Exercise, Manual, and Modality Logs for complete treatment.       Assessment & Plan     Assessment    Assessment details: Pt tolerated session well today. Exercises added today to promote shoulder, scapular, and thoracic spine mobility. Prone Is & Ts and sleeper stretch added to HEP today.    Plan  Plan details: Progress per POC.        Manual Therapy:    -     mins  54642;  Therapeutic Exercise:    26     mins  25860;     Neuromuscular Mc:  8   mins  89984;    Therapeutic Activity:     -     mins  01617;     Gait Training:     -     mins  16657;     Ultrasound:     -     mins  99010;    Electrical Stimulation:    -     mins  60376 ( );  Dry Needling     -     mins self-pay    Timed Treatment:   34   mins   Total Treatment:     38   mins    ADEN Mario License #: [id number]    Physical Therapist

## 2022-06-01 ENCOUNTER — TREATMENT (OUTPATIENT)
Dept: PHYSICAL THERAPY | Facility: CLINIC | Age: 57
End: 2022-06-01

## 2022-06-01 DIAGNOSIS — M25.511 RIGHT SHOULDER PAIN, UNSPECIFIED CHRONICITY: Primary | ICD-10-CM

## 2022-06-01 DIAGNOSIS — M75.41 SHOULDER IMPINGEMENT SYNDROME, RIGHT: ICD-10-CM

## 2022-06-01 PROCEDURE — 97110 THERAPEUTIC EXERCISES: CPT | Performed by: PHYSICAL THERAPIST

## 2022-06-01 PROCEDURE — 97530 THERAPEUTIC ACTIVITIES: CPT | Performed by: PHYSICAL THERAPIST

## 2022-06-01 NOTE — PROGRESS NOTES
Physical Therapy Daily Progress Note  Visit: 4    Beni Kunz reports:  My shoulder has been aching, kind of like a toothache.  I haven't been limited with anything I usually do around the house.     Subjective     Objective   See Exercise, Manual, and Modality Logs for complete treatment.     Reviewed current HEP, progressed therex program with exercises as noted.  Added t-band resisted scaption to HEP with yellow band.    Assessment & Plan     Assessment    Assessment details: Pt tolerated session well today, progressing TE and HEP with exercise for improved shoulder strength and stability.     Plan  Plan details: Progress per POC.        Manual Therapy:    -     mins  03449;  Therapeutic Exercise:    30     mins  70442;     Neuromuscular Mc:   -   mins  46277;    Therapeutic Activity:     10     mins  16734;     Gait Training:     -     mins  05201;     Ultrasound:     -     mins  65582;    Electrical Stimulation:    -     mins  64049 ( );  Dry Needling     -     mins self-pay    Timed Treatment:   40   mins   Total Treatment:     42   mins    JACEY Lowry License #[id number]  Physical Therapist Assistant

## 2022-06-08 ENCOUNTER — TREATMENT (OUTPATIENT)
Dept: PHYSICAL THERAPY | Facility: CLINIC | Age: 57
End: 2022-06-08

## 2022-06-08 DIAGNOSIS — M75.41 SHOULDER IMPINGEMENT SYNDROME, RIGHT: ICD-10-CM

## 2022-06-08 DIAGNOSIS — M25.511 RIGHT SHOULDER PAIN, UNSPECIFIED CHRONICITY: Primary | ICD-10-CM

## 2022-06-08 PROCEDURE — 97110 THERAPEUTIC EXERCISES: CPT | Performed by: PHYSICAL THERAPIST

## 2022-06-08 PROCEDURE — 97530 THERAPEUTIC ACTIVITIES: CPT | Performed by: PHYSICAL THERAPIST

## 2022-06-08 NOTE — PROGRESS NOTES
Physical Therapy Daily Progress Note  Visit: 5    Beni Kunz reports:  Still feeling the aching in the shoulder.  I did notice a sharp pain when I was trying to reach something the other day with my (R) arm, but I was still able to complete the motion and lift the item.      Subjective     Objective   See Exercise, Manual, and Modality Logs for complete treatment.   Reviewed current HEP, progressed therex program with exercises as noted.  Pt notes inc anterior shoulder pain with resisted scaption using t-band, DC'd this exercise.        Assessment & Plan     Assessment    Assessment details:   Pt tolerated session well today, keeping resisted exercise below 90 deg elevation of (R) UE.  Was able to tolerate supine PNF patterns without pain or impingement symptoms.       Goals  Plan Goals: SHORT TERM GOALS: 3 visits  1. Patient to be compliant with HEP and no diff. with sleeping  2. Increased (R) UE strength to 5/5 with no pain> 2/10 to allow for household and work activities.   3. Pt to report negative impingement tests  4. Pt demonstrates improved posture in sitting and standing with minimal-no cues during treatment session    LONG TERM GOALS: 7 visits  1. Pt score <10% perceived disability on DASH   2. Pt will report no pain at rest or at work  3. Pt will be able to sleep without waking up due to pain  4. Pt able to reach overhead and lift 10# (B) x 10 to allow for return to doing work around home.     Plan  Plan details: Progress per POC.        Manual Therapy:    -     mins  23278;  Therapeutic Exercise:    30     mins  41868;     Neuromuscular Mc:   -   mins  68044;    Therapeutic Activity:     10     mins  08237;     Gait Training:     -     mins  44068;     Ultrasound:     -     mins  38079;    Electrical Stimulation:    -     mins  24339 ( );  Dry Needling     -     mins self-pay    Timed Treatment:   40   mins   Total Treatment:     50   mins    JACEY Lowry License #[id number]  Physical  Therapist Assistant

## 2022-06-09 DIAGNOSIS — E03.8 OTHER SPECIFIED HYPOTHYROIDISM: Chronic | ICD-10-CM

## 2022-06-09 RX ORDER — LEVOTHYROXINE SODIUM 150 MCG
TABLET ORAL
Qty: 30 TABLET | Refills: 11 | Status: SHIPPED | OUTPATIENT
Start: 2022-06-09

## 2022-06-15 ENCOUNTER — TREATMENT (OUTPATIENT)
Dept: PHYSICAL THERAPY | Facility: CLINIC | Age: 57
End: 2022-06-15

## 2022-06-15 DIAGNOSIS — M25.511 RIGHT SHOULDER PAIN, UNSPECIFIED CHRONICITY: Primary | ICD-10-CM

## 2022-06-15 DIAGNOSIS — M75.41 SHOULDER IMPINGEMENT SYNDROME, RIGHT: ICD-10-CM

## 2022-06-15 PROCEDURE — 97110 THERAPEUTIC EXERCISES: CPT | Performed by: PHYSICAL THERAPIST

## 2022-06-15 PROCEDURE — 97112 NEUROMUSCULAR REEDUCATION: CPT | Performed by: PHYSICAL THERAPIST

## 2022-06-15 NOTE — PROGRESS NOTES
Physical Therapy Daily Progress Note  Visit: 6    Beni Kunz reports: My shoulder isn't bothering me today but it was feeling achy earlier this week. I have been icing it at home and that seems to help.    Subjective     Objective   See Exercise, Manual, and Modality Logs for complete treatment.       Assessment & Plan     Assessment    Assessment details: Switched scaption TB exercise to be performed in supine d/t pain w/ exercise in standing. Pt tolerated session well but still reports pain at end of range shoulder scaption. All exercises today performed in painfree range and pt educated to perform exercises at home w/in painfree range.    Plan  Plan details: Progress exercises as tolerated.        Manual Therapy:    -     mins  14005;  Therapeutic Exercise:    25     mins  39645;     Neuromuscular Mc:    24    mins  41984;    Therapeutic Activity:     -     mins  93156;     Gait Training:      -     mins  67347;     Ultrasound:     -     mins  43697;    Electrical Stimulation:    -     mins  58191 ( );  Dry Needling     -     mins self-pay    Timed Treatment:   49   mins   Total Treatment:     52   mins    Hailey Wachter  Student Physical Therapist     I was present in the PT department guiding the student by approving, concurring and confirming the skilled judgement for all services rendered    ADEN Mario License #: [id number]  Physical Therapist

## 2022-06-22 ENCOUNTER — TREATMENT (OUTPATIENT)
Dept: PHYSICAL THERAPY | Facility: CLINIC | Age: 57
End: 2022-06-22

## 2022-06-22 DIAGNOSIS — M75.41 SHOULDER IMPINGEMENT SYNDROME, RIGHT: ICD-10-CM

## 2022-06-22 DIAGNOSIS — M25.511 RIGHT SHOULDER PAIN, UNSPECIFIED CHRONICITY: Primary | ICD-10-CM

## 2022-06-22 PROCEDURE — 97530 THERAPEUTIC ACTIVITIES: CPT | Performed by: PHYSICAL THERAPIST

## 2022-06-22 PROCEDURE — 97110 THERAPEUTIC EXERCISES: CPT | Performed by: PHYSICAL THERAPIST

## 2022-06-22 NOTE — PROGRESS NOTES
Physical Therapy Daily Progress Note  Visit: 7    Beni Kunz reports:  I have been noticing aching mostly at night, sometimes I will wake up lying on my (R) shoulder and it aches, but I am able to reposition and go back to sleep.  For some reason I tried starting my  with my (R) arm, I usually use my (L).  That really hurt but only for a short while.  I do notice some discomfort and difficulty with reaching out, like into a cabinet.      Subjective     Objective   See Exercise, Manual, and Modality Logs for complete treatment.     Assessment & Plan     Assessment    Assessment details: Tolerated continued progression of therapeutic exercise/activity well today, including addition of shelf lift using ployball resistance and different .  No increased pain reported during or after exercises.  We discussed continued activity modulation to not overuse shoulder with activities around the house and yard.      Plan  Plan details: Progress exercises as tolerated.        Manual Therapy:    -     mins  16016;  Therapeutic Exercise:    25     mins  35579;     Neuromuscular Mc:   -    mins  69226;    Therapeutic Activity:     20     mins  80765;     Gait Training:      -     mins  36103;     Ultrasound:     -     mins  53450;    Electrical Stimulation:    -     mins  53846 ( );  Dry Needling     -     mins self-pay    Timed Treatment:   45   mins   Total Treatment:     50   mins      JACEY Lowry License #[id number]  Physical Therapist Assistant

## 2022-06-27 ENCOUNTER — APPOINTMENT (OUTPATIENT)
Dept: GENERAL RADIOLOGY | Facility: HOSPITAL | Age: 57
End: 2022-06-27

## 2022-06-27 ENCOUNTER — HOSPITAL ENCOUNTER (EMERGENCY)
Facility: HOSPITAL | Age: 57
Discharge: HOME | End: 2022-06-27
Attending: EMERGENCY MEDICINE | Admitting: EMERGENCY MEDICINE

## 2022-06-27 VITALS
OXYGEN SATURATION: 96 % | HEART RATE: 58 BPM | SYSTOLIC BLOOD PRESSURE: 132 MMHG | DIASTOLIC BLOOD PRESSURE: 63 MMHG | RESPIRATION RATE: 15 BRPM | TEMPERATURE: 97.7 F

## 2022-06-27 DIAGNOSIS — R06.02 SHORTNESS OF BREATH: Primary | ICD-10-CM

## 2022-06-27 DIAGNOSIS — R73.9 HYPERGLYCEMIA: ICD-10-CM

## 2022-06-27 DIAGNOSIS — I51.7 CARDIOMEGALY: ICD-10-CM

## 2022-06-27 LAB
ALBUMIN SERPL-MCNC: 4.4 G/DL (ref 3.5–5.2)
ALBUMIN/GLOB SERPL: 1.9 G/DL
ALP SERPL-CCNC: 120 U/L (ref 39–117)
ALT SERPL W P-5'-P-CCNC: 19 U/L (ref 1–41)
ANION GAP SERPL CALCULATED.3IONS-SCNC: 12.2 MMOL/L (ref 5–15)
AST SERPL-CCNC: 15 U/L (ref 1–40)
BASOPHILS # BLD AUTO: 0.03 10*3/MM3 (ref 0–0.2)
BASOPHILS NFR BLD AUTO: 0.5 % (ref 0–1.5)
BILIRUB SERPL-MCNC: 0.5 MG/DL (ref 0–1.2)
BUN SERPL-MCNC: 15 MG/DL (ref 6–20)
BUN/CREAT SERPL: 12.4 (ref 7–25)
CALCIUM SPEC-SCNC: 10.4 MG/DL (ref 8.6–10.5)
CHLORIDE SERPL-SCNC: 106 MMOL/L (ref 98–107)
CO2 SERPL-SCNC: 21.8 MMOL/L (ref 22–29)
CREAT SERPL-MCNC: 1.21 MG/DL (ref 0.76–1.27)
D DIMER PPP FEU-MCNC: <0.27 MCGFEU/ML (ref 0–0.49)
DEPRECATED RDW RBC AUTO: 39.4 FL (ref 37–54)
EGFRCR SERPLBLD CKD-EPI 2021: 69.4 ML/MIN/1.73
EOSINOPHIL # BLD AUTO: 0.15 10*3/MM3 (ref 0–0.4)
EOSINOPHIL NFR BLD AUTO: 2.6 % (ref 0.3–6.2)
ERYTHROCYTE [DISTWIDTH] IN BLOOD BY AUTOMATED COUNT: 13.2 % (ref 12.3–15.4)
GLOBULIN UR ELPH-MCNC: 2.3 GM/DL
GLUCOSE SERPL-MCNC: 185 MG/DL (ref 65–99)
HCT VFR BLD AUTO: 40.8 % (ref 37.5–51)
HGB BLD-MCNC: 14.2 G/DL (ref 13–17.7)
IMM GRANULOCYTES # BLD AUTO: 0.02 10*3/MM3 (ref 0–0.05)
IMM GRANULOCYTES NFR BLD AUTO: 0.3 % (ref 0–0.5)
LYMPHOCYTES # BLD AUTO: 0.69 10*3/MM3 (ref 0.7–3.1)
LYMPHOCYTES NFR BLD AUTO: 12 % (ref 19.6–45.3)
MCH RBC QN AUTO: 28.7 PG (ref 26.6–33)
MCHC RBC AUTO-ENTMCNC: 34.8 G/DL (ref 31.5–35.7)
MCV RBC AUTO: 82.4 FL (ref 79–97)
MONOCYTES # BLD AUTO: 0.29 10*3/MM3 (ref 0.1–0.9)
MONOCYTES NFR BLD AUTO: 5 % (ref 5–12)
NEUTROPHILS NFR BLD AUTO: 4.58 10*3/MM3 (ref 1.7–7)
NEUTROPHILS NFR BLD AUTO: 79.6 % (ref 42.7–76)
NRBC BLD AUTO-RTO: 0 /100 WBC (ref 0–0.2)
NT-PROBNP SERPL-MCNC: 79.1 PG/ML (ref 0–900)
PLATELET # BLD AUTO: 169 10*3/MM3 (ref 140–450)
PMV BLD AUTO: 10.9 FL (ref 6–12)
POTASSIUM SERPL-SCNC: 4 MMOL/L (ref 3.5–5.2)
PROT SERPL-MCNC: 6.7 G/DL (ref 6–8.5)
QT INTERVAL: 361 MS
RBC # BLD AUTO: 4.95 10*6/MM3 (ref 4.14–5.8)
SODIUM SERPL-SCNC: 140 MMOL/L (ref 136–145)
TROPONIN T SERPL-MCNC: <0.01 NG/ML (ref 0–0.03)
WBC NRBC COR # BLD: 5.76 10*3/MM3 (ref 3.4–10.8)

## 2022-06-27 PROCEDURE — 85379 FIBRIN DEGRADATION QUANT: CPT | Performed by: EMERGENCY MEDICINE

## 2022-06-27 PROCEDURE — 84484 ASSAY OF TROPONIN QUANT: CPT | Performed by: EMERGENCY MEDICINE

## 2022-06-27 PROCEDURE — 36415 COLL VENOUS BLD VENIPUNCTURE: CPT

## 2022-06-27 PROCEDURE — 93010 ELECTROCARDIOGRAM REPORT: CPT | Performed by: INTERNAL MEDICINE

## 2022-06-27 PROCEDURE — 99283 EMERGENCY DEPT VISIT LOW MDM: CPT

## 2022-06-27 PROCEDURE — 83880 ASSAY OF NATRIURETIC PEPTIDE: CPT | Performed by: EMERGENCY MEDICINE

## 2022-06-27 PROCEDURE — 80053 COMPREHEN METABOLIC PANEL: CPT | Performed by: EMERGENCY MEDICINE

## 2022-06-27 PROCEDURE — 71045 X-RAY EXAM CHEST 1 VIEW: CPT

## 2022-06-27 PROCEDURE — 85025 COMPLETE CBC W/AUTO DIFF WBC: CPT | Performed by: EMERGENCY MEDICINE

## 2022-06-27 PROCEDURE — 93005 ELECTROCARDIOGRAM TRACING: CPT | Performed by: EMERGENCY MEDICINE

## 2022-06-27 RX ORDER — SODIUM CHLORIDE 0.9 % (FLUSH) 0.9 %
10 SYRINGE (ML) INJECTION AS NEEDED
Status: DISCONTINUED | OUTPATIENT
Start: 2022-06-27 | End: 2022-06-27 | Stop reason: HOSPADM

## 2022-06-29 ENCOUNTER — TELEPHONE (OUTPATIENT)
Dept: PHYSICAL THERAPY | Facility: CLINIC | Age: 57
End: 2022-06-29

## 2022-07-05 ENCOUNTER — OFFICE VISIT (OUTPATIENT)
Dept: CARDIOLOGY | Facility: CLINIC | Age: 57
End: 2022-07-05

## 2022-07-05 VITALS
HEIGHT: 69 IN | BODY MASS INDEX: 33.68 KG/M2 | DIASTOLIC BLOOD PRESSURE: 88 MMHG | SYSTOLIC BLOOD PRESSURE: 150 MMHG | HEART RATE: 73 BPM | WEIGHT: 227.4 LBS

## 2022-07-05 DIAGNOSIS — R06.09 EXERTIONAL DYSPNEA: Primary | ICD-10-CM

## 2022-07-05 PROCEDURE — 99204 OFFICE O/P NEW MOD 45 MIN: CPT | Performed by: INTERNAL MEDICINE

## 2022-07-05 PROCEDURE — 93000 ELECTROCARDIOGRAM COMPLETE: CPT | Performed by: INTERNAL MEDICINE

## 2022-07-05 NOTE — PROGRESS NOTES
[facility]      Patient Name: Beni Kunz  :1965  Age: 57 y.o.  Encounter Provider:  Rickey Sierra Jr, MD      Chief Complaint:   Chief Complaint   Patient presents with   • Shortness of Breath         HPI  Beni Kunz is a 57 y.o. male past medical history of hypertension, dyslipidemia, sleep apnea not currently on CPAP who presents for initial evaluation of shortness of breath.  Patient has been feeling Emeterio he needs to take a deep breath more often and waking up feeling slightly short of air from time to time.  He denies orthopnea or lower extremity edema.  No chest pain with activity.  No palpitations, dizziness or syncope.  He was seen in the emergency room for similar symptoms with benign work-up.  Chest x-ray showed borderline cardiomegaly.  BNP was within normal range.  Patient is a lifelong non-smoker drinks rarely and denies illicit drug use.  Father had a heart attack in his 50s with angioplasty per patient report and  at age 94 of natural causes.      The following portions of the patient's history were reviewed and updated as appropriate: allergies, current medications, past family history, past medical history, past social history, past surgical history and problem list.      Review of Systems   Constitutional: Negative for chills and fever.   HENT: Negative for hoarse voice and sore throat.    Eyes: Negative for double vision and photophobia.   Cardiovascular: Negative for chest pain, leg swelling, near-syncope, orthopnea, palpitations, paroxysmal nocturnal dyspnea and syncope.   Respiratory: Positive for shortness of breath. Negative for cough and wheezing.    Skin: Negative for poor wound healing and rash.   Musculoskeletal: Negative for arthritis and joint swelling.   Gastrointestinal: Negative for bloating, abdominal pain, hematemesis and hematochezia.   Neurological: Negative for dizziness and focal weakness.   Psychiatric/Behavioral: Negative for  "depression and suicidal ideas.       OBJECTIVE:   Vital Signs  Vitals:    07/05/22 1009   BP: 150/88   Pulse: 73     Estimated body mass index is 33.58 kg/m² as calculated from the following:    Height as of this encounter: 175.3 cm (69\").    Weight as of this encounter: 103 kg (227 lb 6.4 oz).    Vitals reviewed.   Constitutional:       Appearance: Healthy appearance. Not in distress.   Neck:      Vascular: No JVR. JVD normal.   Pulmonary:      Effort: Pulmonary effort is normal.      Breath sounds: Normal breath sounds. No wheezing. No rhonchi. No rales.   Chest:      Chest wall: Not tender to palpatation.   Cardiovascular:      PMI at left midclavicular line. Normal rate. Regular rhythm. Normal S1. Normal S2.      Murmurs: There is no murmur.      No gallop. No click. No rub.   Pulses:     Intact distal pulses.   Edema:     Peripheral edema absent.   Abdominal:      General: Bowel sounds are normal.      Palpations: Abdomen is soft.      Tenderness: There is no abdominal tenderness.   Musculoskeletal: Normal range of motion.         General: No tenderness. Skin:     General: Skin is warm and dry.   Neurological:      General: No focal deficit present.      Mental Status: Alert and oriented to person, place and time.           ECG 12 Lead    Date/Time: 7/5/2022 10:42 AM  Performed by: Rickey Sierra Jr., MD  Authorized by: Rickey Sierra Jr., MD   Comparison: compared with previous ECG from 6/27/2022  Comparison to previous ECG: Criteria for low voltage QRS is no longer present on current tracing compared to previous  Rhythm: sinus rhythm    Clinical impression: normal ECG                  ASSESSMENT:     Exertional dyspnea  Hypertension  Dyslipidemia  Borderline diabetes  Family history coronary artery disease    PLAN OF CARE:     1. Exertional dyspnea -questionable PND symptoms with cardiomegaly on chest x-ray which is unreliable.  BNP was normal and symptoms are not conclusive for heart failure.  Check " echocardiogram.  2. Family history coronary artery disease -confirmed disease with his father at a young age.  We discussed screening and he will consider coronary artery calcium score.  Paperwork for [facility] vascular screening was provided.  3. Hypertension -elevated today in clinic but patient states it is usually much better at home.  Twice daily blood pressure log to be sent in after 1 week.  4. Dyslipidemia  5. Borderline diabetes    Return to clinic 3 months             Discharge Medications          Accurate as of July 5, 2022 10:11 AM. If you have any questions, ask your nurse or doctor.            Continue These Medications      Instructions Start Date   atenolol 50 MG tablet  Commonly known as: TENORMIN   TAKE 1 TABLET BY MOUTH EVERY DAY      atorvastatin 20 MG tablet  Commonly known as: LIPITOR   TAKE 1 TABLET BY MOUTH EVERY DAY      busPIRone 10 MG tablet  Commonly known as: BUSPAR   TAKE 1 TABLET BY MOUTH 3 TIMES A DAY AS NEEDED FOR ANXIETY      cetirizine 10 MG tablet  Commonly known as: zyrTEC   10 mg, Oral, Daily      fluticasone 50 MCG/ACT nasal spray  Commonly known as: FLONASE   SPRAY 1-2 SPRAYS INTO EACH NOSTRIL EVERY DAY      losartan 50 MG tablet  Commonly known as: COZAAR   TAKE 1 TABLET BY MOUTH EVERY DAY      meloxicam 7.5 MG tablet  Commonly known as: MOBIC   TAKE 1 TABLET BY MOUTH EVERY DAY AS NEEDED FOR MILD PAIN      omeprazole 40 MG capsule  Commonly known as: priLOSEC   TAKE 1 CAPSULE BY MOUTH TWICE A DAY      Synthroid 150 MCG tablet  Generic drug: levothyroxine   TAKE 1 TABLET BY MOUTH EVERY DAY             Thank you for allowing me to participate in the care of your patient,      Sincerely,   Rickey Sierra MD  [facility]  07/05/22  10:11 EDT

## 2022-07-07 DIAGNOSIS — E11.9 TYPE 2 DIABETES MELLITUS WITHOUT COMPLICATION, WITHOUT LONG-TERM CURRENT USE OF INSULIN: Chronic | ICD-10-CM

## 2022-07-07 DIAGNOSIS — I10 ESSENTIAL HYPERTENSION: Chronic | ICD-10-CM

## 2022-07-08 ENCOUNTER — OFFICE VISIT (OUTPATIENT)
Dept: INTERNAL MEDICINE | Facility: CLINIC | Age: 57
End: 2022-07-08

## 2022-07-08 VITALS
DIASTOLIC BLOOD PRESSURE: 82 MMHG | SYSTOLIC BLOOD PRESSURE: 134 MMHG | WEIGHT: 228 LBS | HEIGHT: 69 IN | BODY MASS INDEX: 33.77 KG/M2 | TEMPERATURE: 97.5 F

## 2022-07-08 DIAGNOSIS — I10 ESSENTIAL HYPERTENSION: Primary | Chronic | ICD-10-CM

## 2022-07-08 DIAGNOSIS — R06.02 SHORTNESS OF BREATH: ICD-10-CM

## 2022-07-08 DIAGNOSIS — G47.33 OSA (OBSTRUCTIVE SLEEP APNEA): ICD-10-CM

## 2022-07-08 PROCEDURE — 99214 OFFICE O/P EST MOD 30 MIN: CPT | Performed by: INTERNAL MEDICINE

## 2022-07-08 RX ORDER — LOSARTAN POTASSIUM 100 MG/1
100 TABLET ORAL DAILY
Qty: 30 TABLET | Refills: 2 | Status: SHIPPED | OUTPATIENT
Start: 2022-07-08 | End: 2022-10-10

## 2022-07-08 NOTE — PROGRESS NOTES
Subjective        Chief Complaint   Patient presents with   • Hospital Follow Up Visit     Went in for SUSSY HUMPHRIES Joaquina is a 57 y.o. male who presents for    Patient Active Problem List   Diagnosis   • Other hyperlipidemia   • Essential hypertension   • Other specified hypothyroidism   • Type 2 diabetes mellitus without complication, without long-term current use of insulin (HCC)   • Alkaline phosphatase elevation       History of Present Illness     He was in the ER about a week ago. He had dyspnea without chest pain. He has seen the cardiologist; he is to have an echo and vascular screen . He has to take a deep breath. He feels like he is off rhythm. He is not wheezing or coughing. He has h/o sleep apnea. He does not wear a mask b/c he felt restricted with it. He exercises 3 times per week.     His BP was 141/73 today and 132/76 yesterday. He does snore. He wakes up rested. He has to sleep on his side or it will wake him up.  No Known Allergies    Current Outpatient Medications on File Prior to Visit   Medication Sig Dispense Refill   • atenolol (TENORMIN) 50 MG tablet TAKE 1 TABLET BY MOUTH EVERY DAY 30 tablet 11   • atorvastatin (LIPITOR) 20 MG tablet TAKE 1 TABLET BY MOUTH EVERY DAY 30 tablet 5   • busPIRone (BUSPAR) 10 MG tablet TAKE 1 TABLET BY MOUTH 3 TIMES A DAY AS NEEDED FOR ANXIETY 90 tablet 1   • cetirizine (zyrTEC) 10 MG tablet Take 10 mg by mouth Daily.     • fluticasone (FLONASE) 50 MCG/ACT nasal spray SPRAY 1-2 SPRAYS INTO EACH NOSTRIL EVERY DAY  11   • meloxicam (MOBIC) 7.5 MG tablet TAKE 1 TABLET BY MOUTH EVERY DAY AS NEEDED FOR MILD PAIN 30 tablet 2   • omeprazole (priLOSEC) 40 MG capsule TAKE 1 CAPSULE BY MOUTH TWICE A DAY 60 capsule 11   • Synthroid 150 MCG tablet TAKE 1 TABLET BY MOUTH EVERY DAY 30 tablet 11   • [DISCONTINUED] losartan (COZAAR) 50 MG tablet TAKE 1 TABLET BY MOUTH EVERY DAY 30 tablet 2     No current facility-administered medications on file prior to visit.       Past  Medical History:   Diagnosis Date   • Allergic rhinitis    • Anxiety    • COVID-19 12/2021   • Diabetes mellitus (HCC)    • Esophagitis    • Fatty liver    • GERD (gastroesophageal reflux disease)    • Hepatitis C antibody positive in blood 09/26/2019   • IgA deficiency (HCC)    • ROYCE (obstructive sleep apnea)    • Tubular adenoma of colon 01/2016   • Urticaria        Past Surgical History:   Procedure Laterality Date   • COLONOSCOPY  05/08/2019    repeat 3 years Dr. Mahendra Michelle   • ESOPHAGEAL DILATATION  2018       Family History   Problem Relation Age of Onset   • Cancer Mother         sinus   • Heart disease Mother    • Arthritis Father    • Heart disease Father    • Colon polyps Father    • Stroke Father    • Hypertension Father    • Heart failure Father    • Colon cancer Brother    • Diabetes Maternal Aunt        Social History     Socioeconomic History   • Marital status: Single   Tobacco Use   • Smoking status: Never Smoker   • Smokeless tobacco: Never Used   Substance and Sexual Activity   • Alcohol use: Yes     Comment: VERY RARELY;  caffeine use- soda- sugar free coke   • Drug use: No   • Sexual activity: Yes     Birth control/protection: Condom           The following portions of the patient's history were reviewed and updated as appropriate: problem list, allergies, current medications, past medical history, past family history, past social history and past surgical history.    Review of Systems    Immunization History   Administered Date(s) Administered   • COVID-19 (PFIZER) PURPLE CAP 04/01/2021, 04/22/2021, 11/07/2021   • FluLaval/Fluarix/Fluzone >6 09/15/2020, 11/04/2021   • Fluzone Split Quad (Multi-dose) 12/05/2017, 11/01/2018   • Hepatitis A 01/23/2018, 08/15/2018   • Hepatitis B 01/23/2018, 08/15/2018, 02/15/2019   • Pneumococcal Polysaccharide (PPSV23) 02/15/2019   • Tdap 01/01/2010, 06/16/2020   • flucelvax quad pfs =>4 YRS 09/26/2019       Objective   Vitals:    07/08/22 0956   BP: 134/82   Temp:  "97.5 °F (36.4 °C)   Weight: 103 kg (228 lb)   Height: 175.3 cm (69\")     Body mass index is 33.67 kg/m².  Physical Exam  Vitals reviewed.   Constitutional:       Appearance: He is well-developed.   HENT:      Head: Normocephalic and atraumatic.   Cardiovascular:      Rate and Rhythm: Normal rate and regular rhythm.      Heart sounds: Normal heart sounds, S1 normal and S2 normal.   Pulmonary:      Effort: Pulmonary effort is normal.      Breath sounds: Normal breath sounds.   Skin:     General: Skin is warm.   Neurological:      Mental Status: He is alert.   Psychiatric:         Behavior: Behavior normal.         Procedures    Assessment & Plan   Diagnoses and all orders for this visit:    1. Essential hypertension (Primary)  -     losartan (Cozaar) 100 MG tablet; Take 1 tablet by mouth Daily.  Dispense: 30 tablet; Refill: 2    2. Shortness of breath    3. ROYCE (obstructive sleep apnea)  -     Ambulatory Referral to Sleep Medicine               Reviewed CXR, labs and cards note. Increase Losartan. I think we re eval his sleep apnea. He is to have an echo and vascular screen. Labs today. He sees me again in a week.  No follow-ups on file.  "

## 2022-07-09 LAB
ALBUMIN SERPL-MCNC: 4.6 G/DL (ref 3.8–4.9)
ALBUMIN/GLOB SERPL: 2.3 {RATIO} (ref 1.2–2.2)
ALP SERPL-CCNC: 116 IU/L (ref 44–121)
ALT SERPL-CCNC: 20 IU/L (ref 0–44)
AST SERPL-CCNC: 18 IU/L (ref 0–40)
BILIRUB SERPL-MCNC: 0.6 MG/DL (ref 0–1.2)
BUN SERPL-MCNC: 10 MG/DL (ref 6–24)
BUN/CREAT SERPL: 8 (ref 9–20)
CALCIUM SERPL-MCNC: 9.6 MG/DL (ref 8.7–10.2)
CHLORIDE SERPL-SCNC: 107 MMOL/L (ref 96–106)
CO2 SERPL-SCNC: 23 MMOL/L (ref 20–29)
CREAT SERPL-MCNC: 1.18 MG/DL (ref 0.76–1.27)
EGFRCR SERPLBLD CKD-EPI 2021: 72 ML/MIN/1.73
GLOBULIN SER CALC-MCNC: 2 G/DL (ref 1.5–4.5)
GLUCOSE SERPL-MCNC: 113 MG/DL (ref 65–99)
HBA1C MFR BLD: 6.1 % (ref 4.8–5.6)
POTASSIUM SERPL-SCNC: 4.7 MMOL/L (ref 3.5–5.2)
PROT SERPL-MCNC: 6.6 G/DL (ref 6–8.5)
SODIUM SERPL-SCNC: 143 MMOL/L (ref 134–144)

## 2022-07-15 ENCOUNTER — OFFICE VISIT (OUTPATIENT)
Dept: INTERNAL MEDICINE | Facility: CLINIC | Age: 57
End: 2022-07-15

## 2022-07-15 VITALS
WEIGHT: 223 LBS | HEART RATE: 66 BPM | HEIGHT: 69 IN | TEMPERATURE: 97.8 F | SYSTOLIC BLOOD PRESSURE: 142 MMHG | DIASTOLIC BLOOD PRESSURE: 86 MMHG | BODY MASS INDEX: 33.03 KG/M2

## 2022-07-15 DIAGNOSIS — R06.00 DYSPNEA, UNSPECIFIED TYPE: ICD-10-CM

## 2022-07-15 DIAGNOSIS — E11.9 TYPE 2 DIABETES MELLITUS WITHOUT COMPLICATION, WITHOUT LONG-TERM CURRENT USE OF INSULIN: Chronic | ICD-10-CM

## 2022-07-15 DIAGNOSIS — I10 ESSENTIAL HYPERTENSION: Primary | Chronic | ICD-10-CM

## 2022-07-15 PROCEDURE — 99214 OFFICE O/P EST MOD 30 MIN: CPT | Performed by: INTERNAL MEDICINE

## 2022-07-15 RX ORDER — ATENOLOL 100 MG/1
100 TABLET ORAL DAILY
Qty: 30 TABLET | Refills: 3 | Status: SHIPPED | OUTPATIENT
Start: 2022-07-15 | End: 2022-11-14

## 2022-07-15 NOTE — PROGRESS NOTES
Subjective        Chief Complaint   Patient presents with   • Hypertension           Beni Kunz is a 57 y.o. male who presents for    Patient Active Problem List   Diagnosis   • Other hyperlipidemia   • Essential hypertension   • Other specified hypothyroidism   • Type 2 diabetes mellitus without complication, without long-term current use of insulin (HCC)   • Alkaline phosphatase elevation       History of Present Illness     His BP was 127/70 this and 132/73 last pm. He feels better. He denies chest pain.  He still takes deep breaths. He is short of breath at rest. He feels better with activity. It happens daily. He is not wheezing. He sees sleep med in Sept. He has not had his echo yet. He exercises three days per week without any difficulty. He denies chest pain. He is not eating out as much.  No Known Allergies    Current Outpatient Medications on File Prior to Visit   Medication Sig Dispense Refill   • atorvastatin (LIPITOR) 20 MG tablet TAKE 1 TABLET BY MOUTH EVERY DAY 30 tablet 5   • busPIRone (BUSPAR) 10 MG tablet TAKE 1 TABLET BY MOUTH 3 TIMES A DAY AS NEEDED FOR ANXIETY 90 tablet 1   • cetirizine (zyrTEC) 10 MG tablet Take 10 mg by mouth Daily.     • fluticasone (FLONASE) 50 MCG/ACT nasal spray SPRAY 1-2 SPRAYS INTO EACH NOSTRIL EVERY DAY  11   • losartan (Cozaar) 100 MG tablet Take 1 tablet by mouth Daily. 30 tablet 2   • meloxicam (MOBIC) 7.5 MG tablet TAKE 1 TABLET BY MOUTH EVERY DAY AS NEEDED FOR MILD PAIN 30 tablet 2   • omeprazole (priLOSEC) 40 MG capsule TAKE 1 CAPSULE BY MOUTH TWICE A DAY 60 capsule 11   • Synthroid 150 MCG tablet TAKE 1 TABLET BY MOUTH EVERY DAY 30 tablet 11   • [DISCONTINUED] atenolol (TENORMIN) 50 MG tablet TAKE 1 TABLET BY MOUTH EVERY DAY 30 tablet 11     No current facility-administered medications on file prior to visit.       Past Medical History:   Diagnosis Date   • Allergic rhinitis    • Anxiety    • COVID-19 12/2021   • Esophagitis    • Fatty liver    • GERD  "(gastroesophageal reflux disease)    • Hepatitis C antibody positive in blood 09/26/2019   • IgA deficiency (HCC)    • ROYCE (obstructive sleep apnea)    • Tubular adenoma of colon 01/2016   • Urticaria        Past Surgical History:   Procedure Laterality Date   • COLONOSCOPY  05/08/2019    repeat 3 years Dr. Mahendra Michelle   • ESOPHAGEAL DILATATION  2018       Family History   Problem Relation Age of Onset   • Cancer Mother         sinus   • Heart disease Mother    • Arthritis Father    • Heart disease Father    • Colon polyps Father    • Stroke Father    • Hypertension Father    • Heart failure Father    • Colon cancer Brother    • Diabetes Maternal Aunt        Social History     Socioeconomic History   • Marital status: Single   Tobacco Use   • Smoking status: Never Smoker   • Smokeless tobacco: Never Used   Substance and Sexual Activity   • Alcohol use: Yes     Comment: VERY RARELY;  caffeine use- soda- sugar free coke   • Drug use: No   • Sexual activity: Yes     Birth control/protection: Condom           The following portions of the patient's history were reviewed and updated as appropriate: problem list, allergies, current medications, past medical history, past family history, past social history and past surgical history.    Review of Systems    Immunization History   Administered Date(s) Administered   • COVID-19 (PFIZER) PURPLE CAP 04/01/2021, 04/22/2021, 11/07/2021   • FluLaval/Fluarix/Fluzone >6 09/15/2020, 11/04/2021   • Fluzone Split Quad (Multi-dose) 12/05/2017, 11/01/2018   • Hepatitis A 01/23/2018, 08/15/2018   • Hepatitis B 01/23/2018, 08/15/2018, 02/15/2019   • Pneumococcal Polysaccharide (PPSV23) 02/15/2019   • Tdap 01/01/2010, 06/16/2020   • flucelvax quad pfs =>4 YRS 09/26/2019       Objective   Vitals:    07/15/22 1132   BP: 142/86   Pulse: 66   Temp: 97.8 °F (36.6 °C)   Weight: 101 kg (223 lb)   Height: 175.3 cm (69.02\")     Body mass index is 32.92 kg/m².  Physical Exam  Vitals reviewed. "   Constitutional:       Appearance: He is well-developed.   HENT:      Head: Normocephalic and atraumatic.   Cardiovascular:      Rate and Rhythm: Normal rate and regular rhythm.      Heart sounds: Normal heart sounds, S1 normal and S2 normal.   Pulmonary:      Effort: Pulmonary effort is normal.      Breath sounds: Normal breath sounds.   Skin:     General: Skin is warm.   Neurological:      Mental Status: He is alert.   Psychiatric:         Behavior: Behavior normal.         Procedures    Assessment & Plan   Diagnoses and all orders for this visit:    1. Essential hypertension (Primary)  -     atenolol (Tenormin) 100 MG tablet; Take 1 tablet by mouth Daily.  Dispense: 30 tablet; Refill: 3    2. Type 2 diabetes mellitus without complication, without long-term current use of insulin (HCC)    3. Dyspnea, unspecified type               Reviewed CMP and a1c. Sugars are better. His dyspnea is at rest. He will get his echo set up. He had similar episodes in the past at our old practice with nl PFTs; I am reassured that it is not affected by actvity.    Increase atenolol.  Return in about 4 weeks (around 8/12/2022).

## 2022-08-11 ENCOUNTER — OFFICE VISIT (OUTPATIENT)
Dept: INTERNAL MEDICINE | Facility: CLINIC | Age: 57
End: 2022-08-11

## 2022-08-11 VITALS
SYSTOLIC BLOOD PRESSURE: 142 MMHG | TEMPERATURE: 97.8 F | HEIGHT: 69 IN | BODY MASS INDEX: 32.88 KG/M2 | DIASTOLIC BLOOD PRESSURE: 92 MMHG | WEIGHT: 222 LBS

## 2022-08-11 DIAGNOSIS — I10 ESSENTIAL HYPERTENSION: Primary | Chronic | ICD-10-CM

## 2022-08-11 PROCEDURE — 99214 OFFICE O/P EST MOD 30 MIN: CPT | Performed by: INTERNAL MEDICINE

## 2022-08-11 RX ORDER — HYDROCHLOROTHIAZIDE 12.5 MG/1
12.5 TABLET ORAL DAILY
Qty: 30 TABLET | Refills: 3 | Status: SHIPPED | OUTPATIENT
Start: 2022-08-11 | End: 2022-12-07

## 2022-08-11 NOTE — PROGRESS NOTES
Subjective        Chief Complaint   Patient presents with   • Hypertension           Beni Kunz is a 57 y.o. male who presents for    Patient Active Problem List   Diagnosis   • Other hyperlipidemia   • Essential hypertension   • Other specified hypothyroidism   • Type 2 diabetes mellitus without complication, without long-term current use of insulin (HCC)   • Alkaline phosphatase elevation       History of Present Illness     His BP was 128/79. He denies chest pain. He is exercising 3 days per week.  No Known Allergies    Current Outpatient Medications on File Prior to Visit   Medication Sig Dispense Refill   • atenolol (Tenormin) 100 MG tablet Take 1 tablet by mouth Daily. 30 tablet 3   • atorvastatin (LIPITOR) 20 MG tablet TAKE 1 TABLET BY MOUTH EVERY DAY 30 tablet 5   • busPIRone (BUSPAR) 10 MG tablet TAKE 1 TABLET BY MOUTH 3 TIMES A DAY AS NEEDED FOR ANXIETY 90 tablet 1   • cetirizine (zyrTEC) 10 MG tablet Take 10 mg by mouth Daily.     • fluticasone (FLONASE) 50 MCG/ACT nasal spray SPRAY 1-2 SPRAYS INTO EACH NOSTRIL EVERY DAY  11   • losartan (Cozaar) 100 MG tablet Take 1 tablet by mouth Daily. 30 tablet 2   • meloxicam (MOBIC) 7.5 MG tablet TAKE 1 TABLET BY MOUTH EVERY DAY AS NEEDED FOR MILD PAIN 30 tablet 2   • omeprazole (priLOSEC) 40 MG capsule TAKE 1 CAPSULE BY MOUTH TWICE A DAY 60 capsule 11   • Synthroid 150 MCG tablet TAKE 1 TABLET BY MOUTH EVERY DAY 30 tablet 11     No current facility-administered medications on file prior to visit.       Past Medical History:   Diagnosis Date   • Allergic rhinitis    • Anxiety    • COVID-19 12/2021   • Esophagitis    • Fatty liver    • GERD (gastroesophageal reflux disease)    • Hepatitis C antibody positive in blood 09/26/2019   • IgA deficiency (HCC)    • ROYCE (obstructive sleep apnea)    • Tubular adenoma of colon 01/2016   • Urticaria        Past Surgical History:   Procedure Laterality Date   • COLONOSCOPY  05/08/2019    repeat 3 years Dr. Mahendra Michelle   •  "ESOPHAGEAL DILATATION  2018       Family History   Problem Relation Age of Onset   • Cancer Mother         sinus   • Heart disease Mother    • Arthritis Father    • Heart disease Father    • Colon polyps Father    • Stroke Father    • Hypertension Father    • Heart failure Father    • Colon cancer Brother    • Diabetes Maternal Aunt        Social History     Socioeconomic History   • Marital status: Single   Tobacco Use   • Smoking status: Never Smoker   • Smokeless tobacco: Never Used   Substance and Sexual Activity   • Alcohol use: Yes     Comment: VERY RARELY;  caffeine use- soda- sugar free coke   • Drug use: No   • Sexual activity: Yes     Birth control/protection: Condom           The following portions of the patient's history were reviewed and updated as appropriate: problem list, allergies, current medications, past medical history, past family history, past social history and past surgical history.    Review of Systems    Immunization History   Administered Date(s) Administered   • COVID-19 (PFIZER) PURPLE CAP 04/01/2021, 04/22/2021, 11/07/2021   • FluLaval/Fluarix/Fluzone >6 09/15/2020, 11/04/2021   • Fluzone Split Quad (Multi-dose) 12/05/2017, 11/01/2018   • Hepatitis A 01/23/2018, 08/15/2018   • Hepatitis B 01/23/2018, 08/15/2018, 02/15/2019   • Pneumococcal Polysaccharide (PPSV23) 02/15/2019   • Tdap 01/01/2010, 06/16/2020   • flucelvax quad pfs =>4 YRS 09/26/2019       Objective   Vitals:    08/11/22 1440   BP: 142/92   Temp: 97.8 °F (36.6 °C)   Weight: 101 kg (222 lb)   Height: 175.3 cm (69.02\")     Body mass index is 32.77 kg/m².  Physical Exam  Vitals reviewed.   Constitutional:       Appearance: He is well-developed.   HENT:      Head: Normocephalic and atraumatic.   Cardiovascular:      Rate and Rhythm: Normal rate and regular rhythm.      Heart sounds: Normal heart sounds, S1 normal and S2 normal.   Pulmonary:      Effort: Pulmonary effort is normal.      Breath sounds: Normal breath sounds. "   Skin:     General: Skin is warm.   Neurological:      Mental Status: He is alert.   Psychiatric:         Behavior: Behavior normal.         Procedures    Assessment & Plan   Diagnoses and all orders for this visit:    1. Essential hypertension (Primary)  -     hydroCHLOROthiazide (HYDRODIURIL) 12.5 MG tablet; Take 1 tablet by mouth Daily.  Dispense: 30 tablet; Refill: 3  -     Basic Metabolic Panel             He has an echo and sleep eval set up.  He will call Dr. Michelle about his cscope. Add HCTZ; recheck K in a week.    Return in about 4 weeks (around 9/8/2022).

## 2022-08-18 DIAGNOSIS — I10 ESSENTIAL HYPERTENSION: Primary | ICD-10-CM

## 2022-08-18 LAB
BUN SERPL-MCNC: 22 MG/DL (ref 6–24)
BUN/CREAT SERPL: 16 (ref 9–20)
CALCIUM SERPL-MCNC: 10.1 MG/DL (ref 8.7–10.2)
CHLORIDE SERPL-SCNC: 103 MMOL/L (ref 96–106)
CO2 SERPL-SCNC: 23 MMOL/L (ref 20–29)
CREAT SERPL-MCNC: 1.36 MG/DL (ref 0.76–1.27)
EGFRCR-CYS SERPLBLD CKD-EPI 2021: 60 ML/MIN/1.73
GLUCOSE SERPL-MCNC: 130 MG/DL (ref 65–99)
POTASSIUM SERPL-SCNC: 4.6 MMOL/L (ref 3.5–5.2)
SODIUM SERPL-SCNC: 144 MMOL/L (ref 134–144)

## 2022-08-30 VITALS
HEART RATE: 61 BPM | OXYGEN SATURATION: 96 % | OXYGEN SATURATION: 99 % | DIASTOLIC BLOOD PRESSURE: 44 MMHG | SYSTOLIC BLOOD PRESSURE: 90 MMHG | WEIGHT: 223 LBS | DIASTOLIC BLOOD PRESSURE: 64 MMHG | HEART RATE: 71 BPM | DIASTOLIC BLOOD PRESSURE: 41 MMHG | RESPIRATION RATE: 15 BRPM | HEART RATE: 65 BPM | HEIGHT: 68 IN | TEMPERATURE: 98.5 F | OXYGEN SATURATION: 97 % | RESPIRATION RATE: 14 BRPM | DIASTOLIC BLOOD PRESSURE: 68 MMHG | HEART RATE: 78 BPM | SYSTOLIC BLOOD PRESSURE: 98 MMHG | SYSTOLIC BLOOD PRESSURE: 92 MMHG | DIASTOLIC BLOOD PRESSURE: 57 MMHG | HEART RATE: 85 BPM | DIASTOLIC BLOOD PRESSURE: 47 MMHG | DIASTOLIC BLOOD PRESSURE: 69 MMHG | RESPIRATION RATE: 12 BRPM | HEART RATE: 70 BPM | HEART RATE: 69 BPM | RESPIRATION RATE: 18 BRPM | DIASTOLIC BLOOD PRESSURE: 86 MMHG | OXYGEN SATURATION: 98 % | SYSTOLIC BLOOD PRESSURE: 95 MMHG | RESPIRATION RATE: 13 BRPM | DIASTOLIC BLOOD PRESSURE: 51 MMHG | SYSTOLIC BLOOD PRESSURE: 87 MMHG | SYSTOLIC BLOOD PRESSURE: 91 MMHG | SYSTOLIC BLOOD PRESSURE: 82 MMHG | HEART RATE: 74 BPM | HEART RATE: 67 BPM | SYSTOLIC BLOOD PRESSURE: 138 MMHG | SYSTOLIC BLOOD PRESSURE: 133 MMHG | TEMPERATURE: 98.2 F | OXYGEN SATURATION: 100 % | RESPIRATION RATE: 16 BRPM | HEART RATE: 77 BPM

## 2022-09-02 ENCOUNTER — AMBULATORY SURGICAL CENTER (AMBULATORY)
Dept: URBAN - METROPOLITAN AREA SURGERY 17 | Facility: SURGERY | Age: 57
End: 2022-09-02
Payer: COMMERCIAL

## 2022-09-02 ENCOUNTER — OFFICE (AMBULATORY)
Dept: URBAN - METROPOLITAN AREA PATHOLOGY 4 | Facility: PATHOLOGY | Age: 57
End: 2022-09-02
Payer: COMMERCIAL

## 2022-09-02 DIAGNOSIS — D12.0 BENIGN NEOPLASM OF CECUM: ICD-10-CM

## 2022-09-02 DIAGNOSIS — Z86.010 PERSONAL HISTORY OF COLONIC POLYPS: ICD-10-CM

## 2022-09-02 DIAGNOSIS — K63.5 POLYP OF COLON: ICD-10-CM

## 2022-09-02 DIAGNOSIS — Z80.0 FAMILY HISTORY OF MALIGNANT NEOPLASM OF DIGESTIVE ORGANS: ICD-10-CM

## 2022-09-02 LAB
GI HISTOLOGY: A. ILEOCECAL VALVE: (no result)
GI HISTOLOGY: PDF REPORT: (no result)

## 2022-09-02 PROCEDURE — 45385 COLONOSCOPY W/LESION REMOVAL: CPT | Mod: 33 | Performed by: INTERNAL MEDICINE

## 2022-09-02 PROCEDURE — 88305 TISSUE EXAM BY PATHOLOGIST: CPT | Performed by: INTERNAL MEDICINE

## 2022-09-02 NOTE — SERVICEHPINOTES
57-year-old gentleman without GI complaints.  He does however have a family history of colon cancer as well as a personal history of adenomatous polyps.  He presents for a follow-up colonoscopy.

## 2022-09-06 ENCOUNTER — HOSPITAL ENCOUNTER (OUTPATIENT)
Dept: CARDIOLOGY | Facility: HOSPITAL | Age: 57
Discharge: HOME | End: 2022-09-06
Admitting: INTERNAL MEDICINE

## 2022-09-06 VITALS
HEIGHT: 69 IN | SYSTOLIC BLOOD PRESSURE: 140 MMHG | WEIGHT: 222 LBS | DIASTOLIC BLOOD PRESSURE: 100 MMHG | OXYGEN SATURATION: 97 % | HEART RATE: 75 BPM | BODY MASS INDEX: 32.88 KG/M2

## 2022-09-06 DIAGNOSIS — R06.09 EXERTIONAL DYSPNEA: ICD-10-CM

## 2022-09-06 LAB
AORTIC ARCH: 2.8 CM
ASCENDING AORTA: 2.5 CM
BH CV ECHO MEAS - ACS: 1.77 CM
BH CV ECHO MEAS - AI P1/2T: 862 MSEC
BH CV ECHO MEAS - AO MAX PG: 7.2 MMHG
BH CV ECHO MEAS - AO MEAN PG: 4.5 MMHG
BH CV ECHO MEAS - AO ROOT DIAM: 3.3 CM
BH CV ECHO MEAS - AO V2 MAX: 134.4 CM/SEC
BH CV ECHO MEAS - AO V2 VTI: 29.9 CM
BH CV ECHO MEAS - AVA(I,D): 2.12 CM2
BH CV ECHO MEAS - EDV(CUBED): 53.4 ML
BH CV ECHO MEAS - EDV(MOD-SP2): 97 ML
BH CV ECHO MEAS - EDV(MOD-SP4): 79 ML
BH CV ECHO MEAS - EF(MOD-BP): 60 %
BH CV ECHO MEAS - EF(MOD-SP2): 54.6 %
BH CV ECHO MEAS - EF(MOD-SP4): 65.8 %
BH CV ECHO MEAS - ESV(CUBED): 14.7 ML
BH CV ECHO MEAS - ESV(MOD-SP2): 44 ML
BH CV ECHO MEAS - ESV(MOD-SP4): 27 ML
BH CV ECHO MEAS - FS: 35 %
BH CV ECHO MEAS - IVS/LVPW: 1.04 CM
BH CV ECHO MEAS - IVSD: 0.94 CM
BH CV ECHO MEAS - LAT PEAK E' VEL: 10.7 CM/SEC
BH CV ECHO MEAS - LV DIASTOLIC VOL/BSA (35-75): 36.6 CM2
BH CV ECHO MEAS - LV MASS(C)D: 103.6 GRAMS
BH CV ECHO MEAS - LV MAX PG: 5.2 MMHG
BH CV ECHO MEAS - LV MEAN PG: 3.2 MMHG
BH CV ECHO MEAS - LV SYSTOLIC VOL/BSA (12-30): 12.5 CM2
BH CV ECHO MEAS - LV V1 MAX: 114.3 CM/SEC
BH CV ECHO MEAS - LV V1 VTI: 24.4 CM
BH CV ECHO MEAS - LVIDD: 3.8 CM
BH CV ECHO MEAS - LVIDS: 2.45 CM
BH CV ECHO MEAS - LVOT AREA: 2.6 CM2
BH CV ECHO MEAS - LVOT DIAM: 1.82 CM
BH CV ECHO MEAS - LVPWD: 0.91 CM
BH CV ECHO MEAS - MED PEAK E' VEL: 7.7 CM/SEC
BH CV ECHO MEAS - MV A DUR: 0.11 SEC
BH CV ECHO MEAS - MV A MAX VEL: 106.7 CM/SEC
BH CV ECHO MEAS - MV DEC SLOPE: 376 CM/SEC2
BH CV ECHO MEAS - MV DEC TIME: 0.16 MSEC
BH CV ECHO MEAS - MV E MAX VEL: 95.2 CM/SEC
BH CV ECHO MEAS - MV E/A: 0.89
BH CV ECHO MEAS - MV MAX PG: 5.2 MMHG
BH CV ECHO MEAS - MV MEAN PG: 3.1 MMHG
BH CV ECHO MEAS - MV P1/2T: 85.6 MSEC
BH CV ECHO MEAS - MV V2 VTI: 41.9 CM
BH CV ECHO MEAS - MVA(P1/2T): 2.6 CM2
BH CV ECHO MEAS - MVA(VTI): 1.52 CM2
BH CV ECHO MEAS - PA ACC TIME: 0.1 SEC
BH CV ECHO MEAS - PA PR(ACCEL): 34.6 MMHG
BH CV ECHO MEAS - PA V2 MAX: 100.4 CM/SEC
BH CV ECHO MEAS - PULM A REVS DUR: 0.13 SEC
BH CV ECHO MEAS - PULM A REVS VEL: 27.4 CM/SEC
BH CV ECHO MEAS - PULM DIAS VEL: 30 CM/SEC
BH CV ECHO MEAS - PULM S/D: 1.24
BH CV ECHO MEAS - PULM SYS VEL: 37.3 CM/SEC
BH CV ECHO MEAS - QP/QS: 0.67
BH CV ECHO MEAS - RAP SYSTOLE: 3 MMHG
BH CV ECHO MEAS - RV MAX PG: 1.98 MMHG
BH CV ECHO MEAS - RV V1 MAX: 70.3 CM/SEC
BH CV ECHO MEAS - RV V1 VTI: 17.5 CM
BH CV ECHO MEAS - RVOT DIAM: 1.75 CM
BH CV ECHO MEAS - SI(MOD-SP2): 24.5 ML/M2
BH CV ECHO MEAS - SI(MOD-SP4): 24.1 ML/M2
BH CV ECHO MEAS - SV(LVOT): 63.5 ML
BH CV ECHO MEAS - SV(MOD-SP2): 53 ML
BH CV ECHO MEAS - SV(MOD-SP4): 52 ML
BH CV ECHO MEAS - SV(RVOT): 42.4 ML
BH CV ECHO MEAS - TAPSE (>1.6): 2.49 CM
BH CV ECHO MEASUREMENTS AVERAGE E/E' RATIO: 10.35
BH CV VAS BP RIGHT ARM: NORMAL MMHG
BH CV XLRA - RV BASE: 3.1 CM
BH CV XLRA - RV LENGTH: 7.9 CM
BH CV XLRA - RV MID: 2.8 CM
BH CV XLRA - TDI S': 11.6 CM/SEC
LEFT ATRIUM VOLUME INDEX: 13.6 ML/M2
MAXIMAL PREDICTED HEART RATE: 162 BPM
SINUS: 2.6 CM
STJ: 2.6 CM
STRESS TARGET HR: 138 BPM

## 2022-09-06 PROCEDURE — 93306 TTE W/DOPPLER COMPLETE: CPT | Performed by: INTERNAL MEDICINE

## 2022-09-06 PROCEDURE — 93306 TTE W/DOPPLER COMPLETE: CPT

## 2022-09-06 PROCEDURE — 25010000002 PERFLUTREN (DEFINITY) 8.476 MG IN SODIUM CHLORIDE (PF) 0.9 % 10 ML INJECTION: Performed by: INTERNAL MEDICINE

## 2022-09-06 RX ADMIN — PERFLUTREN 1.5 ML: 6.52 INJECTION, SUSPENSION INTRAVENOUS at 16:02

## 2022-09-07 NOTE — PROGRESS NOTES
Please let patient know this is a normal echocardiogram.  I will see him at next scheduled follow-up visit.

## 2022-09-14 DIAGNOSIS — F41.9 ANXIETY: ICD-10-CM

## 2022-09-14 RX ORDER — BUSPIRONE HYDROCHLORIDE 10 MG/1
TABLET ORAL
Qty: 90 TABLET | Refills: 1 | Status: SHIPPED | OUTPATIENT
Start: 2022-09-14 | End: 2022-11-14

## 2022-09-19 ENCOUNTER — OFFICE VISIT (OUTPATIENT)
Dept: INTERNAL MEDICINE | Facility: CLINIC | Age: 57
End: 2022-09-19

## 2022-09-19 VITALS
TEMPERATURE: 97.8 F | DIASTOLIC BLOOD PRESSURE: 80 MMHG | HEIGHT: 69 IN | SYSTOLIC BLOOD PRESSURE: 122 MMHG | BODY MASS INDEX: 33.47 KG/M2 | WEIGHT: 226 LBS

## 2022-09-19 DIAGNOSIS — D12.6 TUBULAR ADENOMA OF COLON: Primary | ICD-10-CM

## 2022-09-19 DIAGNOSIS — I10 ESSENTIAL HYPERTENSION: Chronic | ICD-10-CM

## 2022-09-19 DIAGNOSIS — E11.9 TYPE 2 DIABETES MELLITUS WITHOUT COMPLICATION, WITHOUT LONG-TERM CURRENT USE OF INSULIN: Chronic | ICD-10-CM

## 2022-09-19 DIAGNOSIS — E03.8 OTHER SPECIFIED HYPOTHYROIDISM: Chronic | ICD-10-CM

## 2022-09-19 DIAGNOSIS — R06.89 SIGHING RESPIRATION: ICD-10-CM

## 2022-09-19 DIAGNOSIS — E78.49 OTHER HYPERLIPIDEMIA: ICD-10-CM

## 2022-09-19 DIAGNOSIS — M25.521 RIGHT ELBOW PAIN: ICD-10-CM

## 2022-09-19 PROCEDURE — 90471 IMMUNIZATION ADMIN: CPT | Performed by: INTERNAL MEDICINE

## 2022-09-19 PROCEDURE — 90686 IIV4 VACC NO PRSV 0.5 ML IM: CPT | Performed by: INTERNAL MEDICINE

## 2022-09-19 PROCEDURE — 99214 OFFICE O/P EST MOD 30 MIN: CPT | Performed by: INTERNAL MEDICINE

## 2022-09-19 NOTE — PROGRESS NOTES
Subjective        Chief Complaint   Patient presents with   • Hypertension           Beni Kunz is a 57 y.o. male who presents for    Patient Active Problem List   Diagnosis   • Other hyperlipidemia   • Essential hypertension   • Other specified hypothyroidism   • Type 2 diabetes mellitus without complication, without long-term current use of insulin (HCC)   • Alkaline phosphatase elevation   • Tubular adenoma of colon       History of Present Illness     He still feels like he has to take a deep breath. He denies chest pain. He is not exercising b/c of work. His BP has been 140/60.His right lateral elbow has been hurting with flexion or extension for 2 weeks after he painted his house. He denies injury. He has taken some APAP which helps. He denies wheeze or cough.  No Known Allergies    Current Outpatient Medications on File Prior to Visit   Medication Sig Dispense Refill   • atenolol (Tenormin) 100 MG tablet Take 1 tablet by mouth Daily. 30 tablet 3   • atorvastatin (LIPITOR) 20 MG tablet TAKE 1 TABLET BY MOUTH EVERY DAY 30 tablet 5   • busPIRone (BUSPAR) 10 MG tablet TAKE 1 TABLET BY MOUTH THREE TIMES A DAY AS NEEDED FOR ANXIETY 90 tablet 1   • cetirizine (zyrTEC) 10 MG tablet Take 10 mg by mouth Daily.     • fluticasone (FLONASE) 50 MCG/ACT nasal spray SPRAY 1-2 SPRAYS INTO EACH NOSTRIL EVERY DAY  11   • hydroCHLOROthiazide (HYDRODIURIL) 12.5 MG tablet Take 1 tablet by mouth Daily. 30 tablet 3   • losartan (Cozaar) 100 MG tablet Take 1 tablet by mouth Daily. 30 tablet 2   • meloxicam (MOBIC) 7.5 MG tablet TAKE 1 TABLET BY MOUTH EVERY DAY AS NEEDED FOR MILD PAIN 30 tablet 2   • omeprazole (priLOSEC) 40 MG capsule TAKE 1 CAPSULE BY MOUTH TWICE A DAY 60 capsule 11   • Synthroid 150 MCG tablet TAKE 1 TABLET BY MOUTH EVERY DAY 30 tablet 11     No current facility-administered medications on file prior to visit.       Past Medical History:   Diagnosis Date   • Allergic rhinitis    • Anxiety    • COVID-19 12/2021    • Esophagitis    • Fatty liver    • GERD (gastroesophageal reflux disease)    • Hepatitis C antibody positive in blood 09/26/2019   • IgA deficiency (HCC)    • ROYCE (obstructive sleep apnea)    • Tubular adenoma of colon 01/2016   • Urticaria        Past Surgical History:   Procedure Laterality Date   • COLONOSCOPY  05/08/2019    repeat 3 years Dr. Mahendra Michelle   • COLONOSCOPY  09/02/2022    repeat in 5 years with Dr. Mahendra Michelle   • ESOPHAGEAL DILATATION  2018       Family History   Problem Relation Age of Onset   • Cancer Mother         sinus   • Heart disease Mother    • Arthritis Father    • Heart disease Father    • Colon polyps Father    • Stroke Father    • Hypertension Father    • Heart failure Father    • Colon cancer Brother    • Diabetes Maternal Aunt        Social History     Socioeconomic History   • Marital status: Single   Tobacco Use   • Smoking status: Never Smoker   • Smokeless tobacco: Never Used   Substance and Sexual Activity   • Alcohol use: Yes     Comment: VERY RARELY;  caffeine use- soda- sugar free coke   • Drug use: No   • Sexual activity: Yes     Birth control/protection: Condom           The following portions of the patient's history were reviewed and updated as appropriate: problem list, allergies, current medications, past medical history, past family history, past social history and past surgical history.    Review of Systems    Immunization History   Administered Date(s) Administered   • COVID-19 (PFIZER) PURPLE CAP 04/01/2021, 04/22/2021, 11/07/2021   • FluLaval/Fluzone >6mos 09/15/2020, 11/04/2021   • Fluzone Quad >6mos (Multi-dose) 12/05/2017, 11/01/2018   • Hepatitis A 01/23/2018, 08/15/2018   • Hepatitis B 01/23/2018, 08/15/2018, 02/15/2019   • Pneumococcal Polysaccharide (PPSV23) 02/15/2019   • Tdap 01/01/2010, 06/16/2020   • flucelvax quad pfs =>4 YRS 09/26/2019       Objective   Vitals:    09/19/22 0947   BP: 122/80   Temp: 97.8 °F (36.6 °C)   Weight: 103 kg (226 lb)   Height:  "175.3 cm (69\")     Body mass index is 33.37 kg/m².  Physical Exam  Vitals reviewed.   Constitutional:       Appearance: He is well-developed.   HENT:      Head: Normocephalic and atraumatic.   Cardiovascular:      Rate and Rhythm: Normal rate and regular rhythm.      Heart sounds: Normal heart sounds, S1 normal and S2 normal.   Pulmonary:      Effort: Pulmonary effort is normal.      Breath sounds: Normal breath sounds.   Musculoskeletal:      Comments: Right elbow FROM and non tender   Skin:     General: Skin is warm.   Neurological:      Mental Status: He is alert.   Psychiatric:         Behavior: Behavior normal.         Procedures    Assessment & Plan   Diagnoses and all orders for this visit:    1. Tubular adenoma of colon (Primary)  Comments:  Repeat cscope in 5 years    2. Right elbow pain  Comments:  Ice and stretches since occurred after painting house    3. Sighing respiration  Comments:  Offered PFTs; let's see how sleep study turns out.    4. Type 2 diabetes mellitus without complication, without long-term current use of insulin (HCC)  -     Hemoglobin A1c; Future  -     Microalbumin / Creatinine Urine Ratio - Urine, Clean Catch; Future    5. Other specified hypothyroidism  -     TSH; Future    6. Other hyperlipidemia  -     Lipid Panel With / Chol / HDL Ratio; Future    7. Essential hypertension  -     Comprehensive Metabolic Panel; Future    Other orders  -     FluLaval/Fluzone >6 mos ([id number])               He has a sleep consultation this week. Reviewed nl stress echo. Reviewed bmp. His BP monitor is not accurate. Recc get Omron. BP is good in the office.  Return in about 5 months (around 2/19/2023) for Annual physical, Lab Before FUP.  "

## 2022-09-22 ENCOUNTER — OFFICE VISIT (OUTPATIENT)
Dept: SLEEP MEDICINE | Facility: HOSPITAL | Age: 57
End: 2022-09-22

## 2022-09-22 VITALS
BODY MASS INDEX: 33.95 KG/M2 | DIASTOLIC BLOOD PRESSURE: 77 MMHG | OXYGEN SATURATION: 99 % | HEART RATE: 70 BPM | SYSTOLIC BLOOD PRESSURE: 134 MMHG | WEIGHT: 229.2 LBS | HEIGHT: 69 IN

## 2022-09-22 DIAGNOSIS — G47.8 NON-RESTORATIVE SLEEP: ICD-10-CM

## 2022-09-22 DIAGNOSIS — G47.30 OBSERVED SLEEP APNEA: Primary | ICD-10-CM

## 2022-09-22 DIAGNOSIS — R06.83 SNORING: ICD-10-CM

## 2022-09-22 DIAGNOSIS — E66.9 CLASS 1 OBESITY: ICD-10-CM

## 2022-09-22 PROBLEM — E66.811 CLASS 1 OBESITY: Status: ACTIVE | Noted: 2022-09-22

## 2022-09-22 PROCEDURE — G0463 HOSPITAL OUTPT CLINIC VISIT: HCPCS

## 2022-09-22 PROCEDURE — 99204 OFFICE O/P NEW MOD 45 MIN: CPT | Performed by: INTERNAL MEDICINE

## 2022-09-22 NOTE — PROGRESS NOTES
[facility]  [address]  [place] [address]  Phone   Fax       Beni Kunz  [id number]   1965  57 y.o.  male      PCP:Jet Moreno MD    Type of service: Initial New Patient Office Visit  Date of service: 9/22/2022    Chief Complaint   Patient presents with   • Witnessed Apnea   • Snoring   • Non-restorative Sleep   • Fatigue   • Dry Mouth   • Obesity       History of present illness;  Beni Kunz 57 y.o.  is a new patient for me and was seen today for sleep related problems of snoring, non-restorative sleep and witnessed apneas. The symptoms are present for few years and they are persistent in nature.  The snoring is present in all positions and it is loud.  Has  history of prior sleep evaluation and sleep studies more than 10 years ago but unfortunately did not use the CPAP.  His symptoms are getting worse now.. Patient has no prior surgery namely tonsillectomy, nasal surgery and UPPP.     Works second shift from 11:30 AM to 10 PM works at [employer] office.    Patient gives the following sleep history.  Sleep schedule:  Bedtime: 12 midnight  Wake time: 9 AM  Normally takes about 10 minutes to fall asleep  Average hours of sleep 6  Number of naps per day none  Symptoms  In addition to snoring, nonrestorative sleep and witnessed apneas patient gives the following associated symptoms.  Have you ever awakened gasping for breath, coughing, choking: Yes   Change in weight,  No   Morning headaches  No   Awaken with a sore throat or dry mouth  Yes   Leg jerking at night:  No   Crawly feeling/urge sensation to move in the legs: No   Teeth grinding:No   Have you ever awakened at night with a sour taste or burning sensation in your chest:  No   Do you have muscle weakness with laughing or anger or sleep paralysis:  No   Have you ever felt paralyzed while going to sleep or waking up:  No   Sleepwalking, nightmares, No   Nocturia (urination at night): 2  "times per night  Memory Problem:No     Past medical history: (Relevant to sleep medicine)  1. Hypertension  2. Hypothyroidism  3. Diabetes mellitus diet-controlled  4. GERD  5. Depression    • Medications are reviewed by me and documented in the encounter  • Allergies reviewed and documented in encounter    Social history:  Do you drive a commercial vehicle:  No   Shift work:  No   Tobacco use:  No   Alcohol use:  0 per week  Caffeinated drinks: 4    FAMILY HISTORY (Your mother, father, brothers and sisters)  1. Sleep apnea, father  2. Stroke  3. Thyroid disorder  4. Hypertension    REVIEW OF SYSTEMS.  Full review of systems available on the intake form which is scanned in the media tab.  The relevant positive are noted below  1. Daytime excessive sleepiness with Madeline Sleepiness Scale :Total score: 4   2. Snoring  3. Fatigue  4. Nasal congestion      Physical exam:  Vitals:    09/22/22 1300   BP: 134/77   Pulse: 70   SpO2: 99%   Weight: 104 kg (229 lb 3.2 oz)   Height: 175.3 cm (69\")    Body mass index is 33.85 kg/m². Neck Circumference: 18.75 inches  HEENT: Head is atraumatic, normocephalic  Eyes: pupils are round equal and reacting to light and accommodation, conjunctiva normal  Nose: no nasal septal defects or deviation and the nasal passages are clear, no nasal polyps,  Throat: tonsils are not enlarged, tongue normal, oral airway Mallampati class 4  NECK:Neck Circumference: 18.75 inches, trachea is in the midline, thyroid not enlarged  RESPIRATORY SYSTEM: Breath sounds are equal on both sides, there are no wheezes   CARDIOVASULAR SYSTEM: Heart sounds are regular rhythm and reyna rate, no edema  EXTREMITES: No cyanosis, clubbing  NEUROLOGICAL SYSTEM: Oriented x 3, no gross motor defects, gait normal    Office notes from care team reviewed. Office note dated August 11, 2022,reviewed  Labs reviewed.  TSH Results:  TSH    TSH 10/22/21 4/8/22   TSH 2.330 3.500            Most Recent A1C    HGBA1C Most Recent " 7/8/22   Hemoglobin A1C 6.1 (A)   (A) Abnormal value       Comments are available for some flowsheets but are not being displayed.              Assessment and plan:  · Witnessed apnea (R06.81) patient's symptoms and examination is consistent with sleep apnea (G47.30)  I have talked to the patient about the signs and symptoms of sleep apnea. In addition, I have also discussed pathophysiology of sleep apnea.  I also discussed the complications of untreated sleep apnea including effects on hypertension, diabetes mellitus and nonrestorative sleep with hypersomnia which can increase risk for motor vehicle accidents.  Untreated sleep apnea is also a risk factor for development of atrial fibrillation, pulmonary hypertension and stroke.  Discussed in detail of various testing methods including home-based and lab based sleep studies.  Based on history and physical examination the most appropriate study is home sleep test.  The order for the sleep study is placed in [facility].  The test will be scheduled after approval from insurance. Treatment and management will be discussed after the test is completed.  Patient was given opportunity to ask questions and all the questions were answered.   · Snoring (R06.83) snoring is the sound created by turbulent airflow vibrating upper airway soft tissue.  I have also discussed factors affecting snoring including sleep deprivation, sleeping on the back and alcohol ingestion. To minimize snoring, patient is advised to have adequate sleep, sleep on the side and avoid alcohol and sedative medications before bedtime.  · Obesity 1, with BMI Body mass index is 33.85 kg/m².. I have discussed the relationship between weight and sleep apnea.There is direct correlation between weight and severity of sleep apnea.  Weight reduction is encouraged, as it is going to reduce the severity of sleep apnea. I have also discussed with the patient diet and exercise to achieve ideal body weight  · Hypertension    Essential hypertension is highly correlated with sleep apnea. Treating sleep apnea will assist in good blood pressure control.    I have also discussed with the patient the following  • Sleep hygiene: Maintaining a regular bedtime and wake time, not to watch television or work in bed, limit caffeine-containing beverages before bed time and avoid naps during the day  • Adequate amount of sleep.  Generally most people needs about 7 to 8 hours of sleep.  • Return for 31 to 90 days after PAP setup with down load..  Patient's questions were answered.        Tawana Gamez MD  Sleep Medicine.  Medical Director, [facility] sleep centers  9/22/2022 ,

## 2022-10-07 DIAGNOSIS — E78.5 HYPERLIPIDEMIA, UNSPECIFIED HYPERLIPIDEMIA TYPE: ICD-10-CM

## 2022-10-07 DIAGNOSIS — I10 ESSENTIAL HYPERTENSION: Chronic | ICD-10-CM

## 2022-10-10 RX ORDER — LOSARTAN POTASSIUM 100 MG/1
TABLET ORAL
Qty: 30 TABLET | Refills: 5 | Status: SHIPPED | OUTPATIENT
Start: 2022-10-10 | End: 2023-03-06

## 2022-10-10 RX ORDER — ATORVASTATIN CALCIUM 20 MG/1
TABLET, FILM COATED ORAL
Qty: 30 TABLET | Refills: 5 | Status: SHIPPED | OUTPATIENT
Start: 2022-10-10

## 2022-10-28 ENCOUNTER — HOSPITAL ENCOUNTER (OUTPATIENT)
Dept: SLEEP MEDICINE | Facility: HOSPITAL | Age: 57
Discharge: HOME | End: 2022-10-28
Admitting: INTERNAL MEDICINE

## 2022-10-28 DIAGNOSIS — G47.8 NON-RESTORATIVE SLEEP: ICD-10-CM

## 2022-10-28 DIAGNOSIS — R06.83 SNORING: ICD-10-CM

## 2022-10-28 DIAGNOSIS — E66.9 CLASS 1 OBESITY: ICD-10-CM

## 2022-10-28 DIAGNOSIS — G47.30 OBSERVED SLEEP APNEA: ICD-10-CM

## 2022-10-28 PROCEDURE — 95806 SLEEP STUDY UNATT&RESP EFFT: CPT | Performed by: INTERNAL MEDICINE

## 2022-10-28 PROCEDURE — 95806 SLEEP STUDY UNATT&RESP EFFT: CPT

## 2022-11-04 ENCOUNTER — OFFICE VISIT (OUTPATIENT)
Dept: CARDIOLOGY | Facility: CLINIC | Age: 57
End: 2022-11-04

## 2022-11-04 VITALS
HEIGHT: 69 IN | DIASTOLIC BLOOD PRESSURE: 92 MMHG | HEART RATE: 74 BPM | WEIGHT: 228.2 LBS | BODY MASS INDEX: 33.8 KG/M2 | SYSTOLIC BLOOD PRESSURE: 132 MMHG

## 2022-11-04 DIAGNOSIS — R06.09 EXERTIONAL DYSPNEA: Primary | ICD-10-CM

## 2022-11-04 DIAGNOSIS — I10 PRIMARY HYPERTENSION: ICD-10-CM

## 2022-11-04 PROCEDURE — 99213 OFFICE O/P EST LOW 20 MIN: CPT | Performed by: INTERNAL MEDICINE

## 2022-11-04 NOTE — PROGRESS NOTES
[facility]      Patient Name: Beni Kunz  :1965  Age: 57 y.o.  Encounter Provider:  Rickey Sierra Jr, MD      Chief Complaint: Follow-up dyspnea      HPI  Beni Kunz is a 57 y.o. male past medical history of hypertension, dyslipidemia, sleep apnea not currently on CPAP who presents for follow-up evaluation of shortness of breath.      Last clinic visit note: Patient has been feeling like he needs to take a deep breath more often and waking up feeling slightly short of air from time to time.  He denies orthopnea or lower extremity edema.  No chest pain with activity.  No palpitations, dizziness or syncope.  He was seen in the emergency room for similar symptoms with benign work-up.  Chest x-ray showed borderline cardiomegaly.  BNP was within normal range.  Patient is a lifelong non-smoker drinks rarely and denies illicit drug use.  Father had a heart attack in his 50s with angioplasty per patient report and  at age 94 of natural causes.    Echocardiogram performed showing normal ejection fraction no significant valvular heart disease.  Given his father's history of premature coronary artery disease screening with coronary calcium scoring was discussed but the patient decided not to have this done.  On blood pressure monitoring he was noted to have significantly elevated pressures after our visit.  His blood pressure has been much better controlled after the addition of diuretic by Dr. Moreno.  Exertional dyspnea has resolved.  No angina.  No cardiac complaints at time of interview.    The following portions of the patient's history were reviewed and updated as appropriate: allergies, current medications, past family history, past medical history, past social history, past surgical history and problem list.      Review of Systems   Constitutional: Negative for chills and fever.   HENT: Negative for hoarse voice and sore throat.    Eyes: Negative for double vision and  "photophobia.   Cardiovascular: Negative for chest pain, leg swelling, near-syncope, orthopnea, palpitations, paroxysmal nocturnal dyspnea and syncope.   Respiratory: Positive for shortness of breath. Negative for cough and wheezing.    Skin: Negative for poor wound healing and rash.   Musculoskeletal: Negative for arthritis and joint swelling.   Gastrointestinal: Negative for bloating, abdominal pain, hematemesis and hematochezia.   Neurological: Negative for dizziness and focal weakness.   Psychiatric/Behavioral: Negative for depression and suicidal ideas.       OBJECTIVE:   Vital Signs  Vitals:    11/04/22 1254   BP: 132/92   Pulse: 74     Estimated body mass index is 33.7 kg/m² as calculated from the following:    Height as of this encounter: 175.3 cm (69\").    Weight as of this encounter: 104 kg (228 lb 3.2 oz).    Vitals reviewed.   Constitutional:       Appearance: Healthy appearance. Not in distress.   Neck:      Vascular: No JVR. JVD normal.   Pulmonary:      Effort: Pulmonary effort is normal.      Breath sounds: Normal breath sounds. No wheezing. No rhonchi. No rales.   Chest:      Chest wall: Not tender to palpatation.   Cardiovascular:      PMI at left midclavicular line. Normal rate. Regular rhythm. Normal S1. Normal S2.      Murmurs: There is no murmur.      No gallop. No click. No rub.   Pulses:     Intact distal pulses.   Edema:     Peripheral edema absent.   Abdominal:      General: Bowel sounds are normal.      Palpations: Abdomen is soft.      Tenderness: There is no abdominal tenderness.   Musculoskeletal: Normal range of motion.         General: No tenderness. Skin:     General: Skin is warm and dry.   Neurological:      General: No focal deficit present.      Mental Status: Alert and oriented to person, place and time.         Procedures          ASSESSMENT:     Exertional dyspnea  Hypertension  Dyslipidemia  Borderline diabetes  Family history coronary artery disease    PLAN OF CARE: "     1. Exertional dyspnea -normal echo, normal BNP and resolution of symptoms after better control of blood pressure.  2. Family history coronary artery disease -confirmed disease with his father at a young age.  We discussed screening and he will consider coronary artery calcium score.  Paperwork for [facility] vascular screening was provided.  3. Hypertension -diastolic blood pressure is slightly elevated today in clinic but home log is much improved.  Continue sodium restricted diet and twice daily blood pressure log.  4. Dyslipidemia  5. Borderline diabetes    Cardiovascular issues are stable.  I will see the patient as needed.  Please call with any questions or concerns.             Discharge Medications          Accurate as of November 4, 2022 12:57 PM. If you have any questions, ask your nurse or doctor.            Continue These Medications      Instructions Start Date   atenolol 100 MG tablet  Commonly known as: Tenormin   100 mg, Oral, Daily      atorvastatin 20 MG tablet  Commonly known as: LIPITOR   TAKE 1 TABLET BY MOUTH EVERY DAY      busPIRone 10 MG tablet  Commonly known as: BUSPAR   TAKE 1 TABLET BY MOUTH THREE TIMES A DAY AS NEEDED FOR ANXIETY      cetirizine 10 MG tablet  Commonly known as: zyrTEC   10 mg, Oral, Daily      fluticasone 50 MCG/ACT nasal spray  Commonly known as: FLONASE   SPRAY 1-2 SPRAYS INTO EACH NOSTRIL EVERY DAY      hydroCHLOROthiazide 12.5 MG tablet  Commonly known as: HYDRODIURIL   12.5 mg, Oral, Daily      losartan 100 MG tablet  Commonly known as: COZAAR   TAKE 1 TABLET BY MOUTH EVERY DAY      meloxicam 7.5 MG tablet  Commonly known as: MOBIC   TAKE 1 TABLET BY MOUTH EVERY DAY AS NEEDED FOR MILD PAIN      omeprazole 40 MG capsule  Commonly known as: priLOSEC   TAKE 1 CAPSULE BY MOUTH TWICE A DAY      Synthroid 150 MCG tablet  Generic drug: levothyroxine   TAKE 1 TABLET BY MOUTH EVERY DAY             Thank you for allowing me to participate in the care of your  patient,      Sincerely,   Rickey Sierra MD  [facility]  11/04/22  12:57 EDT

## 2022-11-10 DIAGNOSIS — R06.83 SNORING: ICD-10-CM

## 2022-11-10 DIAGNOSIS — G47.33 OSA (OBSTRUCTIVE SLEEP APNEA): Primary | ICD-10-CM

## 2022-11-12 DIAGNOSIS — F41.9 ANXIETY: ICD-10-CM

## 2022-11-12 DIAGNOSIS — I10 ESSENTIAL HYPERTENSION: Chronic | ICD-10-CM

## 2022-11-14 ENCOUNTER — TELEPHONE (OUTPATIENT)
Dept: SLEEP MEDICINE | Facility: HOSPITAL | Age: 57
End: 2022-11-14

## 2022-11-14 RX ORDER — BUSPIRONE HYDROCHLORIDE 10 MG/1
TABLET ORAL
Qty: 90 TABLET | Refills: 1 | Status: SHIPPED | OUTPATIENT
Start: 2022-11-14 | End: 2023-01-06

## 2022-11-14 RX ORDER — ATENOLOL 100 MG/1
TABLET ORAL
Qty: 30 TABLET | Refills: 3 | Status: SHIPPED | OUTPATIENT
Start: 2022-11-14 | End: 2023-03-06

## 2022-11-14 NOTE — TELEPHONE ENCOUNTER
Spoke with patient about sleep study results , sending orders to [facility], will schedule follow up once set up

## 2022-12-07 DIAGNOSIS — I10 ESSENTIAL HYPERTENSION: Chronic | ICD-10-CM

## 2022-12-07 RX ORDER — HYDROCHLOROTHIAZIDE 12.5 MG/1
TABLET ORAL
Qty: 30 TABLET | Refills: 3 | Status: SHIPPED | OUTPATIENT
Start: 2022-12-07

## 2023-01-06 DIAGNOSIS — F41.9 ANXIETY: ICD-10-CM

## 2023-01-06 RX ORDER — BUSPIRONE HYDROCHLORIDE 10 MG/1
TABLET ORAL
Qty: 90 TABLET | Refills: 1 | Status: SHIPPED | OUTPATIENT
Start: 2023-01-06

## 2023-01-19 ENCOUNTER — OFFICE VISIT (OUTPATIENT)
Dept: SLEEP MEDICINE | Facility: HOSPITAL | Age: 58
End: 2023-01-19
Payer: COMMERCIAL

## 2023-01-19 VITALS
WEIGHT: 230.6 LBS | HEIGHT: 69 IN | OXYGEN SATURATION: 96 % | BODY MASS INDEX: 34.16 KG/M2 | DIASTOLIC BLOOD PRESSURE: 69 MMHG | SYSTOLIC BLOOD PRESSURE: 121 MMHG | HEART RATE: 77 BPM

## 2023-01-19 DIAGNOSIS — E66.9 CLASS 1 OBESITY: ICD-10-CM

## 2023-01-19 DIAGNOSIS — G47.33 OSA ON CPAP: Primary | ICD-10-CM

## 2023-01-19 DIAGNOSIS — Z99.89 OSA ON CPAP: Primary | ICD-10-CM

## 2023-01-19 DIAGNOSIS — G47.8 NON-RESTORATIVE SLEEP: ICD-10-CM

## 2023-01-19 PROBLEM — R06.83 SNORING: Status: RESOLVED | Noted: 2022-09-22 | Resolved: 2023-01-19

## 2023-01-19 PROCEDURE — 99213 OFFICE O/P EST LOW 20 MIN: CPT | Performed by: INTERNAL MEDICINE

## 2023-01-19 PROCEDURE — G0463 HOSPITAL OUTPT CLINIC VISIT: HCPCS

## 2023-01-19 NOTE — PROGRESS NOTES
"  [facility]  [address]  [address]  [place] [address]  Phone   Fax       SLEEP CLINIC FOLLOW UP PROGRESS NOTE.    Beni Kunz  [id number]   1965  57 y.o.  male      PCP: Jet Moreno MD      Date of visit: 1/19/2023    Chief Complaint   Patient presents with   • Sleep Apnea   • Obesity       HPI:  This is a 57 y.o. years old patient is here for the management of obstructive sleep apnea.  Sleep apnea is moderate in severity with a AHI of 15/hr. Patient is using positive airway pressure therapy with auto CPAP.  Unfortunately has not been able to use the CPAP because when he puts it on he feels the pressure is not sufficient and he marlin.  Moreover he works for [employer] and it is a very busy time as they are preparing W-2's.    Medications and allergies are reviewed by me and documented in the encounter.     SOCIAL (habits pertaining to sleep medicine)  • History tobacco use:No   • History of alcohol use: 0 per week  • Caffeine use: 4     REVIEW OF SYSTEMS:   Pertaining positive symptoms are:  • Carman Sleepiness Scale :Total score: 2   • Nasal congestion      PHYSICAL EXAMINATION:  CONSTITUTIONAL:  Vitals:    01/19/23 0900   BP: 121/69   Pulse: 77   SpO2: 96%   Weight: 105 kg (230 lb 9.6 oz)   Height: 175.3 cm (69\")    Body mass index is 34.05 kg/m².   NOSE: nasal passages are clear, No deformities noted   RESP SYSTEM: Not in any respiratory distress, no chest deformities noted,   CARDIOVASULAR: No edema noted  NEURO: Oriented x 3, gait normal,  Mood and affect appeared appropriate      Data reviewed:  The Smart card downloaded on 1/19/2023 has been reviewed independently by me for compliance and discussed the data with the patient.   Poor compliance  Mask type: Nasal  Device: ResMed  DME: [facility]  I have increased the starting pressure for a ramp to 8 cm with a ramp time of 15 minutes      ASSESSMENT AND PLAN:  · Obstructive sleep apnea ( G 47.33).  " Patient compliance is poor I have adjusted the pressure there should improve his feeling of shortness of breath.  I will see him again in 3 months for follow-up.  I also talked to the patient about desensitization of mask with wearing the mask when he is watching television..Without proper control of sleep apnea and good compliance there is a increased risk for hypertension, diabetes mellitus and nonrestorative sleep with hypersomnia which can increase risk for motor vehicle accidents.  Untreated sleep apnea is also a risk factor for development of atrial fibrillation, pulmonary hypertension, insulin resistance and stroke.   · Obesity  1 with BMI is Body mass index is 34.05 kg/m².. I have discuss the relationship between the weight and sleep apnea. The benefit of weight loss in reducing severity of sleep apnea was discussed. Discussed diet and exercise with the patient to achieve ideal BMI.   · Return in about 3 months (around 4/19/2023) for Recheck with smart card down load. . Patient's questions were answered.    1/19/2023  Tawana Gamez MD  Sleep Medicine.  Medical Director,   [facility], [facility] sleep centers.

## 2023-02-22 RX ORDER — OMEPRAZOLE 40 MG/1
CAPSULE, DELAYED RELEASE ORAL
Qty: 60 CAPSULE | Refills: 11 | Status: SHIPPED | OUTPATIENT
Start: 2023-02-22

## 2023-03-06 ENCOUNTER — OFFICE VISIT (OUTPATIENT)
Dept: INTERNAL MEDICINE | Facility: CLINIC | Age: 58
End: 2023-03-06
Payer: COMMERCIAL

## 2023-03-06 VITALS
WEIGHT: 224.8 LBS | TEMPERATURE: 97.5 F | HEIGHT: 69 IN | DIASTOLIC BLOOD PRESSURE: 80 MMHG | SYSTOLIC BLOOD PRESSURE: 104 MMHG | BODY MASS INDEX: 33.3 KG/M2

## 2023-03-06 DIAGNOSIS — I10 ESSENTIAL HYPERTENSION: Chronic | ICD-10-CM

## 2023-03-06 DIAGNOSIS — E11.9 TYPE 2 DIABETES MELLITUS WITHOUT COMPLICATION, WITHOUT LONG-TERM CURRENT USE OF INSULIN: Chronic | ICD-10-CM

## 2023-03-06 DIAGNOSIS — Z99.89 OSA ON CPAP: ICD-10-CM

## 2023-03-06 DIAGNOSIS — R09.81 SINUS CONGESTION: ICD-10-CM

## 2023-03-06 DIAGNOSIS — G89.29 CHRONIC RIGHT SHOULDER PAIN: ICD-10-CM

## 2023-03-06 DIAGNOSIS — M25.511 CHRONIC RIGHT SHOULDER PAIN: ICD-10-CM

## 2023-03-06 DIAGNOSIS — N28.9 RENAL INSUFFICIENCY: ICD-10-CM

## 2023-03-06 DIAGNOSIS — G47.33 OSA ON CPAP: ICD-10-CM

## 2023-03-06 DIAGNOSIS — Z00.00 WELLNESS EXAMINATION: Primary | ICD-10-CM

## 2023-03-06 DIAGNOSIS — E03.8 OTHER SPECIFIED HYPOTHYROIDISM: Chronic | ICD-10-CM

## 2023-03-06 PROCEDURE — 99396 PREV VISIT EST AGE 40-64: CPT | Performed by: INTERNAL MEDICINE

## 2023-03-06 RX ORDER — ATENOLOL 100 MG/1
TABLET ORAL
Qty: 30 TABLET | Refills: 3 | Status: SHIPPED | OUTPATIENT
Start: 2023-03-06

## 2023-03-06 RX ORDER — LOSARTAN POTASSIUM 50 MG/1
50 TABLET ORAL DAILY
Qty: 30 TABLET | Refills: 3 | Status: SHIPPED | OUTPATIENT
Start: 2023-03-06

## 2023-03-06 NOTE — PROGRESS NOTES
Subjective        Chief Complaint   Patient presents with   • Hypertension   • Annual Exam           Beni Kunz is a 57 y.o. male who presents for    Patient Active Problem List   Diagnosis   • Other hyperlipidemia   • Essential hypertension   • Other specified hypothyroidism   • Type 2 diabetes mellitus without complication, without long-term current use of insulin (HCC)   • Alkaline phosphatase elevation   • Tubular adenoma of colon   • ROYCE on CPAP   • Class 1 obesity       History of Present Illness     His right shoulder and elbow continue to hurt; it started in May. He has done PT without relief. He has been checking his BP and it has  Been 128/76. He checks his sugar and they avg 154. He walks three days per week without chest pain. He denies melena or hematochezia. He is stable emotionally. He uses a CPAP but he takes it off in the middle of the night. He can be short of breath when his nasal cavity is full. He does sneeze much.  No Known Allergies    Current Outpatient Medications on File Prior to Visit   Medication Sig Dispense Refill   • atenolol (TENORMIN) 100 MG tablet TAKE 1 TABLET BY MOUTH EVERY DAY 30 tablet 3   • atorvastatin (LIPITOR) 20 MG tablet TAKE 1 TABLET BY MOUTH EVERY DAY 30 tablet 5   • busPIRone (BUSPAR) 10 MG tablet TAKE 1 TABLET BY MOUTH THREE TIMES A DAY AS NEEDED FOR ANXIETY 90 tablet 1   • cetirizine (zyrTEC) 10 MG tablet Take 1 tablet by mouth Daily.     • fluticasone (FLONASE) 50 MCG/ACT nasal spray SPRAY 1-2 SPRAYS INTO EACH NOSTRIL EVERY DAY  11   • hydroCHLOROthiazide (HYDRODIURIL) 12.5 MG tablet TAKE 1 TABLET BY MOUTH EVERY DAY 30 tablet 3   • omeprazole (priLOSEC) 40 MG capsule TAKE 1 CAPSULE BY MOUTH TWICE A DAY 60 capsule 11   • Synthroid 150 MCG tablet TAKE 1 TABLET BY MOUTH EVERY DAY 30 tablet 11   • [DISCONTINUED] losartan (COZAAR) 100 MG tablet TAKE 1 TABLET BY MOUTH EVERY DAY 30 tablet 5   • [DISCONTINUED] meloxicam (MOBIC) 7.5 MG tablet TAKE 1 TABLET BY MOUTH EVERY  DAY AS NEEDED FOR MILD PAIN 30 tablet 2     No current facility-administered medications on file prior to visit.       Past Medical History:   Diagnosis Date   • Allergic rhinitis    • Anxiety    • COVID-19 12/2021   • Esophagitis    • Fatty liver    • GERD (gastroesophageal reflux disease)    • Hepatitis C antibody positive in blood 09/26/2019   • IgA deficiency (HCC)    • Tubular adenoma of colon 01/2016   • Urticaria        Past Surgical History:   Procedure Laterality Date   • COLONOSCOPY  05/08/2019    repeat 3 years Dr. Mahendra Michelle   • COLONOSCOPY  09/02/2022    repeat in 5 years with Dr. Mahendra Michelle   • ESOPHAGEAL DILATATION  2018       Family History   Problem Relation Age of Onset   • Cancer Mother         sinus   • Heart disease Mother    • Arthritis Father    • Heart disease Father    • Colon polyps Father    • Stroke Father    • Hypertension Father    • Heart failure Father    • Colon cancer Brother    • Diabetes Maternal Aunt        Social History     Socioeconomic History   • Marital status: Single   Tobacco Use   • Smoking status: Never   • Smokeless tobacco: Never   Substance and Sexual Activity   • Alcohol use: Yes     Comment: VERY RARELY;  caffeine use- soda- sugar free coke   • Drug use: No   • Sexual activity: Yes     Birth control/protection: Condom           The following portions of the patient's history were reviewed and updated as appropriate: problem list, allergies, current medications, past medical history, past family history, past social history and past surgical history.    Review of Systems    Immunization History   Administered Date(s) Administered   • COVID-19 (PFIZER) PURPLE CAP 04/01/2021, 04/22/2021, 11/07/2021   • FluLaval/Fluzone >6mos 09/15/2020, 11/04/2021, 09/19/2022   • Fluzone Quad >6mos (Multi-dose) 12/05/2017, 11/01/2018   • Hepatitis A 01/23/2018, 08/15/2018   • Hepatitis B 01/23/2018, 08/15/2018, 02/15/2019   • Pneumococcal Polysaccharide (PPSV23) 02/15/2019   • Tdap  "01/01/2010, 06/16/2020   • flucelvax quad pfs =>4 YRS 09/26/2019       Objective   Vitals:    03/06/23 1335   BP: 104/80   Temp: 97.5 °F (36.4 °C)   Weight: 102 kg (224 lb 12.8 oz)   Height: 175.3 cm (69\")     Body mass index is 33.2 kg/m².  Physical Exam  Vitals reviewed.   Constitutional:       Appearance: He is well-developed.   HENT:      Head: Normocephalic and atraumatic.      Mouth/Throat:      Mouth: Mucous membranes are moist.      Pharynx: Oropharynx is clear.   Eyes:      Extraocular Movements: Extraocular movements intact.      Conjunctiva/sclera: Conjunctivae normal.      Pupils: Pupils are equal, round, and reactive to light.   Neck:      Thyroid: No thyromegaly.      Vascular: No carotid bruit.   Cardiovascular:      Rate and Rhythm: Normal rate and regular rhythm.      Heart sounds: Normal heart sounds. No murmur heard.  Pulmonary:      Effort: Pulmonary effort is normal.      Breath sounds: Normal breath sounds.   Abdominal:      General: There is no distension.      Palpations: Abdomen is soft. There is no mass.      Tenderness: There is no abdominal tenderness. There is no rebound.   Musculoskeletal:      Cervical back: Neck supple.   Feet:      Right foot:      Protective Sensation: 5 sites tested. 5 sites sensed.      Skin integrity: Skin integrity normal.      Left foot:      Protective Sensation: 5 sites tested. 5 sites sensed.      Skin integrity: Skin integrity normal.   Lymphadenopathy:      Cervical: No cervical adenopathy.   Skin:     General: Skin is warm.   Neurological:      Mental Status: He is alert.   Psychiatric:         Behavior: Behavior normal.         Procedures    Assessment & Plan   Diagnoses and all orders for this visit:    1. Wellness examination (Primary)    2. Essential hypertension  Comments:  BP is low; see below  Orders:  -     losartan (Cozaar) 50 MG tablet; Take 1 tablet by mouth Daily.  Dispense: 30 tablet; Refill: 3    3. Type 2 diabetes mellitus without " complication, without long-term current use of insulin (HCC)  Comments:  a1c is at goal    4. ROYCE on CPAP  Comments:  On CPAP now.    5. Other specified hypothyroidism  Comments:  TSH is at goal    6. Chronic right shoulder pain  -     Ambulatory Referral to Orthopedic Surgery    7. Renal insufficiency  Comments:  Stop NSAIDS. Increase water to 48 ounces daily.  Orders:  -     Basic Metabolic Panel; Future  -     Urinalysis With Culture If Indicated -; Future    8. Sinus congestion  -     Ambulatory Referral to ENT (Otolaryngology)             Cscope is UTD. Rectheo Lombardi. Discussed exercising 150 minutes per week.    Reviewed labs. Given BP has decreased so I will decrease his ARB; aware now to avoid NSAIDS. Diabetes is controlled.        Return in about 6 weeks (around 4/17/2023), or 30 minutes, for Lab Before FUP.

## 2023-03-21 ENCOUNTER — OFFICE VISIT (OUTPATIENT)
Dept: ORTHOPEDIC SURGERY | Facility: CLINIC | Age: 58
End: 2023-03-21
Payer: COMMERCIAL

## 2023-03-21 VITALS — TEMPERATURE: 97.8 F | BODY MASS INDEX: 33.61 KG/M2 | WEIGHT: 226.9 LBS | HEIGHT: 69 IN

## 2023-03-21 DIAGNOSIS — M77.9 CAPSULITIS: ICD-10-CM

## 2023-03-21 DIAGNOSIS — R52 PAIN: Primary | ICD-10-CM

## 2023-03-21 PROCEDURE — 20610 DRAIN/INJ JOINT/BURSA W/O US: CPT | Performed by: ORTHOPAEDIC SURGERY

## 2023-03-21 PROCEDURE — 99203 OFFICE O/P NEW LOW 30 MIN: CPT | Performed by: ORTHOPAEDIC SURGERY

## 2023-03-21 PROCEDURE — 73030 X-RAY EXAM OF SHOULDER: CPT | Performed by: ORTHOPAEDIC SURGERY

## 2023-03-21 RX ADMIN — LIDOCAINE HYDROCHLORIDE 2 ML: 10 INJECTION, SOLUTION EPIDURAL; INFILTRATION; INTRACAUDAL; PERINEURAL at 15:33

## 2023-03-21 RX ADMIN — METHYLPREDNISOLONE ACETATE 80 MG: 80 INJECTION, SUSPENSION INTRA-ARTICULAR; INTRALESIONAL; INTRAMUSCULAR; SOFT TISSUE at 15:33

## 2023-03-21 NOTE — PROGRESS NOTES
New Shoulder      Patient: Beni Kunz        YOB: 1965    Medical Record Number: [id number]        Chief Complaints: Right shoulder pain      History of Present Illness: This is a 57-year-old male is left-hand-dominant presents with right shoulder pain this been ongoing since last May he stated pain in his entire house but he is left-hand-dominant he had to move ladders and things with his right arm and he thinks that is what that was the culprit.  He has marked decrease in his range of motion he cannot take a Xuriden he is borderline diabetic which is controlled by diet.  Symptoms are moderate intermittent aching worse with activity somewhat better with rest past medical history is marked for reflux hep C IgA deficiency    Allergies: No Known Allergies    Medications:   Home Medications:  Current Outpatient Medications on File Prior to Visit   Medication Sig   • atenolol (TENORMIN) 100 MG tablet TAKE 1 TABLET BY MOUTH EVERY DAY   • atorvastatin (LIPITOR) 20 MG tablet TAKE 1 TABLET BY MOUTH EVERY DAY   • busPIRone (BUSPAR) 10 MG tablet TAKE 1 TABLET BY MOUTH THREE TIMES A DAY AS NEEDED FOR ANXIETY   • cetirizine (zyrTEC) 10 MG tablet Take 1 tablet by mouth Daily.   • fluticasone (FLONASE) 50 MCG/ACT nasal spray SPRAY 1-2 SPRAYS INTO EACH NOSTRIL EVERY DAY   • hydroCHLOROthiazide (HYDRODIURIL) 12.5 MG tablet TAKE 1 TABLET BY MOUTH EVERY DAY   • losartan (Cozaar) 50 MG tablet Take 1 tablet by mouth Daily.   • omeprazole (priLOSEC) 40 MG capsule TAKE 1 CAPSULE BY MOUTH TWICE A DAY   • Synthroid 150 MCG tablet TAKE 1 TABLET BY MOUTH EVERY DAY     No current facility-administered medications on file prior to visit.     Current Medications:  Scheduled Meds:  Continuous Infusions:No current facility-administered medications for this visit.    PRN Meds:.    Past Medical History:   Diagnosis Date   • Allergic rhinitis    • Ankle sprain 1981    Sprained ankle several times   • Anxiety    • COVID-19  "12/2021   • Esophagitis    • Fatty liver    • GERD (gastroesophageal reflux disease)    • Hepatitis C antibody positive in blood 09/26/2019   • IgA deficiency (HCC)    • Periarthritis of shoulder May 2022    Xray on right shoulder showed a little arthritis   • Rotator cuff syndrome 1981    Highschool Baseball injury   • Tubular adenoma of colon 01/2016   • Urticaria         Past Surgical History:   Procedure Laterality Date   • COLONOSCOPY  05/08/2019    repeat 3 years Dr. Mahendra Michelle   • COLONOSCOPY  09/02/2022    repeat in 5 years with Dr. Mahendra Michelle   • ESOPHAGEAL DILATATION  2018        Social History     Occupational History   • Not on file   Tobacco Use   • Smoking status: Never   • Smokeless tobacco: Never   Substance and Sexual Activity   • Alcohol use: Yes     Comment: Very rarely drink   • Drug use: No   • Sexual activity: Yes     Partners: Female     Birth control/protection: Condom      Social History     Social History Narrative   • Not on file        Family History   Problem Relation Age of Onset   • Cancer Mother         Sinus cancer   • Heart disease Mother    • Arthritis Father    • Heart disease Father    • Colon polyps Father    • Stroke Father    • Hypertension Father    • Heart failure Father    • Colon cancer Brother    • Diabetes Maternal Aunt              Review of Systems:     Review of Systems      Physical Exam: 57 y.o. male  General Appearance:    Alert, cooperative, in no acute distress                   Vitals:    03/21/23 1516   Temp: 97.8 °F (36.6 °C)   Weight: 103 kg (226 lb 14.4 oz)   Height: 175.3 cm (69\")   PainSc:   5      Patient is alert and read ×3 no acute distress appears her above-listed at height weight and age.  Affect is normal respiratory rate is normal unlabored. Heart rate regular rate rhythm, sclera, dentition and hearing are normal for the purpose of this exam.    Ortho Exam Physical exam of the right shoulder reveals no overlying skin changes no lymphedema no " lymphadenopathy.  Patient has active flexion 150 with mild symptoms passively I can get them to about 160 abduction is similar external rotation is to 0 and internal rotation to their buttocks with  symptoms.  Patient has good rotator cuff strength 4+ over 5 with isometric strength testing with pain.  Patient has a positive impingement and a positive Mtz sign.  Patient has good cervical range of motion which is full and asymptomatic no radicular symptoms.  Patient has a normal elbow exam.  Good distal pulses are present        Large Joint Arthrocentesis: R glenohumeral  Date/Time: 3/21/2023 3:33 PM  Consent given by: patient  Site marked: site marked  Timeout: Immediately prior to procedure a time out was called to verify the correct patient, procedure, equipment, support staff and site/side marked as required   Supporting Documentation  Indications: pain   Procedure Details  Location: shoulder - R glenohumeral  Preparation: Patient was prepped and draped in the usual sterile fashion  Needle gauge: 21G.  Approach: posterior  Medications administered: 80 mg methylPREDNISolone acetate 80 MG/ML; 2 mL lidocaine PF 1% 1 %  Patient tolerance: patient tolerated the procedure well with no immediate complications                Radiology:   AP, Scapular Y and Axillary Lateral of the right shoulder were ordered/reviewed to evauate shoulder pain.  I have no comparative films he has acromioclavicular arthritis and some sclerosis at the insertion of the cuff no other acute pathology  Imaging Results (Most Recent)     Procedure Component Value Units Date/Time    XR Shoulder 2+ View Right [[id number]] Resulted: 03/21/23 1459     Updated: 03/21/23 1459    Impression:      Ordering physician's impression is located in the Encounter Note dated 03/21/23. X-ray performed in the DR room.          Assessment/Plan: Right shoulder pain I suspect this is a straightforward frozen shoulder.  I discussed this entity with him in detail plan  is to proceed with a glenohumeral injection he cannot take any meloxicam due to an elevated creatinine so we will stick with a glenohumeral injection physical therapy and I will see him back in 5 weeks.  IV fails to improve would consider manipulation.  If we get his motion back and he still has symptoms would consider other means of testing  Cortisone Injection. See procedure note.

## 2023-03-22 ENCOUNTER — PATIENT ROUNDING (BHMG ONLY) (OUTPATIENT)
Dept: ORTHOPEDIC SURGERY | Facility: CLINIC | Age: 58
End: 2023-03-22
Payer: COMMERCIAL

## 2023-03-22 RX ORDER — METHYLPREDNISOLONE ACETATE 80 MG/ML
80 INJECTION, SUSPENSION INTRA-ARTICULAR; INTRALESIONAL; INTRAMUSCULAR; SOFT TISSUE
Status: COMPLETED | OUTPATIENT
Start: 2023-03-21 | End: 2023-03-21

## 2023-03-22 RX ORDER — LIDOCAINE HYDROCHLORIDE 10 MG/ML
2 INJECTION, SOLUTION EPIDURAL; INFILTRATION; INTRACAUDAL; PERINEURAL
Status: COMPLETED | OUTPATIENT
Start: 2023-03-21 | End: 2023-03-21

## 2023-03-22 NOTE — PROGRESS NOTES
A [employer] Message has been sent to the patient for PATIENT ROUNDING with [facility]

## 2023-03-31 ENCOUNTER — TREATMENT (OUTPATIENT)
Dept: PHYSICAL THERAPY | Facility: CLINIC | Age: 58
End: 2023-03-31
Payer: COMMERCIAL

## 2023-03-31 DIAGNOSIS — M75.01 ADHESIVE CAPSULITIS OF RIGHT SHOULDER: ICD-10-CM

## 2023-03-31 DIAGNOSIS — M25.511 CHRONIC RIGHT SHOULDER PAIN: Primary | ICD-10-CM

## 2023-03-31 DIAGNOSIS — G89.29 CHRONIC RIGHT SHOULDER PAIN: Primary | ICD-10-CM

## 2023-03-31 PROCEDURE — 97110 THERAPEUTIC EXERCISES: CPT | Performed by: PHYSICAL THERAPIST

## 2023-03-31 PROCEDURE — 97161 PT EVAL LOW COMPLEX 20 MIN: CPT | Performed by: PHYSICAL THERAPIST

## 2023-03-31 PROCEDURE — 97535 SELF CARE MNGMENT TRAINING: CPT | Performed by: PHYSICAL THERAPIST

## 2023-03-31 PROCEDURE — 97530 THERAPEUTIC ACTIVITIES: CPT | Performed by: PHYSICAL THERAPIST

## 2023-03-31 NOTE — PROGRESS NOTES
Physical Therapy Initial Evaluation and Plan of Care    Patient: Beni Kunz   : 1965  Diagnosis/ICD-10 Code:  Chronic right shoulder pain [M25.511, G89.29]  Referring practitioner: Valencia Goodman MD  Date of Initial Visit: 3/31/2023  Today's Date: 3/31/2023  Patient seen for 1 sessions  PT Clinic location: [address], [address]   [place]  [address]          Subjective Questionnaire: QuickDASH: 20.45    Subjective Evaluation    History of Present Illness  Date of onset: 2022  Mechanism of injury: Pt presents with ~11 month history of right shoulder pain, starting following house painting. He is left hand dominant.   He recently attended orthopedic surgeons office with diagnosis of adhesive capsulitis, with cortisone injection performed.    He reports usually having high levels of pain with reaching laterally, overhead, and behind back. Rapid motions of R UE also cause pain.  No concerns with lifting.  He reports some aching at night, intermittent.    Works as , mostly computer work/meetings.    Quality of life: good    Pain  At best pain ratin  At worst pain ratin (since cortisone injection)    Hand dominance: left    Treatments  Current treatment: injection treatment  Patient Goals  Patient goals for therapy: decreased pain, increased motion and independence with ADLs/IADLs           Medical history: T2DM, hypothyroidism. See chart for further detail.   Therapy Precautions: N/A    Objective          Active Range of Motion   Left Shoulder   Flexion: 150 degrees   Abduction: 165 degrees   External rotation 0°: 60 degrees     Right Shoulder   Flexion: 135 degrees with pain  Abduction: 120 degrees with pain  External rotation 0°: 50 degrees with pain    Passive Range of Motion   Left Shoulder   Normal passive range of motion    Right Shoulder   Flexion: 140 degrees with pain  Abduction: 125 degrees with pain  External rotation 90°: 50 degrees  with pain  Internal rotation 90°: 35 degrees with pain    Joint Play     Right Shoulder  Hypomobile in the anterior capsule, posterior capsule, inferior capsule and thoracic spine.     Strength/Myotome Testing     Left Shoulder     Isolated Muscles   Rhomboids: 5     Right Shoulder     Planes of Motion   Flexion: 4+ (p!)   Abduction: 4+ (p!)   External rotation at 0°: 4+     Isolated Muscles   Rhomboids: 4- (p!)     Tests     Right Shoulder   Positive Hawkin's.   Negative empty can and Speed's.           Assessment & Plan     Assessment  Impairments: abnormal or restricted ROM, impaired physical strength, lacks appropriate home exercise program and pain with function  Functional Limitations: uncomfortable because of pain, moving in bed, reaching behind back, reaching overhead and unable to perform repetitive tasks  Assessment details: The patient is a 57 y.o. male who presents to physical therapy today for right shoulder pain. Upon initial evaluation, the patient demonstrates the following impairments: decreased R shoulder ROM, decreased R GH joint mobility, decreased and painful R UE strength. Examination findings indicate likely adhesive capsulitis. Due to these impairments, the patient is unable to perform or has difficulty with the following functional tasks: reaching overhead, laterally, and behind back, difficulty sleeping. The patient would benefit from skilled PT services to address functional limitations and impairments and to improve patient quality of life.      Prognosis: good    Goals  Plan Goals: ST. Pt will be independent and compliant with initial HEP in 2 weeks.  2. Pt will report a 20% improvement in symptoms since starting therapy in 4 weeks.  3. Pt will demonstrate an increase in R shoulder abduction ROM to 130 degrees within 4 weeks.     LTG: - by DC (12 weeks)  1. Pt will be independent with final HEP for self-management of condition by DC.  2. Pt will improve score on QuickDASH to less  than 10% impairment by DC.   3. Pt will report a 50% improvement in symptoms by DC in order to allow return to PLOF.  4. Pt will improve R shoulder external rotation in 90 degrees abduction to at least 60 deg in order to be able to don/doff a shirt by DC.        Plan  Therapy options: will be seen for skilled therapy services  Planned modality interventions: cryotherapy, electrical stimulation/Russian stimulation, TENS, thermotherapy (hydrocollator packs) and ultrasound  Planned therapy interventions: body mechanics training, ADL retraining, flexibility, functional ROM exercises, home exercise program, joint mobilization, manual therapy, motor coordination training, neuromuscular re-education, postural training, soft tissue mobilization, spinal/joint mobilization, strengthening, stretching and therapeutic activities  Frequency: 2x week  Duration in weeks: 8  Treatment plan discussed with: patient        See flowsheets for treatment detail.  EDUCATION: pt educated at length on adhesive capsulitis pathology; pathoanatomy; interventions and POC, and HEP.    History # of Personal Factors and/or Comorbidities: HIGH (3+)  Examination of Body System(s): # of elements: LOW (1-2)  Clinical Presentation: STABLE   Clinical Decision Making: LOW       Timed:         Manual Therapy:         mins  52694;     Therapeutic Exercise:    10     mins  47504;     Neuromuscular Mc:        mins  18673;    Therapeutic Activity:     8     mins  97296;     Gait Training:           mins  58551;     Ultrasound:          mins  58212;    Ionto                                   mins   98491  Self Care                       10     mins   93196;      Un-Timed:  Electrical Stimulation:         mins  44663 ( );  Dry Needling          mins self-pay  Traction          mins 68075  Low Eval     20     Mins  83347  Mod Eval          Mins  12316  High Eval                            Mins  96376  Re-Eval                               mins   69757      Timed Treatment:   28   mins   Total Treatment:     48   mins    PT SIGNATURE: Catarino Draper, ADEN   [facility] [id number]  DATE TREATMENT INITIATED: 3/31/2023    Initial Certification  Certification Period: 6/28/2023  I certify that the therapy services are furnished while this patient is under my care.  The services outlined above are required by this patient, and will be reviewed every 90 days.    PHYSICIAN: Valencia Goodman MD  NPI: [id number]                                      DATE:     Please sign and return via fax to [facility] - Fax #: [phone]. Thank you, [facility].

## 2023-04-07 DIAGNOSIS — E78.5 HYPERLIPIDEMIA, UNSPECIFIED HYPERLIPIDEMIA TYPE: ICD-10-CM

## 2023-04-07 DIAGNOSIS — I10 ESSENTIAL HYPERTENSION: Chronic | ICD-10-CM

## 2023-04-07 RX ORDER — ATORVASTATIN CALCIUM 20 MG/1
TABLET, FILM COATED ORAL
Qty: 30 TABLET | Refills: 5 | Status: SHIPPED | OUTPATIENT
Start: 2023-04-07

## 2023-04-07 RX ORDER — HYDROCHLOROTHIAZIDE 12.5 MG/1
TABLET ORAL
Qty: 30 TABLET | Refills: 3 | Status: SHIPPED | OUTPATIENT
Start: 2023-04-07

## 2023-04-11 ENCOUNTER — TREATMENT (OUTPATIENT)
Dept: PHYSICAL THERAPY | Facility: CLINIC | Age: 58
End: 2023-04-11
Payer: COMMERCIAL

## 2023-04-11 DIAGNOSIS — M75.01 ADHESIVE CAPSULITIS OF RIGHT SHOULDER: ICD-10-CM

## 2023-04-11 DIAGNOSIS — M25.511 RIGHT SHOULDER PAIN, UNSPECIFIED CHRONICITY: ICD-10-CM

## 2023-04-11 DIAGNOSIS — G89.29 CHRONIC RIGHT SHOULDER PAIN: Primary | ICD-10-CM

## 2023-04-11 DIAGNOSIS — M25.511 CHRONIC RIGHT SHOULDER PAIN: Primary | ICD-10-CM

## 2023-04-11 PROCEDURE — 97110 THERAPEUTIC EXERCISES: CPT | Performed by: PHYSICAL THERAPIST

## 2023-04-11 NOTE — PROGRESS NOTES
Physical Therapy Daily Treatment Note  Visit # 2        Patient: Beni Kunz   : 1965  Referring practitioner: Valencia Goodman MD  Date of Initial Evaluation:  Type: THERAPY  Noted: 3/31/2023  Today's Date: 2023           ICD-10-CM ICD-9-CM   1. Chronic right shoulder pain  M25.511 719.41    G89.29 338.29   2. Adhesive capsulitis of right shoulder  M75.01 726.0   3. Right shoulder pain, unspecified chronicity  M25.511 719.41       Subjective  Beni Kunz reports:   I could feel the shoulder loosening up after the evaluation.        No significant changes from status at last PT visit through today.    Doing well overall, no specific areas of pain noted at onset of today's visit.        Objective   See Exercise, Manual, and Modality Logs for complete treatment.   Reviewed current HEP, progressed therex program with exercises as noted.  Added ER w/cane at 45deg ABD to HEP, written instructions issued via [employer].    Concluded session with CP to (R) shld x10m.    Assessment/Plan  Tolerated continued progression of therapeutic exercise/therapeutic activity well today, no increased pain reported during or after exercises.  Would continue to benefit from skilled PT progressing with functional ROM and strength of R (UE).        Progress per Plan of Care and Progress strengthening /stabilization /functional activity           Timed:         Manual Therapy:         mins  61567     Therapeutic Exercise:     45    mins  07736     Neuromuscular Mc:        mins  91158    Therapeutic Activity:          mins  66989     Gait Training:           mins  20047     Ultrasound:          mins  06694    Ionto                                   mins  42920  Self Care                            mins  80963    Un-Timed:  Electrical Stimulation:         mins 48274 ( )  Traction          mins 75241    Timed Treatment:   45   mins   Total Treatment:     45   mins    Noman Harris PTA  [place] License #[id number]  Physical  Therapist Assistant

## 2023-04-13 ENCOUNTER — TREATMENT (OUTPATIENT)
Dept: PHYSICAL THERAPY | Facility: CLINIC | Age: 58
End: 2023-04-13
Payer: COMMERCIAL

## 2023-04-13 DIAGNOSIS — M25.511 CHRONIC RIGHT SHOULDER PAIN: Primary | ICD-10-CM

## 2023-04-13 DIAGNOSIS — M25.511 RIGHT SHOULDER PAIN, UNSPECIFIED CHRONICITY: ICD-10-CM

## 2023-04-13 DIAGNOSIS — G89.29 CHRONIC RIGHT SHOULDER PAIN: Primary | ICD-10-CM

## 2023-04-13 DIAGNOSIS — M75.01 ADHESIVE CAPSULITIS OF RIGHT SHOULDER: ICD-10-CM

## 2023-04-13 NOTE — PROGRESS NOTES
Physical Therapy Daily Treatment Note  Visit # 3        Patient: Beni Kunz   : 1965  Referring practitioner: Valencia Goodman MD  Date of Initial Evaluation:  Type: THERAPY  Noted: 3/31/2023  Today's Date: 2023           ICD-10-CM ICD-9-CM   1. Chronic right shoulder pain  M25.511 719.41    G89.29 338.29   2. Adhesive capsulitis of right shoulder  M75.01 726.0   3. Right shoulder pain, unspecified chronicity  M25.511 719.41       Subjective  Beni Kunz reports:   I still have some pain when taking off my jacket, otherwise I'm doing OK.  No significant symptoms noted after last PT session.    Objective   See Exercise, Manual, and Modality Logs for complete treatment.   Reviewed current HEP, progressed therex program with exercises as noted.  Added ER w/cane at 45deg ABD to HEP, written instructions issued via [employer].    Concluded session with CP to (R) shld x10m.    Assessment/Plan  Tolerated continued progression of therapeutic exercise/therapeutic activity well today, no increased pain reported during or after exercises.  Would continue to benefit from skilled PT progressing with functional ROM and strength of R (UE).        Progress per Plan of Care and Progress strengthening /stabilization /functional activity           Timed:         Manual Therapy:         mins  29641     Therapeutic Exercise:     45    mins  54656     Neuromuscular Mc:        mins  52085    Therapeutic Activity:          mins  91217     Gait Training:           mins  47426     Ultrasound:          mins  49221    Ionto                                   mins  19108  Self Care                            mins  78479    Un-Timed:  Electrical Stimulation:         mins 20686 ( )  Traction          mins 73483    Timed Treatment:   45   mins   Total Treatment:     45   mins    JACEY Lowry License #[id number]  Physical Therapist Assistant

## 2023-04-17 ENCOUNTER — OFFICE VISIT (OUTPATIENT)
Dept: INTERNAL MEDICINE | Facility: CLINIC | Age: 58
End: 2023-04-17
Payer: COMMERCIAL

## 2023-04-17 ENCOUNTER — TREATMENT (OUTPATIENT)
Dept: PHYSICAL THERAPY | Facility: CLINIC | Age: 58
End: 2023-04-17
Payer: COMMERCIAL

## 2023-04-17 VITALS
SYSTOLIC BLOOD PRESSURE: 110 MMHG | TEMPERATURE: 97.8 F | WEIGHT: 226 LBS | BODY MASS INDEX: 33.47 KG/M2 | DIASTOLIC BLOOD PRESSURE: 80 MMHG | HEIGHT: 69 IN

## 2023-04-17 DIAGNOSIS — M25.511 CHRONIC RIGHT SHOULDER PAIN: Primary | ICD-10-CM

## 2023-04-17 DIAGNOSIS — M75.01 ADHESIVE CAPSULITIS OF RIGHT SHOULDER: ICD-10-CM

## 2023-04-17 DIAGNOSIS — G89.29 CHRONIC RIGHT SHOULDER PAIN: Primary | ICD-10-CM

## 2023-04-17 DIAGNOSIS — E03.8 OTHER SPECIFIED HYPOTHYROIDISM: Chronic | ICD-10-CM

## 2023-04-17 DIAGNOSIS — M79.671 PAIN OF RIGHT HEEL: ICD-10-CM

## 2023-04-17 DIAGNOSIS — N28.9 MILD RENAL INSUFFICIENCY: Primary | ICD-10-CM

## 2023-04-17 PROCEDURE — 99213 OFFICE O/P EST LOW 20 MIN: CPT | Performed by: INTERNAL MEDICINE

## 2023-04-17 NOTE — PROGRESS NOTES
Subjective        Chief Complaint   Patient presents with   • Hypertension           Beni Kunz is a 57 y.o. male who presents for    Patient Active Problem List   Diagnosis   • Other hyperlipidemia   • Essential hypertension   • Other specified hypothyroidism   • Type 2 diabetes mellitus without complication, without long-term current use of insulin   • Alkaline phosphatase elevation   • Tubular adenoma of colon   • ROYCE on CPAP   • Class 1 obesity   • Mild renal insufficiency       History of Present Illness     He has had right heel pain since moving. He has taken APAP which has helped. It is worse when he first gets up. He has been checking his BP and it has been 124/78. He denies chest pain. He sees ENT in May. PT is helping his frozen shoulder.  No Known Allergies    Current Outpatient Medications on File Prior to Visit   Medication Sig Dispense Refill   • atenolol (TENORMIN) 100 MG tablet TAKE 1 TABLET BY MOUTH EVERY DAY 30 tablet 3   • atorvastatin (LIPITOR) 20 MG tablet TAKE 1 TABLET BY MOUTH EVERY DAY 30 tablet 5   • busPIRone (BUSPAR) 10 MG tablet TAKE 1 TABLET BY MOUTH THREE TIMES A DAY AS NEEDED FOR ANXIETY 90 tablet 1   • cetirizine (zyrTEC) 10 MG tablet Take 1 tablet by mouth Daily.     • fluticasone (FLONASE) 50 MCG/ACT nasal spray SPRAY 1-2 SPRAYS INTO EACH NOSTRIL EVERY DAY  11   • hydroCHLOROthiazide (HYDRODIURIL) 12.5 MG tablet TAKE 1 TABLET BY MOUTH EVERY DAY 30 tablet 3   • losartan (Cozaar) 50 MG tablet Take 1 tablet by mouth Daily. 30 tablet 3   • omeprazole (priLOSEC) 40 MG capsule TAKE 1 CAPSULE BY MOUTH TWICE A DAY 60 capsule 11   • Synthroid 150 MCG tablet TAKE 1 TABLET BY MOUTH EVERY DAY 30 tablet 11     No current facility-administered medications on file prior to visit.       Past Medical History:   Diagnosis Date   • Allergic rhinitis    • Anxiety    • COVID-19 12/2021   • Esophagitis    • Fatty liver    • GERD (gastroesophageal reflux disease)    • Hepatitis C antibody positive in  "blood 09/26/2019   • IgA deficiency    • Tubular adenoma of colon 01/2016   • Urticaria        Past Surgical History:   Procedure Laterality Date   • COLONOSCOPY  05/08/2019    repeat 3 years Dr. Mahendra Michelle   • COLONOSCOPY  09/02/2022    repeat in 5 years with Dr. Mahendra Michelle   • ESOPHAGEAL DILATATION  2018       Family History   Problem Relation Age of Onset   • Cancer Mother         Sinus cancer   • Heart disease Mother    • Arthritis Father    • Heart disease Father    • Colon polyps Father    • Stroke Father    • Hypertension Father    • Heart failure Father    • Colon cancer Brother    • Diabetes Maternal Aunt        Social History     Socioeconomic History   • Marital status: Single   Tobacco Use   • Smoking status: Never   • Smokeless tobacco: Never   Substance and Sexual Activity   • Alcohol use: Yes     Comment: Very rarely drink   • Drug use: No   • Sexual activity: Yes     Partners: Female     Birth control/protection: Condom           The following portions of the patient's history were reviewed and updated as appropriate: problem list, allergies, current medications, past medical history, past family history, past social history and past surgical history.    Review of Systems    Immunization History   Administered Date(s) Administered   • COVID-19 (PFIZER) PURPLE CAP 04/01/2021, 04/22/2021, 11/07/2021   • FluLaval/Fluzone >6mos 09/15/2020, 11/04/2021, 09/19/2022   • Fluzone Quad >6mos (Multi-dose) 12/05/2017, 11/01/2018   • Hepatitis A 01/23/2018, 08/15/2018   • Hepatitis B Adult/Adolescent IM 01/23/2018, 08/15/2018, 02/15/2019   • Pneumococcal Polysaccharide (PPSV23) 02/15/2019   • Tdap 01/01/2010, 06/16/2020   • flucelvax quad pfs =>4 YRS 09/26/2019       Objective   Vitals:    04/17/23 1440   BP: 110/80   Temp: 97.8 °F (36.6 °C)   Weight: 103 kg (226 lb)   Height: 175.3 cm (69.02\")     Body mass index is 33.36 kg/m².  Physical Exam  Vitals reviewed.   Constitutional:       Appearance: He is " well-developed.   HENT:      Head: Normocephalic and atraumatic.   Cardiovascular:      Rate and Rhythm: Normal rate and regular rhythm.      Heart sounds: Normal heart sounds, S1 normal and S2 normal.   Pulmonary:      Effort: Pulmonary effort is normal.      Breath sounds: Normal breath sounds.   Feet:      Comments: Right heel non tender  Skin:     General: Skin is warm.   Neurological:      Mental Status: He is alert.   Psychiatric:         Behavior: Behavior normal.         Procedures    Assessment & Plan   Diagnoses and all orders for this visit:    1. Mild renal insufficiency (Primary)  Comments:  Off NSAID. UA and bmp noted.  Orders:  -     Basic Metabolic Panel; Future    2. Pain of right heel  Comments:  Possible plantar fasciitis. Recc ice, stretch and new shoes.    3. Other specified hypothyroidism  -     TSH; Future             Reviewed bmp and ua. Keep meds where they are. If Cr worsened then would back down BP meds more. Think related to HCTZ.    Return in about 5 months (around 9/17/2023) for Lab Before FUP.

## 2023-04-17 NOTE — PROGRESS NOTES
Physical Therapy Daily Treatment Note    Patient: Beni Kunz  : 1965  Referring practitioner: Valencia Goodman MD  Today's Date: 2023    VISIT#: 4      Subjective   Beni Kunz reports: compliant with HEP, still feels behind the back and sleeper stretch with some discomfort.      Objective          Active Range of Motion     Right Shoulder   Flexion: 150 degrees with pain  Abduction: 150 degrees with pain  External rotation 0°: 60 degrees with pain        See Exercise, Manual, and Modality Logs for complete treatment.     Patient Education: decreasing POC, progressions and plan; updated HEP.    Assessment/Plan  Pt demonstrates good improvements in shoulder pain and ROM since eval. He shows good performance of all exercises with some cuing for decreased stretch intensity. Educated on current progress and plans for decreasing frequency and modification of POC prior to discharge planned for several weeks.      Other  Decrease POC to 1x weekly and anticipate discharge soon as self-management, independence improve.          Timed:         Manual Therapy:         mins  37564;     Therapeutic Exercise:    10     mins  58612;     Neuromuscular Mc:    8    mins  57104;    Therapeutic Activity:     12     mins  07969;     Gait Training:           mins  56974;     Ultrasound:          mins  56421;    Ionto:                                   mins  97021  Self Care:                       10     mins  42357    Un-Timed:  Electrical Stimulation:         mins  31312 ( );  Dry Needling          mins self-pay  Traction          mins 72362  Re-Eval                               mins  89317  Group Therapy           ____ mins 10428    Timed Treatment:   40   mins   Total Treatment:     40   mins    Catarino Draper PT  Physical Therapist  [facility] license #: [id number]

## 2023-04-24 ENCOUNTER — TREATMENT (OUTPATIENT)
Dept: PHYSICAL THERAPY | Facility: CLINIC | Age: 58
End: 2023-04-24
Payer: COMMERCIAL

## 2023-04-24 DIAGNOSIS — M25.511 CHRONIC RIGHT SHOULDER PAIN: Primary | ICD-10-CM

## 2023-04-24 DIAGNOSIS — G89.29 CHRONIC RIGHT SHOULDER PAIN: Primary | ICD-10-CM

## 2023-04-24 DIAGNOSIS — M75.01 ADHESIVE CAPSULITIS OF RIGHT SHOULDER: ICD-10-CM

## 2023-04-24 NOTE — PROGRESS NOTES
Physical Therapy Daily Treatment Note    Patient: Beni Kunz  : 1965  Referring practitioner: Valencia Goodman MD  Today's Date: 2023    VISIT#: 5      Subjective   Beni Kunz reports: continued mild discomfort with rapid movements, and with behind back reaching.      Objective     See Exercise, Manual, and Modality Logs for complete treatment.     Patient Education: shoulder mechanics and function; discharge planning      Assessment/Plan  Pt was educated at length about pathoanatomy of shoulder joint and rationale for focus on control during all exercises, including avoidance of intense stretching. He demonstrated improved control during rotational and scaption plane movements this session with cuing for decreased speed and avoiding provocative ranges.  Pt reports currently happy with HEP and plans to continue exercises independently.    Plan to leave episode of care open in case of pt requiring further skilled interventions, else discharge in 30 days.    Progress per Plan of Care            Timed:         Manual Therapy:         mins  54506;     Therapeutic Exercise:    9     mins  60232;     Neuromuscular Mc:    8    mins  10058;    Therapeutic Activity:     20     mins  34801;     Gait Training:           mins  77024;     Ultrasound:          mins  29653;    Ionto:                                   mins  17505  Self Care:                       10     mins  89267    Un-Timed:  Electrical Stimulation:         mins  82203 ( );  Dry Needling          mins self-pay  Traction          mins 99461  Re-Eval                               mins  32278  Group Therapy           ____ mins 02097    Timed Treatment:   45   mins   Total Treatment:     45   mins    Catarino Draper PT  Physical Therapist  [place] [facility] license #: [id number]

## 2023-04-27 ENCOUNTER — OFFICE VISIT (OUTPATIENT)
Dept: SLEEP MEDICINE | Facility: HOSPITAL | Age: 58
End: 2023-04-27
Payer: COMMERCIAL

## 2023-04-27 VITALS
SYSTOLIC BLOOD PRESSURE: 141 MMHG | HEART RATE: 62 BPM | DIASTOLIC BLOOD PRESSURE: 75 MMHG | OXYGEN SATURATION: 98 % | BODY MASS INDEX: 33.18 KG/M2 | WEIGHT: 224 LBS | HEIGHT: 69 IN

## 2023-04-27 DIAGNOSIS — G47.33 OSA (OBSTRUCTIVE SLEEP APNEA): Primary | ICD-10-CM

## 2023-04-27 DIAGNOSIS — E66.9 CLASS 1 OBESITY: ICD-10-CM

## 2023-04-27 DIAGNOSIS — G47.33 OSA ON CPAP: ICD-10-CM

## 2023-04-27 DIAGNOSIS — Z99.89 OSA ON CPAP: ICD-10-CM

## 2023-04-27 NOTE — PROGRESS NOTES
"  [facility]  [address]  [address]  [place] [address]  Phone   Fax       SLEEP CLINIC FOLLOW UP PROGRESS NOTE.    Beni Kunz  [id number]   1965  57 y.o.  male      PCP: Jet Moreno MD      Date of visit: 4/27/2023    Chief Complaint   Patient presents with   • Sleep Apnea   • Obesity       HPI:  This is a 57 y.o. years old patient is here for the management of obstructive sleep apnea.  Sleep apnea is moderate in severity with a AHI of 15.1/hr. Patient is using positive airway pressure therapy with auto CPAP.  Unfortunately patient has tried the CPAP and not able to use the CPAP.  He has tried for several months and changed the masks.  He is here to discuss alternate treatments.  I have talked to the patient about mandibular advancement device.  Patient denies TMJ pain.  He also has good set of teeth.  He is a good candidate for mandibular advancement device.    Normally goes to bed around 12 midnight  Wakes up around 8 AM    Medications and allergies are reviewed by me and documented in the encounter.     SOCIAL (habits pertaining to sleep medicine)  • History tobacco use:No   • History of alcohol use: 0 per week  • Caffeine use: 3     REVIEW OF SYSTEMS:   Pertaining positive symptoms are:  • [place] Sleepiness Scale :Total score: 4   • Snoring      PHYSICAL EXAMINATION:  CONSTITUTIONAL:  Vitals:    04/27/23 1551   BP: 141/75   Pulse: 62   SpO2: 98%   Weight: 102 kg (224 lb)   Height: 175.3 cm (69.02\")    Body mass index is 33.06 kg/m².   NOSE: nasal passages are clear, No deformities noted   RESP SYSTEM: Not in any respiratory distress, no chest deformities noted,   CARDIOVASULAR: No edema noted  NEURO: Oriented x 3, gait normal,  Mood and affect appeared appropriate      Very poor compliance with the CPAP      ASSESSMENT AND PLAN:  · Obstructive sleep apnea ( G 47.33).  Very poor compliance with the CPAP and he has tried it for several months " with a different masks.  I talked to the patient about mandibular advancement device.  He has moderate sleep apnea and is a good candidate for the device.  I am going to send him to see Dr. Gonzalez for evaluation and treatment.  The mandibular advancement device is medically necessary to treat his moderate sleep apnea.  · Obesity  1 with BMI is Body mass index is 33.06 kg/m².. I have discuss the relationship between the weight and sleep apnea. The benefit of weight loss in reducing severity of sleep apnea was discussed. Discussed diet and exercise with the patient to achieve ideal BMI.   · Return for Next scheduled follow-up Home sleep test after the dental device is made. . Patient's questions were answered.    Copy to Dr. Gonzalez DMD    4/27/2023  Tawana Gamez MD  Sleep Medicine.  Medical Director,   [facility].

## 2023-05-10 NOTE — PROGRESS NOTES
Patient: Beni Kunz  YOB: 1965  Date of Service: 5/10/2023    Chief Complaints: Right shoulder pain    Subjective:    History of Present Illness: Pt is seen in the office today with complaints of right shoulder pain he was felt to have capsulitis we injected him start him into therapy he states he is doing great he has about 25% of his original symptoms remaining is mostly at the end ranges still little bit of tightness but overall much improved he has been discharged from therapy        Allergies: No Known Allergies    Medications:   Home Medications:  Current Outpatient Medications on File Prior to Visit   Medication Sig   • atenolol (TENORMIN) 100 MG tablet TAKE 1 TABLET BY MOUTH EVERY DAY   • atorvastatin (LIPITOR) 20 MG tablet TAKE 1 TABLET BY MOUTH EVERY DAY   • busPIRone (BUSPAR) 10 MG tablet TAKE 1 TABLET BY MOUTH THREE TIMES A DAY AS NEEDED FOR ANXIETY   • cetirizine (zyrTEC) 10 MG tablet Take 1 tablet by mouth Daily.   • fluticasone (FLONASE) 50 MCG/ACT nasal spray SPRAY 1-2 SPRAYS INTO EACH NOSTRIL EVERY DAY   • hydroCHLOROthiazide (HYDRODIURIL) 12.5 MG tablet TAKE 1 TABLET BY MOUTH EVERY DAY   • losartan (Cozaar) 50 MG tablet Take 1 tablet by mouth Daily.   • omeprazole (priLOSEC) 40 MG capsule TAKE 1 CAPSULE BY MOUTH TWICE A DAY   • Synthroid 150 MCG tablet TAKE 1 TABLET BY MOUTH EVERY DAY     No current facility-administered medications on file prior to visit.     Current Medications:  Scheduled Meds:  Continuous Infusions:No current facility-administered medications for this visit.    PRN Meds:.    I have reviewed the patient's medical history in detail and updated the computerized patient record.  Review and summarization of old records include:    Past Medical History:   Diagnosis Date   • Allergic rhinitis    • Anxiety    • COVID-19 12/2021   • Esophagitis    • Fatty liver    • GERD (gastroesophageal reflux disease)    • Hepatitis C antibody positive in blood 09/26/2019   • IgA  deficiency    • Tubular adenoma of colon 01/2016   • Urticaria         Past Surgical History:   Procedure Laterality Date   • COLONOSCOPY  05/08/2019    repeat 3 years Dr. Mahendra Michelle   • COLONOSCOPY  09/02/2022    repeat in 5 years with Dr. Mahendra Michelle   • ESOPHAGEAL DILATATION  2018        Social History     Occupational History   • Not on file   Tobacco Use   • Smoking status: Never   • Smokeless tobacco: Never   Substance and Sexual Activity   • Alcohol use: Yes     Comment: Very rarely drink   • Drug use: No   • Sexual activity: Yes     Partners: Female     Birth control/protection: Condom      Social History     Social History Narrative   • Not on file        Family History   Problem Relation Age of Onset   • Cancer Mother         Sinus cancer   • Heart disease Mother    • Arthritis Father    • Heart disease Father    • Colon polyps Father    • Stroke Father    • Hypertension Father    • Heart failure Father    • Colon cancer Brother    • Diabetes Maternal Aunt        ROS: 14 point review of systems was performed and was negative except for documented findings in HPI and today's encounter.     Allergies: No Known Allergies  Constitutional:  Denies fever, shaking or chills   Eyes:  Denies change in visual acuity   HENT:  Denies nasal congestion or sore throat   Respiratory:  Denies cough or shortness of breath   Cardiovascular:  Denies chest pain or severe LE edema   GI:  Denies abdominal pain, nausea, vomiting, bloody stools or diarrhea   Musculoskeletal:  Numbness, tingling, or loss of motor function only as noted above in history of present illness.  : Denies painful urination or hematuria  Integument:  Denies rash, lesion or ulceration   Neurologic:  Denies headache or focal weakness  Endocrine:  Denies lymphadenopathy  Psych:  Denies confusion or change in mental status   Hem:  Denies active bleeding      Physical Exam: 57 y.o. male  Wt Readings from Last 3 Encounters:   04/27/23 102 kg (224 lb)   04/17/23 103  kg (226 lb)   03/21/23 103 kg (226 lb 14.4 oz)       There is no height or weight on file to calculate BMI.    There were no vitals filed for this visit.  Vital signs reviewed.   General Appearance:    Alert, cooperative, in no acute distress                    Ortho exam    Physical exam of the right shoulder reveals no overlying skin changes no lymphedema no lymphadenopathy.  Patient has active flexion 175 with mild symptoms abduction is similar external rotation is to 50 and internal rotation to the lower lumbar spine with mild symptoms.  Patient has good rotator cuff strength 4+ over 5 with isometric strength testing with pain.  Patient has a positive impingement and a positive Mtz sign.  Patient has good cervical range of motion which is full and asymptomatic no radicular symptoms.  Patient has a normal elbow exam.  Good distal pulses are present  Patient has pain with overhead activity and a positive Neer sign and a positive empty can sign , a positive drop arm and a definitive painful arc             Assessment: Status post right shoulder pain he does did have capsulitis it seems to be most part resolved he has mild limitation still    Plan: Continue lasted a better range of motion work on strengthening as long as he is doing well he can follow-up as needed  Follow up as indicated.  Ice, elevate, and rest as needed.  Discussed conservative measures of pain control including ice, bracing.    Valencia Goodman M.D.

## 2023-05-12 ENCOUNTER — OFFICE VISIT (OUTPATIENT)
Dept: ORTHOPEDIC SURGERY | Facility: CLINIC | Age: 58
End: 2023-05-12
Payer: COMMERCIAL

## 2023-05-12 VITALS — HEIGHT: 69 IN | BODY MASS INDEX: 33.52 KG/M2 | TEMPERATURE: 96.9 F | WEIGHT: 226.3 LBS

## 2023-05-12 DIAGNOSIS — M75.01 ADHESIVE CAPSULITIS OF RIGHT SHOULDER: Primary | ICD-10-CM

## 2023-05-12 PROCEDURE — 99212 OFFICE O/P EST SF 10 MIN: CPT | Performed by: ORTHOPAEDIC SURGERY

## 2023-05-22 ENCOUNTER — OFFICE VISIT (OUTPATIENT)
Dept: INTERNAL MEDICINE | Facility: CLINIC | Age: 58
End: 2023-05-22
Payer: COMMERCIAL

## 2023-05-22 VITALS — BODY MASS INDEX: 33.18 KG/M2 | WEIGHT: 224 LBS | HEIGHT: 69 IN | TEMPERATURE: 96.5 F

## 2023-05-22 DIAGNOSIS — J01.90 ACUTE NON-RECURRENT SINUSITIS, UNSPECIFIED LOCATION: ICD-10-CM

## 2023-05-22 DIAGNOSIS — H93.8X3 SENSATION OF FULLNESS IN BOTH EARS: Primary | ICD-10-CM

## 2023-05-22 PROCEDURE — 99213 OFFICE O/P EST LOW 20 MIN: CPT | Performed by: NURSE PRACTITIONER

## 2023-05-22 RX ORDER — GUAIFENESIN 600 MG/1
1200 TABLET, EXTENDED RELEASE ORAL 2 TIMES DAILY
Start: 2023-05-22

## 2023-05-22 NOTE — PROGRESS NOTES
Subjective   Chief Complaint   Patient presents with   • Earache       History of Present Illness   57 y.o. male presents with cc ear pain and sinus congestion times 10-14 days; he is followed by Dr. Moreno.     Denies fever or chills. Feel pressure in his ears, fullness and occasional popping. He has been using Afrin several days without improvement. Scheduled with ENT in June. Notes PND. Sinus drainage is clear. No cough. Continues Flonase and Zyrtec.      Patient Active Problem List   Diagnosis   • Other hyperlipidemia   • Essential hypertension   • Other specified hypothyroidism   • Type 2 diabetes mellitus without complication, without long-term current use of insulin   • Alkaline phosphatase elevation   • Tubular adenoma of colon   • ROYCE on CPAP   • Class 1 obesity   • Mild renal insufficiency       No Known Allergies    Current Outpatient Medications on File Prior to Visit   Medication Sig Dispense Refill   • atenolol (TENORMIN) 100 MG tablet TAKE 1 TABLET BY MOUTH EVERY DAY 30 tablet 3   • atorvastatin (LIPITOR) 20 MG tablet TAKE 1 TABLET BY MOUTH EVERY DAY 30 tablet 5   • busPIRone (BUSPAR) 10 MG tablet TAKE 1 TABLET BY MOUTH THREE TIMES A DAY AS NEEDED FOR ANXIETY 90 tablet 1   • cetirizine (zyrTEC) 10 MG tablet Take 1 tablet by mouth Daily.     • fluticasone (FLONASE) 50 MCG/ACT nasal spray SPRAY 1-2 SPRAYS INTO EACH NOSTRIL EVERY DAY  11   • hydroCHLOROthiazide (HYDRODIURIL) 12.5 MG tablet TAKE 1 TABLET BY MOUTH EVERY DAY 30 tablet 3   • losartan (Cozaar) 50 MG tablet Take 1 tablet by mouth Daily. 30 tablet 3   • omeprazole (priLOSEC) 40 MG capsule TAKE 1 CAPSULE BY MOUTH TWICE A DAY 60 capsule 11   • Synthroid 150 MCG tablet TAKE 1 TABLET BY MOUTH EVERY DAY 30 tablet 11     No current facility-administered medications on file prior to visit.       Past Medical History:   Diagnosis Date   • Allergic rhinitis    • Anxiety    • COVID-19 12/2021   • Esophagitis    • Fatty liver    • GERD (gastroesophageal  "reflux disease)    • Hepatitis C antibody positive in blood 09/26/2019   • IgA deficiency    • Urticaria        Family History   Problem Relation Age of Onset   • Cancer Mother         Sinus cancer   • Heart disease Mother    • Arthritis Father    • Heart disease Father    • Colon polyps Father    • Stroke Father    • Hypertension Father    • Heart failure Father    • Colon cancer Brother    • Diabetes Maternal Aunt        Social History     Socioeconomic History   • Marital status: Single   Tobacco Use   • Smoking status: Never   • Smokeless tobacco: Never   Substance and Sexual Activity   • Alcohol use: Yes     Comment: Very rarely drink   • Drug use: No   • Sexual activity: Yes     Partners: Female     Birth control/protection: Condom       Past Surgical History:   Procedure Laterality Date   • COLONOSCOPY  05/08/2019    repeat 3 years Dr. Mahendra Michelle   • COLONOSCOPY  09/02/2022    repeat in 5 years with Dr. Mahendra Michelle   • ESOPHAGEAL DILATATION  2018       The following portions of the patient's history were reviewed and updated as appropriate: problem list, allergies, current medications, past medical history and past social history.    Review of Systems    Immunization History   Administered Date(s) Administered   • FluLaval/Fluzone >6mos 09/15/2020, 11/04/2021, 09/19/2022   • Fluzone Quad >6mos (Multi-dose) 12/05/2017, 11/01/2018   • Hepatitis A 01/23/2018, 08/15/2018   • Hepatitis B Adult/Adolescent IM 01/23/2018, 08/15/2018, 02/15/2019   • Pneumococcal Polysaccharide (PPSV23) 02/15/2019   • Tdap 01/01/2010, 06/16/2020   • flucelvax quad pfs =>4 YRS 09/26/2019       Objective   Vitals:    05/22/23 1509   Temp: 96.5 °F (35.8 °C)   Weight: 102 kg (224 lb)   Height: 175.3 cm (69.02\")     Body mass index is 33.06 kg/m².  Physical Exam  Constitutional:       Appearance: Normal appearance. He is obese.   HENT:      Head: Normocephalic and atraumatic.      Ears:      Comments: Fluid noted behind right TM. LEAC with " non-obstructing cerumen.      Nose: Congestion present.      Mouth/Throat:      Mouth: Mucous membranes are moist.      Pharynx: Oropharynx is clear. No oropharyngeal exudate or posterior oropharyngeal erythema.      Comments: +PND.   Pulmonary:      Effort: Pulmonary effort is normal.   Musculoskeletal:      Cervical back: Neck supple.   Lymphadenopathy:      Cervical: No cervical adenopathy.   Skin:     General: Skin is warm and dry.   Neurological:      Mental Status: He is alert.   Psychiatric:         Mood and Affect: Mood normal.         Behavior: Behavior normal.         Procedures    Assessment & Plan   Diagnoses and all orders for this visit:    1. Sensation of fullness in both ears (Primary)  Comments:  He has used Afrin for several days without improvement. He demos incorrect use of Flonase. Retrained. He is scheduled with ENT in June.     2. Acute non-recurrent sinusitis, unspecified location  Comments:  Sinus drainage is clear. Understands to call should this change. Mucinex advised.   Orders:  -     guaiFENesin (Mucinex) 600 MG 12 hr tablet; Take 2 tablets by mouth 2 (Two) Times a Day.    Records reviewed include previous OV with myself as well as labs.     Return for Next scheduled follow up.

## 2023-06-07 DIAGNOSIS — E03.8 OTHER SPECIFIED HYPOTHYROIDISM: Chronic | ICD-10-CM

## 2023-06-07 RX ORDER — LEVOTHYROXINE SODIUM 150 MCG
TABLET ORAL
Qty: 30 TABLET | Refills: 11 | Status: SHIPPED | OUTPATIENT
Start: 2023-06-07

## 2023-08-06 DIAGNOSIS — I10 ESSENTIAL HYPERTENSION: Chronic | ICD-10-CM

## 2023-08-07 RX ORDER — HYDROCHLOROTHIAZIDE 12.5 MG/1
TABLET ORAL
Qty: 30 TABLET | Refills: 3 | Status: SHIPPED | OUTPATIENT
Start: 2023-08-07

## 2023-08-30 DIAGNOSIS — F41.9 ANXIETY: ICD-10-CM

## 2023-08-30 RX ORDER — BUSPIRONE HYDROCHLORIDE 10 MG/1
TABLET ORAL
Qty: 90 TABLET | Refills: 1 | Status: SHIPPED | OUTPATIENT
Start: 2023-08-30

## 2023-09-18 ENCOUNTER — OFFICE VISIT (OUTPATIENT)
Dept: INTERNAL MEDICINE | Facility: CLINIC | Age: 58
End: 2023-09-18
Payer: COMMERCIAL

## 2023-09-18 VITALS
HEIGHT: 69 IN | TEMPERATURE: 97.1 F | BODY MASS INDEX: 33.24 KG/M2 | WEIGHT: 224.4 LBS | SYSTOLIC BLOOD PRESSURE: 130 MMHG | DIASTOLIC BLOOD PRESSURE: 80 MMHG

## 2023-09-18 DIAGNOSIS — E03.8 OTHER SPECIFIED HYPOTHYROIDISM: Primary | Chronic | ICD-10-CM

## 2023-09-18 DIAGNOSIS — Z23 NEED FOR INFLUENZA VACCINATION: ICD-10-CM

## 2023-09-18 DIAGNOSIS — N28.9 MILD RENAL INSUFFICIENCY: ICD-10-CM

## 2023-09-18 DIAGNOSIS — E11.9 TYPE 2 DIABETES MELLITUS WITHOUT COMPLICATION, WITHOUT LONG-TERM CURRENT USE OF INSULIN: ICD-10-CM

## 2023-09-18 PROCEDURE — 90471 IMMUNIZATION ADMIN: CPT | Performed by: INTERNAL MEDICINE

## 2023-09-18 PROCEDURE — 99214 OFFICE O/P EST MOD 30 MIN: CPT | Performed by: INTERNAL MEDICINE

## 2023-09-18 PROCEDURE — 90686 IIV4 VACC NO PRSV 0.5 ML IM: CPT | Performed by: INTERNAL MEDICINE

## 2023-09-18 RX ORDER — LEVOTHYROXINE SODIUM 137 UG/1
137 TABLET ORAL DAILY
Qty: 30 TABLET | Refills: 2 | Status: SHIPPED | OUTPATIENT
Start: 2023-09-18

## 2023-09-18 NOTE — PROGRESS NOTES
Subjective        Chief Complaint   Patient presents with    Hypothyroidism           Beni Kunz is a 58 y.o. male who presents for    Patient Active Problem List   Diagnosis    Other hyperlipidemia    Essential hypertension    Other specified hypothyroidism    Type 2 diabetes mellitus without complication, without long-term current use of insulin    Alkaline phosphatase elevation    Tubular adenoma of colon    ROYEC on CPAP    Class 1 obesity    Mild renal insufficiency       History of Present Illness     He got a steroid injection in both heels; it has helped some. His BP has been 128/80. His sugars have been 148. He saw ENT and he was having eustachian tube dysfunction; he was started on steroids and it helped. He has seen Dr. Gonzalez and he decided not to get an oral appliance now with costs. He has felt a faster HR. He is not feeling warm.    No Known Allergies    Current Outpatient Medications on File Prior to Visit   Medication Sig Dispense Refill    atenolol (TENORMIN) 100 MG tablet TAKE 1 TABLET BY MOUTH EVERY DAY 30 tablet 3    atorvastatin (LIPITOR) 20 MG tablet TAKE 1 TABLET BY MOUTH EVERY DAY 30 tablet 5    busPIRone (BUSPAR) 10 MG tablet TAKE 1 TABLET BY MOUTH THREE TIMES A DAY AS NEEDED FOR ANXIETY 90 tablet 1    cetirizine (zyrTEC) 10 MG tablet Take 1 tablet by mouth Daily.      fluticasone (FLONASE) 50 MCG/ACT nasal spray SPRAY 1-2 SPRAYS INTO EACH NOSTRIL EVERY DAY  11    hydroCHLOROthiazide (HYDRODIURIL) 12.5 MG tablet TAKE 1 TABLET BY MOUTH EVERY DAY 30 tablet 3    losartan (COZAAR) 50 MG tablet TAKE 1 TABLET BY MOUTH EVERY DAY 30 tablet 3    omeprazole (priLOSEC) 40 MG capsule TAKE 1 CAPSULE BY MOUTH TWICE A DAY 60 capsule 11    [DISCONTINUED] Synthroid 150 MCG tablet TAKE 1 TABLET BY MOUTH EVERY DAY 30 tablet 11    [DISCONTINUED] guaiFENesin (Mucinex) 600 MG 12 hr tablet Take 2 tablets by mouth 2 (Two) Times a Day. (Patient not taking: Reported on 9/18/2023)       No current  facility-administered medications on file prior to visit.       Past Medical History:   Diagnosis Date    Anxiety     COVID-19 12/2021    Esophagitis     Fatty liver     GERD (gastroesophageal reflux disease)     Hepatitis C antibody positive in blood 09/26/2019    IgA deficiency     Plantar fasciitis, bilateral     Urticaria        Past Surgical History:   Procedure Laterality Date    COLONOSCOPY  05/08/2019    repeat 3 years Dr. Mahendra Michelle    COLONOSCOPY  09/02/2022    repeat in 5 years with Dr. Mahendra Michelle    ESOPHAGEAL DILATATION  2018       Family History   Problem Relation Age of Onset    Cancer Mother         Sinus cancer    Heart disease Mother     Arthritis Father     Heart disease Father     Colon polyps Father     Stroke Father     Hypertension Father     Heart failure Father     Colon cancer Brother     Diabetes Maternal Aunt        Social History     Socioeconomic History    Marital status: Single   Tobacco Use    Smoking status: Never     Passive exposure: Never    Smokeless tobacco: Never   Substance and Sexual Activity    Alcohol use: Yes     Comment: Very rarely drink    Drug use: No    Sexual activity: Yes     Partners: Female     Birth control/protection: Condom           The following portions of the patient's history were reviewed and updated as appropriate: problem list, allergies, current medications, past medical history, past family history, past social history, and past surgical history.    Review of Systems    Immunization History   Administered Date(s) Administered    COVID-19 (PFIZER) Purple Cap Monovalent 04/01/2021, 04/22/2021, 11/07/2021    Fluzone (or Fluarix & Flulaval for VFC) >6mos 09/15/2020, 11/04/2021, 09/19/2022, 09/18/2023    Fluzone Quad >6mos (Multi-dose) 12/05/2017, 11/01/2018    Hepatitis A 01/23/2018, 08/15/2018    Hepatitis B Adult/Adolescent IM 01/23/2018, 08/15/2018, 02/15/2019    Pneumococcal Polysaccharide (PPSV23) 02/15/2019    Tdap 01/01/2010, 06/16/2020    flucelvax  "quad pfs =>4 YRS 09/26/2019       Objective   Vitals:    09/18/23 1517   BP: 130/80   Temp: 97.1 °F (36.2 °C)   TempSrc: Temporal   Weight: 102 kg (224 lb 6.4 oz)   Height: 175.3 cm (69.02\")     Body mass index is 33.12 kg/m².  Physical Exam  Vitals reviewed.   Constitutional:       Appearance: He is well-developed.   HENT:      Head: Normocephalic and atraumatic.   Cardiovascular:      Rate and Rhythm: Normal rate and regular rhythm.      Heart sounds: Normal heart sounds, S1 normal and S2 normal.   Pulmonary:      Effort: Pulmonary effort is normal.      Breath sounds: Normal breath sounds.   Skin:     General: Skin is warm.   Neurological:      Mental Status: He is alert.   Psychiatric:         Behavior: Behavior normal.       Procedures    Assessment & Plan   Diagnoses and all orders for this visit:    1. Other specified hypothyroidism (Primary)  -     TSH; Future  -     levothyroxine (SYNTHROID, LEVOTHROID) 137 MCG tablet; Take 1 tablet by mouth Daily.  Dispense: 30 tablet; Refill: 2  -     T4, Free; Future    2. Mild renal insufficiency  -     Basic Metabolic Panel; Future    3. Type 2 diabetes mellitus without complication, without long-term current use of insulin  -     Hemoglobin A1c; Future    4. Need for influenza vaccination  -     Fluzone (or Fluarix & Flulaval for VFC) >6mos                 Reviewed tsh and bmp. Decrease levoxyl dose.    Return in about 6 weeks (around 10/30/2023) for Lab Before FUP.  "

## 2023-10-04 DIAGNOSIS — E78.5 HYPERLIPIDEMIA, UNSPECIFIED HYPERLIPIDEMIA TYPE: ICD-10-CM

## 2023-10-04 RX ORDER — ATORVASTATIN CALCIUM 20 MG/1
TABLET, FILM COATED ORAL
Qty: 30 TABLET | Refills: 5 | Status: SHIPPED | OUTPATIENT
Start: 2023-10-04

## 2023-10-17 DIAGNOSIS — E11.9 TYPE 2 DIABETES MELLITUS WITHOUT COMPLICATION, WITHOUT LONG-TERM CURRENT USE OF INSULIN: ICD-10-CM

## 2023-10-17 DIAGNOSIS — E03.8 OTHER SPECIFIED HYPOTHYROIDISM: Chronic | ICD-10-CM

## 2023-10-17 DIAGNOSIS — N28.9 MILD RENAL INSUFFICIENCY: ICD-10-CM

## 2023-10-19 DIAGNOSIS — E03.8 OTHER SPECIFIED HYPOTHYROIDISM: Primary | Chronic | ICD-10-CM

## 2023-10-19 LAB
BUN SERPL-MCNC: 15 MG/DL (ref 6–20)
BUN/CREAT SERPL: 13.2 (ref 7–25)
CALCIUM SERPL-MCNC: 10.1 MG/DL (ref 8.6–10.5)
CHLORIDE SERPL-SCNC: 103 MMOL/L (ref 98–107)
CO2 SERPL-SCNC: 27.8 MMOL/L (ref 22–29)
CREAT SERPL-MCNC: 1.14 MG/DL (ref 0.76–1.27)
EGFRCR SERPLBLD CKD-EPI 2021: 74.1 ML/MIN/1.73
GLUCOSE SERPL-MCNC: 122 MG/DL (ref 65–99)
HBA1C MFR BLD: 6.7 % (ref 4.8–5.6)
POTASSIUM SERPL-SCNC: 4.4 MMOL/L (ref 3.5–5.2)
SODIUM SERPL-SCNC: 140 MMOL/L (ref 136–145)
T4 FREE SERPL-MCNC: 1.86 NG/DL (ref 0.93–1.7)
TSH SERPL DL<=0.005 MIU/L-ACNC: 0.09 UIU/ML (ref 0.27–4.2)

## 2023-10-19 RX ORDER — LEVOTHYROXINE SODIUM 112 UG/1
112 TABLET ORAL DAILY
Qty: 30 TABLET | Refills: 2 | Status: SHIPPED | OUTPATIENT
Start: 2023-10-19

## 2023-10-30 ENCOUNTER — HOSPITAL ENCOUNTER (OUTPATIENT)
Facility: HOSPITAL | Age: 58
Discharge: HOME | End: 2023-10-30
Admitting: INTERNAL MEDICINE
Payer: COMMERCIAL

## 2023-10-30 ENCOUNTER — OFFICE VISIT (OUTPATIENT)
Dept: INTERNAL MEDICINE | Facility: CLINIC | Age: 58
End: 2023-10-30
Payer: COMMERCIAL

## 2023-10-30 VITALS
BODY MASS INDEX: 33.71 KG/M2 | DIASTOLIC BLOOD PRESSURE: 82 MMHG | HEIGHT: 69 IN | WEIGHT: 227.6 LBS | SYSTOLIC BLOOD PRESSURE: 132 MMHG

## 2023-10-30 DIAGNOSIS — E11.9 TYPE 2 DIABETES MELLITUS WITHOUT COMPLICATION, WITHOUT LONG-TERM CURRENT USE OF INSULIN: Chronic | ICD-10-CM

## 2023-10-30 DIAGNOSIS — E03.8 OTHER SPECIFIED HYPOTHYROIDISM: Primary | Chronic | ICD-10-CM

## 2023-10-30 DIAGNOSIS — J30.2 SEASONAL ALLERGIES: ICD-10-CM

## 2023-10-30 DIAGNOSIS — G47.33 OSA ON CPAP: ICD-10-CM

## 2023-10-30 DIAGNOSIS — I10 ESSENTIAL HYPERTENSION: Chronic | ICD-10-CM

## 2023-10-30 PROCEDURE — 71046 X-RAY EXAM CHEST 2 VIEWS: CPT

## 2023-10-30 NOTE — PROGRESS NOTES
Subjective        Chief Complaint   Patient presents with    Diabetes           Beni Kunz is a 58 y.o. male who presents for    Patient Active Problem List   Diagnosis    Other hyperlipidemia    Essential hypertension    Other specified hypothyroidism    Type 2 diabetes mellitus without complication, without long-term current use of insulin    Alkaline phosphatase elevation    Tubular adenoma of colon    ROYCE on CPAP    Class 1 obesity    Mild renal insufficiency       History of Present Illness       He has to take a lot of deep breaths with the fall weather. His nose gets stopped up. He can hurt on the left chest. He has not been sneezing. He has congestion in his nose. He is walking daily. He has no problems breathing with walking. His wt is unchanged. He denies heat intol, skin changes or diarrhea. He checks his sugars and they run 136-148. He has not been using his CPAP as it was causing him to be short of breath.  No Known Allergies    Current Outpatient Medications on File Prior to Visit   Medication Sig Dispense Refill    atenolol (TENORMIN) 100 MG tablet TAKE 1 TABLET BY MOUTH EVERY DAY 30 tablet 3    atorvastatin (LIPITOR) 20 MG tablet TAKE 1 TABLET BY MOUTH EVERY DAY 30 tablet 5    busPIRone (BUSPAR) 10 MG tablet TAKE 1 TABLET BY MOUTH THREE TIMES A DAY AS NEEDED FOR ANXIETY 90 tablet 1    cetirizine (zyrTEC) 10 MG tablet Take 1 tablet by mouth Daily.      fluticasone (FLONASE) 50 MCG/ACT nasal spray SPRAY 1-2 SPRAYS INTO EACH NOSTRIL EVERY DAY  11    hydroCHLOROthiazide (HYDRODIURIL) 12.5 MG tablet TAKE 1 TABLET BY MOUTH EVERY DAY 30 tablet 3    levothyroxine (Synthroid) 112 MCG tablet Take 1 tablet by mouth Daily. 30 tablet 2    losartan (COZAAR) 50 MG tablet TAKE 1 TABLET BY MOUTH EVERY DAY 30 tablet 3    omeprazole (priLOSEC) 40 MG capsule TAKE 1 CAPSULE BY MOUTH TWICE A DAY 60 capsule 11     No current facility-administered medications on file prior to visit.       Past Medical History:    Diagnosis Date    Anxiety     COVID-19 12/2021    Esophagitis     Fatty liver     GERD (gastroesophageal reflux disease)     Hepatitis C antibody positive in blood 09/26/2019    IgA deficiency     Plantar fasciitis, bilateral     Urticaria        Past Surgical History:   Procedure Laterality Date    COLONOSCOPY  05/08/2019    repeat 3 years Dr. Mahendra Michelle    COLONOSCOPY  09/02/2022    repeat in 5 years with Dr. Mahendra Michelle    ESOPHAGEAL DILATATION  2018       Family History   Problem Relation Age of Onset    Cancer Mother         Sinus cancer    Heart disease Mother     Arthritis Father     Heart disease Father     Colon polyps Father     Stroke Father     Hypertension Father     Heart failure Father     Colon cancer Brother     Diabetes Maternal Aunt        Social History     Socioeconomic History    Marital status: Single   Tobacco Use    Smoking status: Never     Passive exposure: Never    Smokeless tobacco: Never   Vaping Use    Vaping Use: Never used   Substance and Sexual Activity    Alcohol use: Yes     Comment: Very rarely drink    Drug use: No    Sexual activity: Yes     Partners: Female     Birth control/protection: Condom           The following portions of the patient's history were reviewed and updated as appropriate: problem list, allergies, current medications, past medical history, past family history, past social history, and past surgical history.    Review of Systems    Immunization History   Administered Date(s) Administered    COVID-19 (PFIZER) Purple Cap Monovalent 04/01/2021, 04/22/2021, 11/07/2021    Fluzone (or Fluarix & Flulaval for VFC) >6mos 09/15/2020, 11/04/2021, 09/19/2022, 09/18/2023    Fluzone Quad >6mos (Multi-dose) 12/05/2017, 11/01/2018    Hepatitis A 01/23/2018, 08/15/2018    Hepatitis B Adult/Adolescent IM 01/23/2018, 08/15/2018, 02/15/2019    Pneumococcal Polysaccharide (PPSV23) 02/15/2019    Tdap 01/01/2010, 06/16/2020    flucelvax quad pfs =>4 YRS 09/26/2019       Objective  "  Vitals:    10/30/23 1115   BP: 132/82   Weight: 103 kg (227 lb 9.6 oz)   Height: 175.3 cm (69.02\")     Body mass index is 33.6 kg/m².  Physical Exam  Vitals reviewed.   Constitutional:       Appearance: He is well-developed.   HENT:      Head: Normocephalic and atraumatic.      Mouth/Throat:      Mouth: Mucous membranes are moist.      Pharynx: Oropharynx is clear.   Cardiovascular:      Rate and Rhythm: Normal rate and regular rhythm.      Heart sounds: Normal heart sounds, S1 normal and S2 normal.   Pulmonary:      Effort: Pulmonary effort is normal.      Breath sounds: Normal breath sounds.   Skin:     General: Skin is warm.   Neurological:      Mental Status: He is alert.   Psychiatric:         Behavior: Behavior normal.         Procedures    Assessment & Plan   Diagnoses and all orders for this visit:    1. Other specified hypothyroidism (Primary)  Comments:  I decreased dose about a week ago  Orders:  -     T4, Free; Future  -     TSH; Future    2. Type 2 diabetes mellitus without complication, without long-term current use of insulin  Comments:  a1c is creeping up. Discussed eating healthier    3. Essential hypertension  Comments:  on upper limit nl    4. ROYCE on CPAP  Comments:  Recc r/s mask    5. Seasonal allergies  Comments:  [facility] will call [facility]. Get CXR with pain on left side  Orders:  -     XR Chest PA & Lateral                   Return in about 6 weeks (around 12/11/2023) for Lab Before FUP.  "

## 2023-10-31 DIAGNOSIS — I10 ESSENTIAL HYPERTENSION: Chronic | ICD-10-CM

## 2023-10-31 DIAGNOSIS — F41.9 ANXIETY: ICD-10-CM

## 2023-10-31 RX ORDER — BUSPIRONE HYDROCHLORIDE 10 MG/1
TABLET ORAL
Qty: 90 TABLET | Refills: 1 | Status: SHIPPED | OUTPATIENT
Start: 2023-10-31

## 2023-10-31 RX ORDER — LOSARTAN POTASSIUM 50 MG/1
TABLET ORAL
Qty: 30 TABLET | Refills: 3 | Status: SHIPPED | OUTPATIENT
Start: 2023-10-31

## 2023-10-31 RX ORDER — ATENOLOL 100 MG/1
TABLET ORAL
Qty: 30 TABLET | Refills: 3 | Status: SHIPPED | OUTPATIENT
Start: 2023-10-31

## 2023-12-01 DIAGNOSIS — I10 ESSENTIAL HYPERTENSION: Chronic | ICD-10-CM

## 2023-12-01 RX ORDER — HYDROCHLOROTHIAZIDE 12.5 MG/1
TABLET ORAL
Qty: 30 TABLET | Refills: 3 | Status: SHIPPED | OUTPATIENT
Start: 2023-12-01

## 2023-12-06 DIAGNOSIS — E03.8 OTHER SPECIFIED HYPOTHYROIDISM: Chronic | ICD-10-CM

## 2023-12-07 LAB
T4 FREE SERPL-MCNC: 1.63 NG/DL (ref 0.93–1.7)
TSH SERPL DL<=0.005 MIU/L-ACNC: 0.68 UIU/ML (ref 0.27–4.2)

## 2023-12-11 ENCOUNTER — OFFICE VISIT (OUTPATIENT)
Dept: INTERNAL MEDICINE | Facility: CLINIC | Age: 58
End: 2023-12-11
Payer: COMMERCIAL

## 2023-12-11 VITALS
SYSTOLIC BLOOD PRESSURE: 114 MMHG | HEIGHT: 69 IN | WEIGHT: 229.6 LBS | DIASTOLIC BLOOD PRESSURE: 80 MMHG | BODY MASS INDEX: 34 KG/M2

## 2023-12-11 DIAGNOSIS — E11.9 TYPE 2 DIABETES MELLITUS WITHOUT COMPLICATION, WITHOUT LONG-TERM CURRENT USE OF INSULIN: Chronic | ICD-10-CM

## 2023-12-11 DIAGNOSIS — E03.8 OTHER SPECIFIED HYPOTHYROIDISM: Chronic | ICD-10-CM

## 2023-12-11 DIAGNOSIS — I10 ESSENTIAL HYPERTENSION: Primary | Chronic | ICD-10-CM

## 2023-12-11 DIAGNOSIS — H93.8X2 ABNORMAL SENSATION IN LEFT EAR: ICD-10-CM

## 2023-12-11 NOTE — PROGRESS NOTES
Subjective        Chief Complaint   Patient presents with    Diabetes           Beni Kunz is a 58 y.o. male who presents for    Patient Active Problem List   Diagnosis    Other hyperlipidemia    Essential hypertension    Other specified hypothyroidism    Type 2 diabetes mellitus without complication, without long-term current use of insulin    Alkaline phosphatase elevation    Tubular adenoma of colon    ROYCE on CPAP    Class 1 obesity    Mild renal insufficiency       History of Present Illness       His left ear crunches when he lays on it. He denies hearing loss. His BP was 128/78 two weeks. He has been checking his sugars. It was 189 this am.   No Known Allergies    Current Outpatient Medications on File Prior to Visit   Medication Sig Dispense Refill    atenolol (TENORMIN) 100 MG tablet TAKE 1 TABLET BY MOUTH EVERY DAY 30 tablet 3    atorvastatin (LIPITOR) 20 MG tablet TAKE 1 TABLET BY MOUTH EVERY DAY 30 tablet 5    busPIRone (BUSPAR) 10 MG tablet TAKE 1 TABLET BY MOUTH THREE TIMES A DAY AS NEEDED FOR ANXIETY 90 tablet 1    cetirizine (zyrTEC) 10 MG tablet Take 1 tablet by mouth Daily.      fluticasone (FLONASE) 50 MCG/ACT nasal spray SPRAY 1-2 SPRAYS INTO EACH NOSTRIL EVERY DAY  11    hydroCHLOROthiazide (HYDRODIURIL) 12.5 MG tablet TAKE 1 TABLET BY MOUTH EVERY DAY 30 tablet 3    levothyroxine (Synthroid) 112 MCG tablet Take 1 tablet by mouth Daily. 30 tablet 2    losartan (COZAAR) 50 MG tablet TAKE 1 TABLET BY MOUTH EVERY DAY 30 tablet 3    omeprazole (priLOSEC) 40 MG capsule TAKE 1 CAPSULE BY MOUTH TWICE A DAY 60 capsule 11     No current facility-administered medications on file prior to visit.       Past Medical History:   Diagnosis Date    Anxiety     COVID-19 12/2021    Esophagitis     Fatty liver     GERD (gastroesophageal reflux disease)     Hepatitis C antibody positive in blood 09/26/2019    IgA deficiency     Plantar fasciitis, bilateral     Urticaria        Past Surgical History:   Procedure  "Laterality Date    COLONOSCOPY  05/08/2019    repeat 3 years Dr. Mahendra Michelle    COLONOSCOPY  09/02/2022    repeat in 5 years with Dr. Mahendra Michelle    ESOPHAGEAL DILATATION  2018       Family History   Problem Relation Age of Onset    Cancer Mother         Sinus cancer    Heart disease Mother     Arthritis Father     Heart disease Father     Colon polyps Father     Stroke Father     Hypertension Father     Heart failure Father     Colon cancer Brother     Diabetes Maternal Aunt        Social History     Socioeconomic History    Marital status: Single   Tobacco Use    Smoking status: Never     Passive exposure: Never    Smokeless tobacco: Never   Vaping Use    Vaping Use: Never used   Substance and Sexual Activity    Alcohol use: Yes     Comment: Very rarely drink    Drug use: No    Sexual activity: Yes     Partners: Female     Birth control/protection: Condom           The following portions of the patient's history were reviewed and updated as appropriate: problem list, allergies, current medications, past medical history, past family history, past social history, and past surgical history.    Review of Systems    Immunization History   Administered Date(s) Administered    COVID-19 (PFIZER) Purple Cap Monovalent 04/01/2021, 04/22/2021, 11/07/2021    Fluzone (or Fluarix & Flulaval for VFC) >6mos 09/15/2020, 11/04/2021, 09/19/2022, 09/18/2023    Fluzone Quad >6mos (Multi-dose) 12/05/2017, 11/01/2018    Hepatitis A 01/23/2018, 08/15/2018    Hepatitis B Adult/Adolescent IM 01/23/2018, 08/15/2018, 02/15/2019    Pneumococcal Polysaccharide (PPSV23) 02/15/2019    Tdap 01/01/2010, 06/16/2020    flucelvax quad pfs =>4 YRS 09/26/2019       Objective   Vitals:    12/11/23 1047   BP: 114/80   Weight: 104 kg (229 lb 9.6 oz)   Height: 175.3 cm (69.02\")     Body mass index is 33.89 kg/m².  Physical Exam  Vitals reviewed.   Constitutional:       Appearance: He is well-developed.   HENT:      Head: Normocephalic and atraumatic.      Right " Ear: Hearing and tympanic membrane normal.      Left Ear: Hearing and tympanic membrane normal.   Cardiovascular:      Rate and Rhythm: Normal rate and regular rhythm.      Heart sounds: Normal heart sounds, S1 normal and S2 normal.   Pulmonary:      Effort: Pulmonary effort is normal.      Breath sounds: Normal breath sounds.   Skin:     General: Skin is warm.   Neurological:      Mental Status: He is alert.   Psychiatric:         Behavior: Behavior normal.         Procedures    Assessment & Plan   Diagnoses and all orders for this visit:    1. Essential hypertension (Primary)  Comments:  BP is good  Orders:  -     Comprehensive Metabolic Panel; Future  -     Lipid Panel With / Chol / HDL Ratio; Future    2. Other specified hypothyroidism  -     TSH; Future    3. Type 2 diabetes mellitus without complication, without long-term current use of insulin  Comments:  Discussed exercising and losing wt  Orders:  -     Hemoglobin A1c; Future  -     Microalbumin / Creatinine Urine Ratio - Urine, Clean Catch; Future    4. Abnormal sensation in left ear  Comments:  He will call Dr. Mar if continues               TSH and FT4 nl.    Return in about 4 months (around 4/11/2024) for Annual physical, Lab Before FUP.

## 2023-12-27 DIAGNOSIS — E03.8 OTHER SPECIFIED HYPOTHYROIDISM: Chronic | ICD-10-CM

## 2023-12-27 DIAGNOSIS — I10 ESSENTIAL HYPERTENSION: Chronic | ICD-10-CM

## 2023-12-28 DIAGNOSIS — I10 ESSENTIAL HYPERTENSION: Chronic | ICD-10-CM

## 2023-12-28 RX ORDER — LOSARTAN POTASSIUM 50 MG/1
50 TABLET ORAL DAILY
Qty: 30 TABLET | Refills: 3 | Status: SHIPPED | OUTPATIENT
Start: 2023-12-28

## 2023-12-28 RX ORDER — LOSARTAN POTASSIUM 100 MG/1
TABLET ORAL
Qty: 30 TABLET | Refills: 5 | OUTPATIENT
Start: 2023-12-28

## 2023-12-28 RX ORDER — LEVOTHYROXINE SODIUM 112 UG/1
112 TABLET ORAL DAILY
Qty: 30 TABLET | Refills: 2 | Status: SHIPPED | OUTPATIENT
Start: 2023-12-28

## 2024-01-24 DIAGNOSIS — E03.8 OTHER SPECIFIED HYPOTHYROIDISM: Chronic | ICD-10-CM

## 2024-01-24 RX ORDER — LEVOTHYROXINE SODIUM 137 UG/1
137 TABLET ORAL DAILY
Qty: 30 TABLET | Refills: 2 | OUTPATIENT
Start: 2024-01-24

## 2024-02-06 DIAGNOSIS — I10 ESSENTIAL HYPERTENSION: Chronic | ICD-10-CM

## 2024-02-06 DIAGNOSIS — F41.9 ANXIETY: ICD-10-CM

## 2024-02-08 RX ORDER — BUSPIRONE HYDROCHLORIDE 10 MG/1
TABLET ORAL
Qty: 90 TABLET | Refills: 1 | Status: SHIPPED | OUTPATIENT
Start: 2024-02-08

## 2024-02-08 RX ORDER — LOSARTAN POTASSIUM 50 MG/1
50 TABLET ORAL DAILY
Qty: 30 TABLET | Refills: 2 | Status: SHIPPED | OUTPATIENT
Start: 2024-02-08

## 2024-03-03 DIAGNOSIS — I10 ESSENTIAL HYPERTENSION: Chronic | ICD-10-CM

## 2024-03-04 RX ORDER — ATENOLOL 100 MG/1
TABLET ORAL
Qty: 30 TABLET | Refills: 3 | Status: SHIPPED | OUTPATIENT
Start: 2024-03-04

## 2024-03-04 RX ORDER — OMEPRAZOLE 40 MG/1
CAPSULE, DELAYED RELEASE ORAL
Qty: 60 CAPSULE | Refills: 11 | Status: SHIPPED | OUTPATIENT
Start: 2024-03-04

## 2024-03-30 DIAGNOSIS — E78.5 HYPERLIPIDEMIA, UNSPECIFIED HYPERLIPIDEMIA TYPE: ICD-10-CM

## 2024-03-30 DIAGNOSIS — I10 ESSENTIAL HYPERTENSION: Chronic | ICD-10-CM

## 2024-04-01 RX ORDER — HYDROCHLOROTHIAZIDE 12.5 MG/1
TABLET ORAL
Qty: 30 TABLET | Refills: 3 | Status: SHIPPED | OUTPATIENT
Start: 2024-04-01

## 2024-04-01 RX ORDER — ATORVASTATIN CALCIUM 20 MG/1
TABLET, FILM COATED ORAL
Qty: 30 TABLET | Refills: 5 | Status: SHIPPED | OUTPATIENT
Start: 2024-04-01

## 2024-04-22 ENCOUNTER — OFFICE VISIT (OUTPATIENT)
Dept: INTERNAL MEDICINE | Facility: CLINIC | Age: 59
End: 2024-04-22
Payer: COMMERCIAL

## 2024-04-22 VITALS
WEIGHT: 233.8 LBS | SYSTOLIC BLOOD PRESSURE: 124 MMHG | DIASTOLIC BLOOD PRESSURE: 82 MMHG | HEIGHT: 69 IN | BODY MASS INDEX: 34.63 KG/M2

## 2024-04-22 DIAGNOSIS — Z00.00 WELLNESS EXAMINATION: Primary | ICD-10-CM

## 2024-04-22 DIAGNOSIS — G47.33 OSA (OBSTRUCTIVE SLEEP APNEA): ICD-10-CM

## 2024-04-22 DIAGNOSIS — E78.49 OTHER HYPERLIPIDEMIA: ICD-10-CM

## 2024-04-22 DIAGNOSIS — E03.8 OTHER SPECIFIED HYPOTHYROIDISM: Chronic | ICD-10-CM

## 2024-04-22 DIAGNOSIS — K76.0 FATTY LIVER: ICD-10-CM

## 2024-04-22 DIAGNOSIS — I10 ESSENTIAL HYPERTENSION: Chronic | ICD-10-CM

## 2024-04-22 DIAGNOSIS — E11.9 TYPE 2 DIABETES MELLITUS WITHOUT COMPLICATION, WITHOUT LONG-TERM CURRENT USE OF INSULIN: Chronic | ICD-10-CM

## 2024-04-22 PROCEDURE — 99396 PREV VISIT EST AGE 40-64: CPT | Performed by: INTERNAL MEDICINE

## 2024-04-22 RX ORDER — BLOOD-GLUCOSE SENSOR
1 EACH MISCELLANEOUS
Qty: 2 EACH | Refills: 2 | Status: SHIPPED | OUTPATIENT
Start: 2024-04-22

## 2024-04-22 NOTE — PROGRESS NOTES
Subjective        Chief Complaint   Patient presents with    Annual Exam           Beni Kunz is a 58 y.o. male who presents for    Patient Active Problem List   Diagnosis    Other hyperlipidemia    Essential hypertension    Other specified hypothyroidism    Type 2 diabetes mellitus without complication, without long-term current use of insulin    Alkaline phosphatase elevation    Class 1 obesity    Mild renal insufficiency    ROYCE (obstructive sleep apnea)       History of Present Illness   His am sugar runs 180 before eating. He does not snack at night. His BP runs 132/80. He walks daily. He denies chest pain. He is doing well emotionally.      No Known Allergies  BMI is >= 30 and <35. (Class 1 Obesity). The following options were offered after discussion;: exercise counseling/recommendations   Current Outpatient Medications on File Prior to Visit   Medication Sig Dispense Refill    atenolol (TENORMIN) 100 MG tablet TAKE 1 TABLET BY MOUTH EVERY DAY 30 tablet 3    atorvastatin (LIPITOR) 20 MG tablet TAKE 1 TABLET BY MOUTH EVERY DAY 30 tablet 5    busPIRone (BUSPAR) 10 MG tablet TAKE 1 TABLET BY MOUTH THREE TIMES A DAY AS NEEDED FOR ANXIETY 90 tablet 1    cetirizine (zyrTEC) 10 MG tablet Take 1 tablet by mouth Daily.      fluticasone (FLONASE) 50 MCG/ACT nasal spray SPRAY 1-2 SPRAYS INTO EACH NOSTRIL EVERY DAY  11    hydroCHLOROthiazide 12.5 MG tablet TAKE 1 TABLET BY MOUTH EVERY DAY 30 tablet 3    levothyroxine (SYNTHROID, LEVOTHROID) 112 MCG tablet TAKE 1 TABLET BY MOUTH EVERY DAY 30 tablet 2    losartan (Cozaar) 50 MG tablet Take 1 tablet by mouth Daily. 30 tablet 2    omeprazole (priLOSEC) 40 MG capsule TAKE 1 CAPSULE BY MOUTH TWICE A DAY 60 capsule 11     No current facility-administered medications on file prior to visit.       Past Medical History:   Diagnosis Date    Anxiety     COVID-19 12/2021    Esophagitis     Fatty liver     GERD (gastroesophageal reflux disease)     Hepatitis C antibody positive in  "blood 09/26/2019    IgA deficiency     Plantar fasciitis, bilateral     Urticaria        Past Surgical History:   Procedure Laterality Date    COLONOSCOPY  05/08/2019    repeat 3 years Dr. Mahendra Michelle    COLONOSCOPY  09/02/2022    repeat in 5 years with Dr. Mahendra Michelle    ESOPHAGEAL DILATATION  2018       Family History   Problem Relation Age of Onset    Cancer Mother         Sinus cancer    Heart disease Mother     Arthritis Father     Heart disease Father     Colon polyps Father     Stroke Father     Hypertension Father     Heart failure Father     Colon cancer Brother     Diabetes Maternal Aunt        Social History     Socioeconomic History    Marital status: Single   Tobacco Use    Smoking status: Never     Passive exposure: Never    Smokeless tobacco: Never   Vaping Use    Vaping status: Never Used   Substance and Sexual Activity    Alcohol use: Yes     Comment: Very rarely drink    Drug use: No    Sexual activity: Yes     Partners: Female     Birth control/protection: Condom           The following portions of the patient's history were reviewed and updated as appropriate: problem list, allergies, current medications, past medical history, past family history, past social history, and past surgical history.    Review of Systems    Immunization History   Administered Date(s) Administered    COVID-19 (PFIZER) Purple Cap Monovalent 04/01/2021, 04/22/2021, 11/07/2021    Fluzone (or Fluarix & Flulaval for VFC) >6mos 09/15/2020, 11/04/2021, 09/19/2022, 09/18/2023    Fluzone Quad >6mos (Multi-dose) 12/05/2017, 11/01/2018    Hepatitis A 01/23/2018, 08/15/2018    Hepatitis B Adult/Adolescent IM 01/23/2018, 08/15/2018, 02/15/2019    Pneumococcal Polysaccharide (PPSV23) 02/15/2019    Tdap 01/01/2010, 06/16/2020    flucelvax quad pfs =>4 YRS 09/26/2019       Objective   Vitals:    04/22/24 0953   BP: 124/82   Weight: 106 kg (233 lb 12.8 oz)   Height: 175.3 cm (69.02\")     Body mass index is 34.51 kg/m².  Physical Exam  Vitals " reviewed.   Constitutional:       Appearance: He is well-developed.   HENT:      Head: Normocephalic and atraumatic.      Mouth/Throat:      Mouth: Mucous membranes are moist.      Pharynx: Oropharynx is clear.   Eyes:      Extraocular Movements: Extraocular movements intact.      Conjunctiva/sclera: Conjunctivae normal.      Pupils: Pupils are equal, round, and reactive to light.   Neck:      Thyroid: No thyromegaly.      Vascular: No carotid bruit.   Cardiovascular:      Rate and Rhythm: Normal rate and regular rhythm.      Heart sounds: Normal heart sounds. No murmur heard.  Pulmonary:      Effort: Pulmonary effort is normal.      Breath sounds: Normal breath sounds.   Abdominal:      General: There is no distension.      Palpations: Abdomen is soft. There is no mass.      Tenderness: There is no abdominal tenderness. There is no rebound.   Musculoskeletal:      Cervical back: Neck supple.   Feet:      Right foot:      Protective Sensation: 5 sites tested.  5 sites sensed.      Skin integrity: Skin integrity normal.      Left foot:      Protective Sensation: 5 sites tested.  5 sites sensed.      Skin integrity: Skin integrity normal.   Lymphadenopathy:      Cervical: No cervical adenopathy.   Skin:     General: Skin is warm.   Neurological:      Mental Status: He is alert.   Psychiatric:         Behavior: Behavior normal.         Procedures    Assessment & Plan   Diagnoses and all orders for this visit:    1. Wellness examination (Primary)    2. ROYCE (obstructive sleep apnea)  Comments:  He was not able to tolerate CPAP. He has seen dentist for MAD. Recc wt loss.    3. Other specified hypothyroidism  -     TSH; Future    4. Other hyperlipidemia  Comments:  LDL at goal    5. Essential hypertension  -     Comprehensive Metabolic Panel; Future    6. Type 2 diabetes mellitus without complication, without long-term current use of insulin  -     Hemoglobin A1c; Future  -     Continuous Blood Gluc Sensor (FreeStyle Soham  3 Sensor) misc; Use 1 each Every 14 (Fourteen) Days.  Dispense: 2 each; Refill: 2    7. Fatty liver  -     CBC & Differential; Future                 Reviewed labs. Recc exercise 150 minutes per week and shingrix. He has an eye appt set up. Discussed decreasing portions and decreasing calories. Trial CGM to see if can't decrease A1c a little more. Sample of faby 3 given.  Return in about 6 months (around 10/22/2024) for Lab Before FUP.

## 2024-04-30 DIAGNOSIS — F41.9 ANXIETY: ICD-10-CM

## 2024-04-30 RX ORDER — BUSPIRONE HYDROCHLORIDE 10 MG/1
TABLET ORAL
Qty: 90 TABLET | Refills: 1 | Status: SHIPPED | OUTPATIENT
Start: 2024-04-30

## 2024-05-28 DIAGNOSIS — F41.9 ANXIETY: ICD-10-CM

## 2024-05-29 RX ORDER — BUSPIRONE HYDROCHLORIDE 10 MG/1
10 TABLET ORAL 3 TIMES DAILY PRN
Qty: 90 TABLET | Refills: 1 | Status: SHIPPED | OUTPATIENT
Start: 2024-05-29

## 2024-06-08 DIAGNOSIS — E03.8 OTHER SPECIFIED HYPOTHYROIDISM: Chronic | ICD-10-CM

## 2024-06-10 RX ORDER — LEVOTHYROXINE SODIUM 112 UG/1
112 TABLET ORAL DAILY
Qty: 90 TABLET | Refills: 2 | Status: SHIPPED | OUTPATIENT
Start: 2024-06-10

## 2024-07-02 DIAGNOSIS — F41.9 ANXIETY: ICD-10-CM

## 2024-07-02 DIAGNOSIS — I10 ESSENTIAL HYPERTENSION: Chronic | ICD-10-CM

## 2024-07-02 RX ORDER — ATENOLOL 100 MG/1
TABLET ORAL
Qty: 30 TABLET | Refills: 3 | Status: SHIPPED | OUTPATIENT
Start: 2024-07-02

## 2024-07-02 RX ORDER — BUSPIRONE HYDROCHLORIDE 10 MG/1
10 TABLET ORAL 3 TIMES DAILY PRN
Qty: 90 TABLET | Refills: 1 | Status: SHIPPED | OUTPATIENT
Start: 2024-07-02

## 2024-07-26 DIAGNOSIS — E03.8 OTHER SPECIFIED HYPOTHYROIDISM: Chronic | ICD-10-CM

## 2024-07-26 DIAGNOSIS — I10 ESSENTIAL HYPERTENSION: Chronic | ICD-10-CM

## 2024-07-29 RX ORDER — HYDROCHLOROTHIAZIDE 12.5 MG/1
TABLET ORAL
Qty: 30 TABLET | Refills: 3 | Status: SHIPPED | OUTPATIENT
Start: 2024-07-29

## 2024-07-29 RX ORDER — LEVOTHYROXINE SODIUM 112 UG/1
112 TABLET ORAL DAILY
Qty: 30 TABLET | Refills: 11 | Status: SHIPPED | OUTPATIENT
Start: 2024-07-29

## 2024-08-12 DIAGNOSIS — F41.9 ANXIETY: ICD-10-CM

## 2024-08-12 RX ORDER — BUSPIRONE HYDROCHLORIDE 10 MG/1
10 TABLET ORAL 3 TIMES DAILY
Qty: 90 TABLET | Refills: 1 | Status: SHIPPED | OUTPATIENT
Start: 2024-08-12

## 2024-09-04 DIAGNOSIS — F41.9 ANXIETY: ICD-10-CM

## 2024-09-04 RX ORDER — BUSPIRONE HYDROCHLORIDE 10 MG/1
10 TABLET ORAL 3 TIMES DAILY
Qty: 90 TABLET | Refills: 1 | Status: SHIPPED | OUTPATIENT
Start: 2024-09-04 | End: 2024-09-06 | Stop reason: SDUPTHER

## 2024-09-06 DIAGNOSIS — F41.9 ANXIETY: ICD-10-CM

## 2024-09-06 RX ORDER — BUSPIRONE HYDROCHLORIDE 10 MG/1
10 TABLET ORAL 3 TIMES DAILY
Qty: 90 TABLET | Refills: 1 | Status: SHIPPED | OUTPATIENT
Start: 2024-09-06

## 2024-09-26 DIAGNOSIS — I10 ESSENTIAL HYPERTENSION: Chronic | ICD-10-CM

## 2024-09-26 DIAGNOSIS — E78.5 HYPERLIPIDEMIA, UNSPECIFIED HYPERLIPIDEMIA TYPE: ICD-10-CM

## 2024-09-26 RX ORDER — ATORVASTATIN CALCIUM 20 MG/1
20 TABLET, FILM COATED ORAL DAILY
Qty: 90 TABLET | Refills: 2 | Status: SHIPPED | OUTPATIENT
Start: 2024-09-26

## 2024-09-26 RX ORDER — HYDROCHLOROTHIAZIDE 12.5 MG/1
12.5 TABLET ORAL DAILY
Qty: 90 TABLET | Refills: 2 | Status: SHIPPED | OUTPATIENT
Start: 2024-09-26

## 2024-09-26 RX ORDER — LOSARTAN POTASSIUM 50 MG/1
50 TABLET ORAL DAILY
Qty: 90 TABLET | Refills: 1 | Status: SHIPPED | OUTPATIENT
Start: 2024-09-26

## 2024-09-26 RX ORDER — ATENOLOL 100 MG/1
100 TABLET ORAL DAILY
Qty: 90 TABLET | Refills: 2 | Status: SHIPPED | OUTPATIENT
Start: 2024-09-26

## 2024-10-29 ENCOUNTER — OFFICE VISIT (OUTPATIENT)
Dept: INTERNAL MEDICINE | Facility: CLINIC | Age: 59
End: 2024-10-29
Payer: COMMERCIAL

## 2024-10-29 VITALS
HEART RATE: 60 BPM | HEIGHT: 69 IN | SYSTOLIC BLOOD PRESSURE: 120 MMHG | DIASTOLIC BLOOD PRESSURE: 82 MMHG | WEIGHT: 232 LBS | BODY MASS INDEX: 34.36 KG/M2

## 2024-10-29 DIAGNOSIS — N28.9 MILD RENAL INSUFFICIENCY: ICD-10-CM

## 2024-10-29 DIAGNOSIS — E11.9 TYPE 2 DIABETES MELLITUS WITHOUT COMPLICATION, WITHOUT LONG-TERM CURRENT USE OF INSULIN: Chronic | ICD-10-CM

## 2024-10-29 DIAGNOSIS — E03.8 OTHER SPECIFIED HYPOTHYROIDISM: Chronic | ICD-10-CM

## 2024-10-29 DIAGNOSIS — I10 ESSENTIAL HYPERTENSION: Primary | Chronic | ICD-10-CM

## 2024-10-29 DIAGNOSIS — G47.33 OSA (OBSTRUCTIVE SLEEP APNEA): ICD-10-CM

## 2024-10-29 DIAGNOSIS — F41.9 ANXIETY: ICD-10-CM

## 2024-10-29 PROBLEM — E78.00 HIGH CHOLESTEROL: Status: ACTIVE | Noted: 2019-06-24

## 2024-10-29 PROBLEM — E78.00 HIGH CHOLESTEROL: Chronic | Status: ACTIVE | Noted: 2019-06-24

## 2024-10-29 PROCEDURE — 93000 ELECTROCARDIOGRAM COMPLETE: CPT | Performed by: INTERNAL MEDICINE

## 2024-10-29 PROCEDURE — 99214 OFFICE O/P EST MOD 30 MIN: CPT | Performed by: INTERNAL MEDICINE

## 2024-10-29 RX ORDER — BUSPIRONE HYDROCHLORIDE 10 MG/1
10 TABLET ORAL 3 TIMES DAILY
Qty: 270 TABLET | Refills: 1 | Status: SHIPPED | OUTPATIENT
Start: 2024-10-29

## 2024-10-29 RX ORDER — LEVOTHYROXINE SODIUM 125 UG/1
125 TABLET ORAL DAILY
Qty: 90 TABLET | Refills: 1 | Status: SHIPPED | OUTPATIENT
Start: 2024-10-29

## 2024-10-29 NOTE — PROGRESS NOTES
Subjective        Chief Complaint   Patient presents with    Diabetes           Beni Kunz is a 59 y.o. male who presents for    Patient Active Problem List   Diagnosis    High cholesterol    Essential hypertension    Other specified hypothyroidism    Type 2 diabetes mellitus without complication, without long-term current use of insulin    Alkaline phosphatase elevation    Class 1 obesity    Mild renal insufficiency    ROYCE (obstructive sleep apnea)       History of Present Illness       His sugars have been higher (up to 190) in the am after eating shredded wheat. He has not been exercising or checking his BP. He is doing well emotionally. Denies chest pain. He is working 12 hours per day. He has not missed any doses of thyroid replacement. Denies wt gain or cold intolerance. He usually has a BM daily.  No Known Allergies    Current Outpatient Medications on File Prior to Visit   Medication Sig Dispense Refill    atenolol (TENORMIN) 100 MG tablet Take 1 tablet by mouth Daily. 90 tablet 2    atorvastatin (LIPITOR) 20 MG tablet Take 1 tablet by mouth Daily. 90 tablet 2    busPIRone (BUSPAR) 10 MG tablet Take 1 tablet by mouth 3 (Three) Times a Day. 90 tablet 1    cetirizine (zyrTEC) 10 MG tablet Take 1 tablet by mouth Daily.      Continuous Blood Gluc Sensor (FreeStyle Soham 3 Sensor) misc Use 1 each Every 14 (Fourteen) Days. 2 each 2    fluticasone (FLONASE) 50 MCG/ACT nasal spray SPRAY 1-2 SPRAYS INTO EACH NOSTRIL EVERY DAY  11    hydroCHLOROthiazide 12.5 MG tablet Take 1 tablet by mouth Daily. 90 tablet 2    losartan (Cozaar) 50 MG tablet Take 1 tablet by mouth Daily. 90 tablet 1    omeprazole (priLOSEC) 40 MG capsule TAKE 1 CAPSULE BY MOUTH TWICE A DAY 60 capsule 11    [DISCONTINUED] levothyroxine (SYNTHROID, LEVOTHROID) 112 MCG tablet TAKE 1 TABLET BY MOUTH EVERY DAY 90 tablet 2    [DISCONTINUED] levothyroxine (SYNTHROID, LEVOTHROID) 112 MCG tablet Take 1 tablet by mouth Daily. 30 tablet 11     No current  facility-administered medications on file prior to visit.       Past Medical History:   Diagnosis Date    Anxiety     COVID-19 12/2021    Esophagitis     Fatty liver     GERD (gastroesophageal reflux disease)     Hepatitis C antibody positive in blood 09/26/2019    IgA deficiency     Plantar fasciitis, bilateral     Urticaria        Past Surgical History:   Procedure Laterality Date    COLONOSCOPY  05/08/2019    repeat 3 years Dr. Mahendra Michelle    COLONOSCOPY  09/02/2022    repeat in 5 years with Dr. Mahendra Michelle    ESOPHAGEAL DILATATION  2018       Family History   Problem Relation Age of Onset    Cancer Mother         Sinus cancer    Heart disease Mother     Arthritis Father     Heart disease Father     Colon polyps Father     Stroke Father     Hypertension Father     Heart failure Father     Colon cancer Brother     Diabetes Maternal Aunt        Social History     Socioeconomic History    Marital status: Single   Tobacco Use    Smoking status: Never     Passive exposure: Never    Smokeless tobacco: Never   Vaping Use    Vaping status: Never Used   Substance and Sexual Activity    Alcohol use: Yes     Comment: Very rarely drink    Drug use: No    Sexual activity: Yes     Partners: Female     Birth control/protection: Condom           The following portions of the patient's history were reviewed and updated as appropriate: problem list, allergies, current medications, past medical history, past family history, past social history, and past surgical history.    Review of Systems    Immunization History   Administered Date(s) Administered    COVID-19 (PFIZER) Purple Cap Monovalent 04/01/2021, 04/22/2021, 11/07/2021    Fluzone (or Fluarix & Flulaval for VFC) >6mos 09/15/2020, 11/04/2021, 09/19/2022, 09/18/2023    Fluzone Quad >6mos (Multi-dose) 12/05/2017, 11/01/2018    Hepatitis A 01/23/2018, 08/15/2018    Hepatitis B Adult/Adolescent IM 01/23/2018, 08/15/2018, 02/15/2019    Pneumococcal Polysaccharide (PPSV23) 02/15/2019     "Tdap 01/01/2010, 06/16/2020    flucelvax quad pfs =>4 YRS 09/26/2019       Objective   Vitals:    10/29/24 0852   BP: 120/82   Pulse: 60   Weight: 105 kg (232 lb)   Height: 175.3 cm (69.02\")     Body mass index is 34.24 kg/m².  Physical Exam  Vitals reviewed.   Constitutional:       Appearance: He is well-developed.   HENT:      Head: Normocephalic and atraumatic.   Cardiovascular:      Rate and Rhythm: Normal rate and regular rhythm.      Heart sounds: S1 normal and S2 normal.      Comments: Heart sounds distant  today. EKG is nl  Pulmonary:      Effort: Pulmonary effort is normal.      Breath sounds: Normal breath sounds.   Skin:     General: Skin is warm.   Neurological:      Mental Status: He is alert.   Psychiatric:         Behavior: Behavior normal.           ECG 12 Lead    Date/Time: 10/29/2024 9:38 AM  Performed by: Jet Moreno MD    Authorized by: Jet Moreno MD  Comparison: compared with previous ECG from 7/5/2022  Rhythm: sinus rhythm  Rate: normal  QRS axis: normal    Clinical impression: normal ECG          Assessment & Plan   Diagnoses and all orders for this visit:    1. Essential hypertension (Primary)  Comments:  BP is good  Orders:  -     ECG 12 Lead    2. Type 2 diabetes mellitus without complication, without long-term current use of insulin  Comments:  a1c is better    3. Other specified hypothyroidism  Comments:  He has not changed how he takes levothyroxine. I will increase to 125 mcgm.  Orders:  -     TSH; Future  -     levothyroxine (Synthroid) 125 MCG tablet; Take 1 tablet by mouth Daily.  Dispense: 90 tablet; Refill: 1    4. ROYCE (obstructive sleep apnea)  Comments:  enocouraged him to f/u for MAD    5. Mild renal insufficiency  Comments:  stable        Reviewed cmp, A1c and TSH. BP and sugars good. Increase levoxyl. Heart sounds distant today with nl EKG and no symptoms; do not think any other work up needed.           Return in about 6 weeks (around 12/10/2024) for Lab " Before FUP.

## 2024-12-20 ENCOUNTER — OFFICE VISIT (OUTPATIENT)
Dept: INTERNAL MEDICINE | Facility: CLINIC | Age: 59
End: 2024-12-20
Payer: COMMERCIAL

## 2024-12-20 VITALS
WEIGHT: 238.2 LBS | BODY MASS INDEX: 35.28 KG/M2 | SYSTOLIC BLOOD PRESSURE: 114 MMHG | HEIGHT: 69 IN | DIASTOLIC BLOOD PRESSURE: 80 MMHG

## 2024-12-20 DIAGNOSIS — E11.9 TYPE 2 DIABETES MELLITUS WITHOUT COMPLICATION, WITHOUT LONG-TERM CURRENT USE OF INSULIN: Chronic | ICD-10-CM

## 2024-12-20 DIAGNOSIS — E78.00 HIGH CHOLESTEROL: Chronic | ICD-10-CM

## 2024-12-20 DIAGNOSIS — Z23 NEED FOR INFLUENZA VACCINATION: Primary | ICD-10-CM

## 2024-12-20 DIAGNOSIS — E03.8 OTHER SPECIFIED HYPOTHYROIDISM: Chronic | ICD-10-CM

## 2024-12-20 DIAGNOSIS — I10 ESSENTIAL HYPERTENSION: Chronic | ICD-10-CM

## 2024-12-20 NOTE — PROGRESS NOTES
Subjective        Chief Complaint   Patient presents with    Hypertension           Beni Kunz is a 59 y.o. male who presents for    Patient Active Problem List   Diagnosis    High cholesterol    Essential hypertension    Other specified hypothyroidism    Type 2 diabetes mellitus without complication, without long-term current use of insulin    Alkaline phosphatase elevation    Class 1 obesity    Mild renal insufficiency    ROYCE (obstructive sleep apnea)       History of Present Illness     His BP has been 120/80. He was not tolerant of CPAP last year.    No Known Allergies    Current Outpatient Medications on File Prior to Visit   Medication Sig Dispense Refill    atenolol (TENORMIN) 100 MG tablet Take 1 tablet by mouth Daily. 90 tablet 2    atorvastatin (LIPITOR) 20 MG tablet Take 1 tablet by mouth Daily. 90 tablet 2    busPIRone (BUSPAR) 10 MG tablet Take 1 tablet by mouth 3 (Three) Times a Day. 270 tablet 1    cetirizine (zyrTEC) 10 MG tablet Take 1 tablet by mouth Daily.      Continuous Blood Gluc Sensor (FreeStyle Soham 3 Sensor) misc Use 1 each Every 14 (Fourteen) Days. 2 each 2    fluticasone (FLONASE) 50 MCG/ACT nasal spray SPRAY 1-2 SPRAYS INTO EACH NOSTRIL EVERY DAY  11    hydroCHLOROthiazide 12.5 MG tablet Take 1 tablet by mouth Daily. 90 tablet 2    levothyroxine (Synthroid) 125 MCG tablet Take 1 tablet by mouth Daily. 90 tablet 1    losartan (Cozaar) 50 MG tablet Take 1 tablet by mouth Daily. 90 tablet 1    omeprazole (priLOSEC) 40 MG capsule TAKE 1 CAPSULE BY MOUTH TWICE A DAY 60 capsule 11     No current facility-administered medications on file prior to visit.       Past Medical History:   Diagnosis Date    Anxiety     COVID-19 12/2021    Esophagitis     Fatty liver     GERD (gastroesophageal reflux disease)     Hepatitis C antibody positive in blood 09/26/2019    IgA deficiency     Plantar fasciitis, bilateral     Urticaria        Past Surgical History:   Procedure Laterality Date    COLONOSCOPY   "05/08/2019    repeat 3 years Dr. Mahendra Michelle    COLONOSCOPY  09/02/2022    repeat in 5 years with Dr. Mahendra Michelle    ESOPHAGEAL DILATATION  2018       Family History   Problem Relation Age of Onset    Cancer Mother         Sinus cancer    Heart disease Mother     Arthritis Father     Heart disease Father     Colon polyps Father     Stroke Father     Hypertension Father     Heart failure Father     Colon cancer Brother     Diabetes Maternal Aunt        Social History     Socioeconomic History    Marital status: Single   Tobacco Use    Smoking status: Never     Passive exposure: Never    Smokeless tobacco: Never   Vaping Use    Vaping status: Never Used   Substance and Sexual Activity    Alcohol use: Yes     Comment: Very rarely drink    Drug use: No    Sexual activity: Yes     Partners: Female     Birth control/protection: Condom           The following portions of the patient's history were reviewed and updated as appropriate: problem list, allergies, current medications, past medical history, past family history, past social history, and past surgical history.    Review of Systems    Immunization History   Administered Date(s) Administered    COVID-19 (PFIZER) Purple Cap Monovalent 04/01/2021, 04/22/2021, 11/07/2021    Fluzone  >6mos 10/29/2024    Fluzone (or Fluarix & Flulaval for VFC) >6mos 09/15/2020, 11/04/2021, 09/19/2022, 09/18/2023    Fluzone Quad >6mos (Multi-dose) 12/05/2017, 11/01/2018    Hepatitis A 01/23/2018, 08/15/2018    Hepatitis B Adult/Adolescent IM 01/23/2018, 08/15/2018, 02/15/2019    Pneumococcal Polysaccharide (PPSV23) 02/15/2019    Tdap 01/01/2010, 06/16/2020    flucelvax quad pfs =>4 YRS 09/26/2019       Objective   Vitals:    12/20/24 0813   BP: 114/80   Weight: 108 kg (238 lb 3.2 oz)   Height: 175.3 cm (69.02\")     Body mass index is 35.16 kg/m².  Physical Exam  Vitals reviewed.   Constitutional:       Appearance: He is well-developed.   HENT:      Head: Normocephalic and atraumatic. "   Cardiovascular:      Rate and Rhythm: Normal rate and regular rhythm.      Heart sounds: Normal heart sounds, S1 normal and S2 normal.   Pulmonary:      Effort: Pulmonary effort is normal.      Breath sounds: Normal breath sounds.   Skin:     General: Skin is warm.   Neurological:      Mental Status: He is alert.   Psychiatric:         Behavior: Behavior normal.         Procedures    Assessment & Plan   Diagnoses and all orders for this visit:    1. Need for influenza vaccination (Primary)  -     Fluzone >6mos    2. Essential hypertension  -     Comprehensive Metabolic Panel; Future    3. Type 2 diabetes mellitus without complication, without long-term current use of insulin  -     Hemoglobin A1c; Future  -     Microalbumin / Creatinine Urine Ratio - Urine, Clean Catch; Future    4. Other specified hypothyroidism  -     TSH; Future    5. High cholesterol  -     Lipid Panel With / Chol / HDL Ratio; Future               TSH is at goal. [facility] f/u sleep medicine.    Return in about 4 months (around 4/26/2025) for Annual physical, Lab Before FUP.

## 2025-02-04 ENCOUNTER — PATIENT ROUNDING (BHMG ONLY) (OUTPATIENT)
Dept: URGENT CARE | Facility: CLINIC | Age: 60
End: 2025-02-04
Payer: COMMERCIAL

## 2025-02-04 NOTE — ED NOTES
Thank you for letting us care for you during your recent visit at [facility]. We would love to hear about your experience with us.     We’re always looking for ways to make our patients’ experiences even better. Do you have any recommendations on ways we may improve?    I appreciate you taking the time to respond. Please be on the lookout for a survey about your recent visit from [facility] via text or email. We would greatly appreciate if you could fill that out and turn it back in. We want your voice to be heard and we value your feedback.     Thank you for choosing [facility] for your healthcare needs.

## 2025-02-28 RX ORDER — OMEPRAZOLE 40 MG/1
CAPSULE, DELAYED RELEASE ORAL
Qty: 60 CAPSULE | Refills: 11 | Status: SHIPPED | OUTPATIENT
Start: 2025-02-28

## 2025-03-27 DIAGNOSIS — I10 ESSENTIAL HYPERTENSION: Chronic | ICD-10-CM

## 2025-03-27 RX ORDER — LOSARTAN POTASSIUM 50 MG/1
50 TABLET ORAL DAILY
Qty: 30 TABLET | Refills: 5 | Status: SHIPPED | OUTPATIENT
Start: 2025-03-27

## 2025-04-11 DIAGNOSIS — E03.8 OTHER SPECIFIED HYPOTHYROIDISM: Chronic | ICD-10-CM

## 2025-04-11 RX ORDER — LEVOTHYROXINE SODIUM 125 UG/1
125 TABLET ORAL DAILY
Qty: 30 TABLET | Refills: 5 | Status: SHIPPED | OUTPATIENT
Start: 2025-04-11

## 2025-05-05 ENCOUNTER — OFFICE VISIT (OUTPATIENT)
Dept: INTERNAL MEDICINE | Facility: CLINIC | Age: 60
End: 2025-05-05
Payer: COMMERCIAL

## 2025-05-05 VITALS
WEIGHT: 234 LBS | DIASTOLIC BLOOD PRESSURE: 86 MMHG | HEIGHT: 68 IN | BODY MASS INDEX: 35.46 KG/M2 | SYSTOLIC BLOOD PRESSURE: 126 MMHG

## 2025-05-05 DIAGNOSIS — E78.00 HIGH CHOLESTEROL: Chronic | ICD-10-CM

## 2025-05-05 DIAGNOSIS — E03.8 OTHER SPECIFIED HYPOTHYROIDISM: Chronic | ICD-10-CM

## 2025-05-05 DIAGNOSIS — Z00.00 WELLNESS EXAMINATION: Primary | ICD-10-CM

## 2025-05-05 DIAGNOSIS — G47.33 OSA (OBSTRUCTIVE SLEEP APNEA): ICD-10-CM

## 2025-05-05 DIAGNOSIS — I10 ESSENTIAL HYPERTENSION: Chronic | ICD-10-CM

## 2025-05-05 DIAGNOSIS — E11.9 TYPE 2 DIABETES MELLITUS WITHOUT COMPLICATION, WITHOUT LONG-TERM CURRENT USE OF INSULIN: Chronic | ICD-10-CM

## 2025-05-05 RX ORDER — ATORVASTATIN CALCIUM 40 MG/1
40 TABLET, FILM COATED ORAL DAILY
Qty: 90 TABLET | Refills: 1 | Status: SHIPPED | OUTPATIENT
Start: 2025-05-05

## 2025-05-05 NOTE — PROGRESS NOTES
Subjective        Chief Complaint   Patient presents with    Annual Exam           Beni Kunz is a 59 y.o. male who presents for    Patient Active Problem List   Diagnosis    High cholesterol    Essential hypertension    Other specified hypothyroidism    Type 2 diabetes mellitus without complication, without long-term current use of insulin    Alkaline phosphatase elevation    Class 1 obesity    Mild renal insufficiency    ROYCE (obstructive sleep apnea)       History of Present Illness   He has not been exercising much b/c of work. His BP was 138/90 at last check. His sugars run 130-160.   He eats wheat bread. He tries to avoid chips and potatoes. He does not use a CPAP; he was not able to tolerate. He met with Dr. Gonzalez and it was going to be 3500 dollars. He sees urology once per year for his PSA.    No Known Allergies    Current Outpatient Medications on File Prior to Visit   Medication Sig Dispense Refill    atenolol (TENORMIN) 100 MG tablet Take 1 tablet by mouth Daily. 90 tablet 2    busPIRone (BUSPAR) 10 MG tablet Take 1 tablet by mouth 3 (Three) Times a Day. 270 tablet 1    cetirizine (zyrTEC) 10 MG tablet Take 1 tablet by mouth Daily.      Continuous Blood Gluc Sensor (FreeStyle Soham 3 Sensor) [facility] Use 1 each Every 14 (Fourteen) Days. 2 each 2    fluticasone (FLONASE) 50 MCG/ACT nasal spray SPRAY 1-2 SPRAYS INTO EACH NOSTRIL EVERY DAY  11    hydroCHLOROthiazide 12.5 MG tablet Take 1 tablet by mouth Daily. 90 tablet 2    levothyroxine (SYNTHROID, LEVOTHROID) 125 MCG tablet TAKE 1 TABLET BY MOUTH EVERY DAY 30 tablet 5    losartan (COZAAR) 50 MG tablet TAKE 1 TABLET BY MOUTH EVERY DAY 30 tablet 5    omeprazole (priLOSEC) 40 MG capsule TAKE 1 CAPSULE BY MOUTH TWICE A DAY 60 capsule 11    [DISCONTINUED] atorvastatin (LIPITOR) 20 MG tablet Take 1 tablet by mouth Daily. 90 tablet 2     No current facility-administered medications on file prior to visit.       Past Medical History:   Diagnosis Date    Anxiety      COVID-19 12/2021    Esophagitis     Fatty liver     GERD (gastroesophageal reflux disease)     Hepatitis C antibody positive in blood 09/26/2019    IgA deficiency     Plantar fasciitis, bilateral     Urticaria        Past Surgical History:   Procedure Laterality Date    COLONOSCOPY  05/08/2019    repeat 3 years Dr. Mahendra Michelle    COLONOSCOPY  09/02/2022    repeat in 5 years with Dr. Mahendra Michelle    ESOPHAGEAL DILATATION  2018       Family History   Problem Relation Age of Onset    Cancer Mother         Sinus cancer    Heart disease Mother     Arthritis Father     Heart disease Father     Colon polyps Father     Stroke Father     Hypertension Father     Heart failure Father     Colon cancer Brother     Diabetes Maternal Aunt        Social History     Socioeconomic History    Marital status: Single   Tobacco Use    Smoking status: Never     Passive exposure: Never    Smokeless tobacco: Never   Vaping Use    Vaping status: Never Used   Substance and Sexual Activity    Alcohol use: Yes     Comment: Very rarely drink    Drug use: No    Sexual activity: Yes     Partners: Female     Birth control/protection: Condom           The following portions of the patient's history were reviewed and updated as appropriate: problem list, allergies, current medications, past medical history, past family history, past social history, and past surgical history.    Review of Systems    Immunization History   Administered Date(s) Administered    COVID-19 (PFIZER) Purple Cap Monovalent 04/01/2021, 04/22/2021, 11/07/2021    Fluzone  >6mos 10/29/2024    Fluzone (or Fluarix & Flulaval for VFC) >6mos 09/15/2020, 11/04/2021, 09/19/2022, 09/18/2023    Fluzone Quad >6mos (Multi-dose) 12/05/2017, 11/01/2018    Hepatitis A 01/23/2018, 08/15/2018    Hepatitis B Adult/Adolescent IM 01/23/2018, 08/15/2018, 02/15/2019    Pneumococcal Polysaccharide (PPSV23) 02/15/2019    Tdap 01/01/2010, 06/16/2020    flucelvax quad pfs =>4 YRS 09/26/2019       Objective  "  Vitals:    05/05/25 0946   BP: 126/86   Weight: 106 kg (234 lb)   Height: 172.7 cm (68.01\")     Body mass index is 35.57 kg/m².  Physical Exam  Vitals reviewed.   Constitutional:       Appearance: He is well-developed.   HENT:      Head: Normocephalic and atraumatic.      Mouth/Throat:      Mouth: Mucous membranes are moist.      Pharynx: Oropharynx is clear.   Eyes:      Extraocular Movements: Extraocular movements intact.      Conjunctiva/sclera: Conjunctivae normal.      Pupils: Pupils are equal, round, and reactive to light.   Neck:      Thyroid: No thyromegaly.      Vascular: No carotid bruit.   Cardiovascular:      Rate and Rhythm: Normal rate and regular rhythm.      Heart sounds: Normal heart sounds. No murmur heard.  Pulmonary:      Effort: Pulmonary effort is normal.      Breath sounds: Normal breath sounds.   Abdominal:      General: There is no distension.      Palpations: Abdomen is soft. There is no mass.      Tenderness: There is no abdominal tenderness. There is no rebound.   Musculoskeletal:      Cervical back: Neck supple.   Feet:      Right foot:      Protective Sensation: 5 sites tested.  5 sites sensed.      Skin integrity: Skin integrity normal.      Left foot:      Protective Sensation: 5 sites tested.  5 sites sensed.      Skin integrity: Skin integrity normal.   Lymphadenopathy:      Cervical: No cervical adenopathy.   Skin:     General: Skin is warm.   Neurological:      Mental Status: He is alert.   Psychiatric:         Behavior: Behavior normal.         Procedures    Assessment & Plan   Diagnoses and all orders for this visit:    1. Wellness examination (Primary)    2. High cholesterol  -     atorvastatin (Lipitor) 40 MG tablet; Take 1 tablet by mouth Daily.  Dispense: 90 tablet; Refill: 1  -     Lipid Panel With / Chol / HDL Ratio; Future    3. Essential hypertension  -     Comprehensive Metabolic Panel; Future    4. Type 2 diabetes mellitus without complication, without long-term " current use of insulin  -     Hemoglobin A1c; Future    5. Other specified hypothyroidism  -     TSH; Future    6. ROYCE (obstructive sleep apnea)  Comments:  He did look into a MAD.               Reviewed cmp, flp, tsh, A1c, and urine. Cscope is UTD. Recommend exercise 150-300 min per week. He has an eye appt for October. Increase lipitor to decrease LDL. His A1c is 7; discussed decreasing carbs and calories to keep below 7. Recommend [facility] at [facility].    Return in about 3 months (around 8/5/2025), or 30 minutes, for Lab Before FUP.

## 2025-06-22 DIAGNOSIS — I10 ESSENTIAL HYPERTENSION: Chronic | ICD-10-CM

## 2025-06-23 RX ORDER — ATENOLOL 100 MG/1
100 TABLET ORAL DAILY
Qty: 30 TABLET | Refills: 8 | Status: SHIPPED | OUTPATIENT
Start: 2025-06-23

## 2025-06-23 RX ORDER — HYDROCHLOROTHIAZIDE 12.5 MG/1
12.5 TABLET ORAL DAILY
Qty: 30 TABLET | Refills: 8 | Status: SHIPPED | OUTPATIENT
Start: 2025-06-23

## 2025-08-07 ENCOUNTER — OFFICE VISIT (OUTPATIENT)
Dept: INTERNAL MEDICINE | Facility: CLINIC | Age: 60
End: 2025-08-07
Payer: COMMERCIAL

## 2025-08-07 VITALS
SYSTOLIC BLOOD PRESSURE: 110 MMHG | DIASTOLIC BLOOD PRESSURE: 72 MMHG | HEIGHT: 68 IN | BODY MASS INDEX: 34.4 KG/M2 | WEIGHT: 227 LBS

## 2025-08-07 DIAGNOSIS — E11.9 TYPE 2 DIABETES MELLITUS WITHOUT COMPLICATION, WITHOUT LONG-TERM CURRENT USE OF INSULIN: Primary | Chronic | ICD-10-CM

## 2025-08-07 DIAGNOSIS — E78.00 HIGH CHOLESTEROL: Chronic | ICD-10-CM

## 2025-08-07 DIAGNOSIS — E03.8 OTHER SPECIFIED HYPOTHYROIDISM: Chronic | ICD-10-CM

## 2025-08-07 DIAGNOSIS — N28.9 MILD RENAL INSUFFICIENCY: ICD-10-CM

## 2025-08-07 DIAGNOSIS — K76.0 FATTY LIVER: ICD-10-CM

## 2025-08-07 DIAGNOSIS — I10 ESSENTIAL HYPERTENSION: Chronic | ICD-10-CM

## 2025-08-07 RX ORDER — ATORVASTATIN CALCIUM 80 MG/1
80 TABLET, FILM COATED ORAL DAILY
Qty: 90 TABLET | Refills: 1 | Status: SHIPPED | OUTPATIENT
Start: 2025-08-07

## 2025-08-08 LAB
APPEARANCE UR: CLEAR
BACTERIA #/AREA URNS HPF: NORMAL /[HPF]
BILIRUB UR QL STRIP: NEGATIVE
CASTS URNS QL MICRO: NORMAL /LPF
COLOR UR: YELLOW
CREAT UR-MCNC: 73.5 MG/DL
EPI CELLS #/AREA URNS HPF: NORMAL /HPF (ref 0–10)
GLUCOSE UR QL STRIP: NEGATIVE
HGB UR QL STRIP: NEGATIVE
KETONES UR QL STRIP: NEGATIVE
LEUKOCYTE ESTERASE UR QL STRIP: NEGATIVE
MICRO URNS: NORMAL
MICRO URNS: NORMAL
NITRITE UR QL STRIP: NEGATIVE
PH UR STRIP: 6 [PH] (ref 5–7.5)
PROT UR QL STRIP: NEGATIVE
PROT UR-MCNC: 9.1 MG/DL
PROT/CREAT UR: 123.8 MG/G CREA (ref 0–200)
RBC #/AREA URNS HPF: NORMAL /HPF (ref 0–2)
SP GR UR STRIP: 1.01 (ref 1–1.03)
URINALYSIS REFLEX: NORMAL
UROBILINOGEN UR STRIP-MCNC: 0.2 MG/DL (ref 0.2–1)
WBC #/AREA URNS HPF: NORMAL /HPF (ref 0–5)

## 2025-08-21 ENCOUNTER — TELEPHONE (OUTPATIENT)
Dept: INTERNAL MEDICINE | Facility: CLINIC | Age: 60
End: 2025-08-21
Payer: COMMERCIAL

## 2025-08-26 ENCOUNTER — OFFICE VISIT (OUTPATIENT)
Dept: INTERNAL MEDICINE | Facility: CLINIC | Age: 60
End: 2025-08-26
Payer: COMMERCIAL

## 2025-08-26 VITALS
BODY MASS INDEX: 34.01 KG/M2 | HEIGHT: 68 IN | SYSTOLIC BLOOD PRESSURE: 140 MMHG | HEART RATE: 66 BPM | WEIGHT: 224.4 LBS | DIASTOLIC BLOOD PRESSURE: 90 MMHG

## 2025-08-26 DIAGNOSIS — E11.9 TYPE 2 DIABETES MELLITUS WITHOUT COMPLICATION, WITHOUT LONG-TERM CURRENT USE OF INSULIN: ICD-10-CM

## 2025-08-26 DIAGNOSIS — I10 ESSENTIAL HYPERTENSION: Chronic | ICD-10-CM

## 2025-08-26 DIAGNOSIS — E78.00 HIGH CHOLESTEROL: Chronic | ICD-10-CM

## 2025-08-26 DIAGNOSIS — I25.10 ASCVD (ARTERIOSCLEROTIC CARDIOVASCULAR DISEASE): Primary | ICD-10-CM

## 2025-08-26 DIAGNOSIS — N28.9 MILD RENAL INSUFFICIENCY: ICD-10-CM

## 2025-08-26 PROBLEM — I21.11 ACUTE ST ELEVATION MYOCARDIAL INFARCTION (STEMI) INVOLVING RIGHT CORONARY ARTERY: Status: ACTIVE | Noted: 2025-08-19

## 2025-08-26 PROBLEM — I21.11 ACUTE ST ELEVATION MYOCARDIAL INFARCTION (STEMI) INVOLVING RIGHT CORONARY ARTERY: Status: RESOLVED | Noted: 2025-08-19 | Resolved: 2025-08-26

## 2025-08-26 RX ORDER — DAPAGLIFLOZIN 10 MG/1
10 TABLET, FILM COATED ORAL DAILY
COMMUNITY
Start: 2025-08-19 | End: 2025-08-26 | Stop reason: SDUPTHER

## 2025-08-26 RX ORDER — NITROGLYCERIN 0.4 MG/1
0.4 TABLET SUBLINGUAL
COMMUNITY
Start: 2025-08-20

## 2025-08-26 RX ORDER — DAPAGLIFLOZIN 10 MG/1
10 TABLET, FILM COATED ORAL DAILY
Qty: 90 TABLET | Refills: 1 | Status: SHIPPED | OUTPATIENT
Start: 2025-08-26

## 2025-08-26 RX ORDER — TICAGRELOR 90 MG/1
90 TABLET, FILM COATED ORAL 2 TIMES DAILY
Qty: 180 TABLET | Refills: 1 | Status: SHIPPED | OUTPATIENT
Start: 2025-08-26

## 2025-08-26 RX ORDER — OLMESARTAN MEDOXOMIL 20 MG/1
20 TABLET ORAL DAILY
Qty: 30 TABLET | Refills: 1 | Status: SHIPPED | OUTPATIENT
Start: 2025-08-26

## 2025-08-26 RX ORDER — ROSUVASTATIN CALCIUM 40 MG/1
40 TABLET, COATED ORAL DAILY
Qty: 90 TABLET | Refills: 1 | Status: SHIPPED | OUTPATIENT
Start: 2025-08-26

## 2025-08-26 RX ORDER — ASPIRIN 81 MG/1
81 TABLET ORAL DAILY
COMMUNITY
Start: 2025-08-21

## 2025-08-26 RX ORDER — METOPROLOL SUCCINATE 50 MG/1
50 TABLET, EXTENDED RELEASE ORAL DAILY
Qty: 30 TABLET | Refills: 2 | Status: SHIPPED | OUTPATIENT
Start: 2025-08-26

## 2025-08-26 RX ORDER — ROSUVASTATIN CALCIUM 40 MG/1
40 TABLET, COATED ORAL DAILY
COMMUNITY
Start: 2025-08-19 | End: 2025-08-26 | Stop reason: SDUPTHER

## 2025-08-26 RX ORDER — TICAGRELOR 90 MG/1
90 TABLET, FILM COATED ORAL 2 TIMES DAILY
COMMUNITY
Start: 2025-08-19 | End: 2025-08-26 | Stop reason: SDUPTHER